# Patient Record
Sex: FEMALE | Race: WHITE | Employment: UNEMPLOYED | URBAN - METROPOLITAN AREA
[De-identification: names, ages, dates, MRNs, and addresses within clinical notes are randomized per-mention and may not be internally consistent; named-entity substitution may affect disease eponyms.]

---

## 2017-01-24 RX ORDER — ZOLPIDEM TARTRATE 5 MG/1
TABLET ORAL
Qty: 30 TABLET | Refills: 0 | OUTPATIENT
Start: 2017-01-24 | End: 2017-01-24

## 2017-01-24 RX ORDER — ZOLPIDEM TARTRATE 5 MG/1
TABLET ORAL
Qty: 30 TABLET | Refills: 0 | OUTPATIENT
Start: 2017-01-24 | End: 2017-02-28

## 2017-03-01 RX ORDER — FOSINOPRIL SODIUM AND HYDROCHLOROTHIAZIDE 20; 12.5 MG/1; MG/1
TABLET ORAL
Qty: 90 TABLET | Refills: 1 | Status: SHIPPED | OUTPATIENT
Start: 2017-03-01 | End: 2017-10-11

## 2017-03-01 RX ORDER — ZOLPIDEM TARTRATE 5 MG/1
TABLET ORAL
Qty: 30 TABLET | Refills: 0 | OUTPATIENT
Start: 2017-03-01 | End: 2017-04-10

## 2017-04-10 RX ORDER — ZOLPIDEM TARTRATE 5 MG/1
TABLET ORAL
Qty: 30 TABLET | Refills: 0 | OUTPATIENT
Start: 2017-04-10 | End: 2017-05-12

## 2017-04-12 RX ORDER — ZOLPIDEM TARTRATE 5 MG/1
TABLET ORAL
Qty: 30 TABLET | Refills: 0 | OUTPATIENT
Start: 2017-04-12

## 2017-04-12 NOTE — TELEPHONE ENCOUNTER
Pharmacy      [id number], [facility] [phone], [phone]       Medication Detail         Disp Refills Start End        ZOLPIDEM TARTRATE 5 MG Oral Tab 30 tablet 0 4/10/2017       Sig: TAKE 1 TABLET BY MOUTH AT BEDTIME

## 2017-05-10 NOTE — TELEPHONE ENCOUNTER
Malou/THELMA called for a new refill request  Patient is currently out of medication  Current Outpatient Prescriptions:  ZOLPIDEM TARTRATE 5 MG Oral Tab TAKE 1 TABLET BY MOUTH AT BEDTIME Disp: 30 tablet Rfl: 0     [facility] Carissa-balaji

## 2017-05-13 RX ORDER — ZOLPIDEM TARTRATE 5 MG/1
TABLET ORAL
Qty: 30 TABLET | Refills: 0 | OUTPATIENT
Start: 2017-05-13 | End: 2017-06-07

## 2017-06-01 ENCOUNTER — TELEPHONE (OUTPATIENT)
Dept: INTERNAL MEDICINE CLINIC | Facility: CLINIC | Age: 62
End: 2017-06-01

## 2017-06-01 NOTE — TELEPHONE ENCOUNTER
Spouse stts the pt need to be seen only with PCP   Per PCP she can book her in for an appt   Spouse is asking for Monday   Pt is having stomach pain   Please advise

## 2017-06-02 NOTE — TELEPHONE ENCOUNTER
Called and s/w , he stts pt is at work,   Advised pt's  that we need to speak with pt to triage,  stated pt needs an appt, he stts he mentioned to MMP last week that pt needs to be seen and he states MMP advised him to call the office.

## 2017-06-02 NOTE — TELEPHONE ENCOUNTER
Actions Requested: Dr. Aga Parry, please advise if patient can be accommodated 8:20am on [facility] 6/7/17 [facility] office.    Situation/Background   Problem: stomach discomfort   Onset: 1-2 weeks   Associated Symptoms: slight burniing, gassy, bloated, no nausea, bandar

## 2017-06-03 NOTE — TELEPHONE ENCOUNTER
LMTCB- transfer to triage. Left detailed message on voicemail with Dr Kimi Romo note and to call office back to confirm appointment.

## 2017-06-03 NOTE — TELEPHONE ENCOUNTER
Pt called back. Pt agreed to Wed at 8:20. She knows to be here at [address], please book this appt.

## 2017-06-07 NOTE — PROGRESS NOTES
HPI:    Patient ID: Martin Sapp is a 62year old female.     HPI  Epigastric abodminl pain for about 2 wks  Had it in the past is worseing  Off antaci  Mild nausea no vomiting  Wt Readings from Last 6 Encounters:  06/07/17 : 143 lb 3.2 oz (64.955 kg) advised to stop her simvastatin (she has been taking this for 2-1/2 years) and Aleve which she has been taking for spinal stenosis.  A right upper quadrant ultrasound revealed increased hepatic echogenicity and a prominent echogenic structure in the neck of Gastrointestinal: Negative for nausea, vomiting, abdominal pain, diarrhea, constipation and blood in stool. Genitourinary: Negative for dysuria, urgency, frequency, hematuria and difficulty urinating.    Musculoskeletal: Negative for back pain, joint sw Mother    • Saranya Jj Maternal Grandmother    • Other[other] [OTHER] Maternal Grandfather    • Saranya Jj Paternal Grandmother    • Other[other] [OTHER] Paternal Grandfather       Social History:   Smoking Status: Former Smoker mg/dL 0.80    BUN/CREA RATIO      10.0 - 20.0 22.5 (H)    ANION GAP      0 - 18 8    CALCIUM      8.5 - 10.5 mg/dL 9.6    CALCULATED OSMOLALITY      275 - 295 mOsm/kg 302 (H)    AST (SGOT)      15 - 41 U/L 19    ALT (SGPT)      14 - 54 U/L 20    ALKALINE P WBC      0 - 5 /HPF 4    URINE RBC      0 - 3 /HPF 2    URINE BACTERIA      None seen Negative    MICROSCOPIC URINALYSIS COMMENT       Completed    HDL Cholesterol       63    CHOLESTEROL, TOTAL      110 - 200 mg/dL 239 (H)    Triglycerides      1 - 149 mg sofya sheikh exam advised    (Z78.0) Postmenopausal  Plan: XR DEXA BONE DENSITOMETRY (CPT=77080)          . Medication reviewed. Refill will be given as needed . Mediation side effect reviewed. Most recent laboratory and diagnostic testing reviewed.   Diet

## 2017-06-15 ENCOUNTER — TELEPHONE (OUTPATIENT)
Dept: INTERNAL MEDICINE CLINIC | Facility: CLINIC | Age: 62
End: 2017-06-15

## 2017-06-15 NOTE — TELEPHONE ENCOUNTER
Pt stts per her last visit she only got 2 meds that was sent to her Pharm   Pt stts according to her office notes 3 RX meds were to be prescribed   Pt did not get the RX escitalopram (LEXAPRO) 5 MG Oral Tab    Please advise

## 2017-06-16 RX ORDER — ESCITALOPRAM OXALATE 5 MG/1
5 TABLET ORAL DAILY
Qty: 30 TABLET | Refills: 0 | Status: SHIPPED | OUTPATIENT
Start: 2017-06-16 | End: 2017-07-24

## 2017-07-08 RX ORDER — ZOLPIDEM TARTRATE 5 MG/1
TABLET ORAL
Qty: 30 TABLET | Refills: 0 | OUTPATIENT
Start: 2017-07-08 | End: 2017-07-11

## 2017-07-08 RX ORDER — ALPRAZOLAM 0.25 MG/1
TABLET ORAL
Qty: 60 TABLET | Refills: 0 | OUTPATIENT
Start: 2017-07-08 | End: 2017-07-11

## 2017-07-11 ENCOUNTER — TELEPHONE (OUTPATIENT)
Dept: INTERNAL MEDICINE CLINIC | Facility: CLINIC | Age: 62
End: 2017-07-11

## 2017-07-11 RX ORDER — ALPRAZOLAM 0.25 MG/1
TABLET ORAL
Qty: 60 TABLET | Refills: 0 | OUTPATIENT
Start: 2017-07-11 | End: 2017-09-13

## 2017-07-12 RX ORDER — ZOLPIDEM TARTRATE 5 MG/1
TABLET ORAL
Qty: 30 TABLET | Refills: 0 | OUTPATIENT
Start: 2017-07-12 | End: 2017-08-18

## 2017-07-13 NOTE — TELEPHONE ENCOUNTER
THELMA - Arnold Pillion calling checking status of refill for two meds, pt is out of meds.        Current Outpatient Prescriptions:  ZOLPIDEM TARTRATE 5 MG Oral Tab TAKE 1 TABLET BY MOUTH AT BEDTIME Disp: 30 tablet Rfl: 0   ALPRAZOLAM 0.25 MG Oral Tab TAKE 1 TABLET BY M

## 2017-07-25 RX ORDER — ESCITALOPRAM OXALATE 5 MG/1
5 TABLET ORAL DAILY
Qty: 30 TABLET | Refills: 5 | Status: SHIPPED | OUTPATIENT
Start: 2017-07-25 | End: 2017-10-11

## 2017-07-28 ENCOUNTER — TELEPHONE (OUTPATIENT)
Dept: INTERNAL MEDICINE CLINIC | Facility: CLINIC | Age: 62
End: 2017-07-28

## 2017-07-28 NOTE — TELEPHONE ENCOUNTER
Pharmacy reports that they have faxed request to our office for refill, but no response received. Current Outpatient Prescriptions:  Omeprazole 40 MG Oral Capsule Delayed Release Take 1 capsule (40 mg total) by mouth daily.  Disp: 30 capsule Rfl: 5     P

## 2017-08-03 RX ORDER — OMEPRAZOLE 40 MG/1
40 CAPSULE, DELAYED RELEASE ORAL DAILY
Qty: 90 CAPSULE | Refills: 1 | Status: SHIPPED | OUTPATIENT
Start: 2017-08-03 | End: 2019-02-25 | Stop reason: ALTCHOICE

## 2017-08-17 NOTE — TELEPHONE ENCOUNTER
Per César Radford  of pt,  Pt needs a refill on ZOLPIDEM TARTRATE . Pt is out of med.       Current Outpatient Prescriptions:              ZOLPIDEM TARTRATE 5 MG Oral Tab TAKE 1 TABLET BY MOUTH AT BEDTIME Disp: 30 tablet Rfl: 0

## 2017-08-18 NOTE — TELEPHONE ENCOUNTER
Requesting Zolpidem refill    Last filled 7/12/17  Last OV 6/7/17    Med pended for review, please advise

## 2017-08-21 RX ORDER — ZOLPIDEM TARTRATE 5 MG/1
TABLET ORAL
Qty: 30 TABLET | Refills: 0 | OUTPATIENT
Start: 2017-08-21 | End: 2017-08-22

## 2017-08-22 RX ORDER — ZOLPIDEM TARTRATE 5 MG/1
TABLET ORAL
Qty: 30 TABLET | Refills: 0 | OUTPATIENT
Start: 2017-08-22 | End: 2017-10-03

## 2017-08-24 ENCOUNTER — NURSE TRIAGE (OUTPATIENT)
Dept: OTHER | Age: 62
End: 2017-08-24

## 2017-08-24 NOTE — TELEPHONE ENCOUNTER
Called  to confirm pharmacy is now [facility].  started asking me about getting an earlier appt w/ MA for himself, I advised he call back to speak with scheduling as I don't regularly schedule for her. Rx called into [facility] .

## 2017-08-24 NOTE — TELEPHONE ENCOUNTER
Action Requested: Summary for Provider     []  Critical Lab, Recommendations Needed  [] Need Additional Advice  []   FYI    []   Need Orders  [] Need Medications Sent to Pharmacy  []  Other     SUMMARY:    All information in this encounter is from her husb

## 2017-08-28 NOTE — TELEPHONE ENCOUNTER
I called pt to see how she was doing [place] pt  is very upset. He stated that he needs you to see his wife this week and he will also like to see you sooner then 9/1.  He stated that he has been seeing you for 30 years and he even moved lon

## 2017-08-30 NOTE — TELEPHONE ENCOUNTER
Spoke with patient's  and scheduled appt for patient for Friday 9/1/17 at 1:20pm with Dr. Jovita Patiño at [facility]. Appt also scheduled for patient's  on same day at 1:40pm immediately after patient's appt.       MD Ross Gray

## 2017-09-13 ENCOUNTER — HOSPITAL ENCOUNTER (OUTPATIENT)
Dept: MAMMOGRAPHY | Facility: HOSPITAL | Age: 62
Discharge: HOME | End: 2017-09-13
Attending: INTERNAL MEDICINE
Payer: COMMERCIAL

## 2017-09-13 ENCOUNTER — HOSPITAL ENCOUNTER (OUTPATIENT)
Dept: BONE DENSITY | Facility: HOSPITAL | Age: 62
Discharge: HOME | End: 2017-09-13
Attending: INTERNAL MEDICINE
Payer: COMMERCIAL

## 2017-09-13 DIAGNOSIS — Z78.0 POSTMENOPAUSAL: ICD-10-CM

## 2017-09-13 DIAGNOSIS — R92.2 DENSE BREAST: ICD-10-CM

## 2017-09-13 PROCEDURE — 77080 DXA BONE DENSITY AXIAL: CPT | Performed by: INTERNAL MEDICINE

## 2017-09-13 PROCEDURE — 77066 DX MAMMO INCL CAD BI: CPT | Performed by: INTERNAL MEDICINE

## 2017-09-13 PROCEDURE — 77062 BREAST TOMOSYNTHESIS BI: CPT | Performed by: INTERNAL MEDICINE

## 2017-09-13 NOTE — TELEPHONE ENCOUNTER
Spouse states that pt would like a refill on alprazolam medication. Per spouse pt is out of medication. [facility] per spouse pt is out of medication.          Current Outpatient Prescriptions:  ALPRAZOLAM 0.25 MG Oral Tab TAKE 1 TABLET

## 2017-09-14 ENCOUNTER — HOSPITAL ENCOUNTER (OUTPATIENT)
Dept: ULTRASOUND IMAGING | Age: 62
Discharge: HOME | End: 2017-09-14
Attending: INTERNAL MEDICINE
Payer: COMMERCIAL

## 2017-09-14 DIAGNOSIS — K76.0 FATTY LIVER: ICD-10-CM

## 2017-09-14 DIAGNOSIS — R10.13 EPIGASTRIC PAIN: ICD-10-CM

## 2017-09-14 PROCEDURE — 76705 ECHO EXAM OF ABDOMEN: CPT | Performed by: INTERNAL MEDICINE

## 2017-09-14 RX ORDER — ALPRAZOLAM 0.25 MG/1
TABLET ORAL
Qty: 60 TABLET | Refills: 0 | OUTPATIENT
Start: 2017-09-14 | End: 2017-10-11

## 2017-09-15 ENCOUNTER — TELEPHONE (OUTPATIENT)
Dept: OTHER | Age: 62
End: 2017-09-15

## 2017-09-15 NOTE — TELEPHONE ENCOUNTER
Notes Recorded by Saravanan Maldonado MD on 9/14/2017 at 2:12 AM CDT  Impression   CONCLUSION:   * Severe osteopenia left femoral neck. Normal bone density total left hip and lumbar spine. * No significant change since previous study.   * 9.5% 10 year risk fo

## 2017-09-15 NOTE — TELEPHONE ENCOUNTER
[facility]    Please transfer [id number] anytime. Thank you. Result Notes     Notes Recorded by Checo Cervantes MD on 9/14/2017 at 2:12 AM CDT  Impression   CONCLUSION:   * Severe osteopenia left femoral neck.  Normal bone density total left hip and lumbar spine

## 2017-10-09 NOTE — TELEPHONE ENCOUNTER
Per pt, she needs refill on her FOSINOPRIL SODIUM-HCTZ and ESCITALOPRAM and pt is out of med. Current Outpatient Prescriptions:                    ESCITALOPRAM 5 MG Oral Tab TAKE 1 TABLET (5 MG TOTAL) BY MOUTH DAILY.  Disp: 30 tablet Rfl: 5   FOSINOPRI

## 2017-10-09 NOTE — TELEPHONE ENCOUNTER
Spoke with patient and relayed MMP message below--patient request advice re: osteopenia--she states she is not taking any calcium supplements. Do you recommend any OTC or prescription medications for osteopenia? Please advise.

## 2017-10-10 NOTE — TELEPHONE ENCOUNTER
Pt's spouse calling to check status of refill and needing to add 1 more med below and states out of medication.     Current Outpatient Prescriptions:   •  ALPRAZolam 0.25 MG Oral Tab, TAKE 1 TABLET BY MOUTH TWICE A DAY AS NEEDED FOR SLEEP, Disp: 60 tablet,

## 2017-10-11 NOTE — TELEPHONE ENCOUNTER
Refill Protocol Appointment Criteria  · Appointment scheduled in the past 6 months or in the next 3 months  Recent Outpatient Visits            4 months ago Ananda Croft MD    Office Visit    1 makenzie

## 2017-10-11 NOTE — TELEPHONE ENCOUNTER
She blood work order generated June 2017  She should have labs done Vit D included  Then I can tell her how much vit Dsupplement she will need

## 2017-10-12 RX ORDER — ESCITALOPRAM OXALATE 5 MG/1
5 TABLET ORAL DAILY
Qty: 30 TABLET | Refills: 5 | Status: SHIPPED | OUTPATIENT
Start: 2017-10-12 | End: 2018-02-12

## 2017-10-12 RX ORDER — ALPRAZOLAM 0.25 MG/1
TABLET ORAL
Qty: 60 TABLET | Refills: 0 | OUTPATIENT
Start: 2017-10-12 | End: 2017-11-07

## 2017-10-12 RX ORDER — FOSINOPRIL SODIUM AND HYDROCHLOROTHIAZIDE 20; 12.5 MG/1; MG/1
1 TABLET ORAL
Qty: 90 TABLET | Refills: 1 | Status: SHIPPED | OUTPATIENT
Start: 2017-10-12 | End: 2018-04-12

## 2017-10-13 NOTE — TELEPHONE ENCOUNTER
Rx phoned into the pharmacy VM as below;    ALPRAZolam 0.25 MG Oral Tab 60 tablet 0 10/12/2017    Sig : Quinton Dacosta 1 TABLET BY MOUTH TWICE A DAY AS NEEDED FOR SLEEP     Route:   (none)     Comment:   Not to exceed 5 additional fills before 12/04/2017     Class:

## 2017-11-07 RX ORDER — ALPRAZOLAM 0.25 MG/1
TABLET ORAL
Qty: 60 TABLET | Refills: 0 | Status: CANCELLED | OUTPATIENT
Start: 2017-11-07

## 2017-11-07 NOTE — TELEPHONE ENCOUNTER
Chart reviewed, \  ALPRAZOLAM LF 10/12/17 NEVER CALLED INTO PHARMACY APPROVED 10/12/17 1656 NOT PRINTED   ZOLPIDEM LF 10/3/17

## 2017-11-07 NOTE — TELEPHONE ENCOUNTER
Dr. Nicole Miner, please see pended medication and advise.      LOV: 6/7/17  Last prescribed:    Zolpidem - 10/3/17 for 30 tabs/0 refills   Alprazolam - 10/12/17 for 60 tabs/0 refills

## 2017-11-07 NOTE — TELEPHONE ENCOUNTER
Spouse is requesting refill for pt.      ALPRAZolam 0.25 MG Oral Tab TAKE 1 TABLET BY MOUTH TWICE A DAY AS NEEDED FOR SLEEP Disp: 60 tablet Rfl: 0   Zolpidem Tartrate 5 MG Oral Tab TAKE 1 TABLET BY MOUTH AT BEDTIME Disp: 30 tablet Rfl: 0

## 2017-11-09 NOTE — TELEPHONE ENCOUNTER
Dr. Hina Storey please advise family calling checking on when medication will be called into the pharmacy.

## 2017-11-10 RX ORDER — ZOLPIDEM TARTRATE 5 MG/1
TABLET ORAL
Qty: 30 TABLET | Refills: 0 | OUTPATIENT
Start: 2017-11-10

## 2017-11-10 RX ORDER — ALPRAZOLAM 0.5 MG/1
TABLET ORAL
Qty: 60 TABLET | Refills: 0 | OUTPATIENT
Start: 2017-11-10

## 2017-11-10 RX ORDER — ALPRAZOLAM 0.25 MG/1
TABLET ORAL
Qty: 60 TABLET | Refills: 0 | OUTPATIENT
Start: 2017-11-10 | End: 2017-12-13

## 2017-11-10 RX ORDER — ZOLPIDEM TARTRATE 5 MG/1
TABLET ORAL
Qty: 30 TABLET | Refills: 0 | OUTPATIENT
Start: 2017-11-10 | End: 2018-01-16

## 2017-11-10 NOTE — TELEPHONE ENCOUNTER
Please advise on refill request.    Recent Outpatient Visits            5 months ago Anxiety state    Chelita Rolon MD    Office Visit    1 year ago Traveler's diarrhea    [facility]], [facility], [address] [place] [address],

## 2017-11-13 RX ORDER — ALPRAZOLAM 0.5 MG/1
TABLET ORAL
Qty: 60 TABLET | Refills: 0 | OUTPATIENT
Start: 2017-11-13

## 2017-11-13 NOTE — TELEPHONE ENCOUNTER
Pharmacy   [facility] [address] [facility] [address], [address] [place] [address], [phone], [phone]   Medication Detail    Disp Refills Start End    ALPRAZolam 0.25 MG Oral Tab 60 tablet 0 11/10/2017     Sig: TAKE 1 TABLET BY MOUTH TWIC

## 2017-11-15 NOTE — TELEPHONE ENCOUNTER
Pharmacy   [facility]/PHARMACY [address] [facility] [address] Garza, [address] [place] [address], [phone], [phone]   Medication Detail    Disp Refills Start End    Zolpidem Tartrate 5 MG Oral Tab 30 tablet 0 11/10/2017     Sig: TAKE 1 TABLET BY MOUTH

## 2017-11-20 ENCOUNTER — APPOINTMENT (OUTPATIENT)
Dept: LAB | Facility: HOSPITAL | Age: 62
End: 2017-11-20
Attending: INTERNAL MEDICINE
Payer: MEDICAID

## 2017-11-20 DIAGNOSIS — F41.1 ANXIETY STATE: ICD-10-CM

## 2017-11-20 DIAGNOSIS — E55.9 VITAMIN D DEFICIENCY: ICD-10-CM

## 2017-11-20 DIAGNOSIS — R73.9 HYPERGLYCEMIA: ICD-10-CM

## 2017-11-20 DIAGNOSIS — I10 ESSENTIAL HYPERTENSION: ICD-10-CM

## 2017-11-20 DIAGNOSIS — E78.5 HYPERLIPIDEMIA, UNSPECIFIED HYPERLIPIDEMIA TYPE: ICD-10-CM

## 2017-11-20 PROCEDURE — 82306 VITAMIN D 25 HYDROXY: CPT

## 2017-11-20 PROCEDURE — 82746 ASSAY OF FOLIC ACID SERUM: CPT

## 2017-11-20 PROCEDURE — 93005 ELECTROCARDIOGRAM TRACING: CPT

## 2017-11-20 PROCEDURE — 80061 LIPID PANEL: CPT

## 2017-11-20 PROCEDURE — 84439 ASSAY OF FREE THYROXINE: CPT

## 2017-11-20 PROCEDURE — 83036 HEMOGLOBIN GLYCOSYLATED A1C: CPT

## 2017-11-20 PROCEDURE — 93010 ELECTROCARDIOGRAM REPORT: CPT | Performed by: INTERNAL MEDICINE

## 2017-11-20 PROCEDURE — 84443 ASSAY THYROID STIM HORMONE: CPT

## 2017-11-20 PROCEDURE — 82607 VITAMIN B-12: CPT

## 2017-11-20 PROCEDURE — 85027 COMPLETE CBC AUTOMATED: CPT

## 2017-11-20 PROCEDURE — 80053 COMPREHEN METABOLIC PANEL: CPT

## 2017-11-20 PROCEDURE — 81001 URINALYSIS AUTO W/SCOPE: CPT

## 2017-11-20 PROCEDURE — 36415 COLL VENOUS BLD VENIPUNCTURE: CPT

## 2017-12-13 RX ORDER — ALPRAZOLAM 0.25 MG/1
TABLET ORAL
Qty: 60 TABLET | Refills: 0 | OUTPATIENT
Start: 2017-12-13 | End: 2017-12-14

## 2017-12-13 NOTE — TELEPHONE ENCOUNTER
LOV: 6/7/17  Last Rx: 11/10/17    Please advise in regards to refill request. Thank You    Please respond to pool: ZAIN [facility] LPN/WILBERT

## 2017-12-14 RX ORDER — ALPRAZOLAM 0.25 MG/1
TABLET ORAL
Qty: 60 TABLET | Refills: 0 | OUTPATIENT
Start: 2017-12-14 | End: 2017-12-22

## 2017-12-20 NOTE — TELEPHONE ENCOUNTER
Pharmacy calling and states did not receive refill. Approved Alprazolam refilled as directed below.                               Medication Detail      Disp Refills Start End    ALPRAZOLAM 0.25 MG Oral Tab 60 tablet 0 12/14/2017     Sig: TAKE 1 TABLET BY

## 2017-12-22 ENCOUNTER — OFFICE VISIT (OUTPATIENT)
Dept: INTERNAL MEDICINE CLINIC | Facility: CLINIC | Age: 62
End: 2017-12-22

## 2017-12-22 VITALS
HEIGHT: 61.5 IN | WEIGHT: 141 LBS | TEMPERATURE: 98 F | DIASTOLIC BLOOD PRESSURE: 78 MMHG | BODY MASS INDEX: 26.28 KG/M2 | HEART RATE: 61 BPM | SYSTOLIC BLOOD PRESSURE: 144 MMHG

## 2017-12-22 DIAGNOSIS — E78.5 HYPERLIPIDEMIA, UNSPECIFIED HYPERLIPIDEMIA TYPE: ICD-10-CM

## 2017-12-22 DIAGNOSIS — E55.9 VITAMIN D DEFICIENCY: ICD-10-CM

## 2017-12-22 DIAGNOSIS — I10 ESSENTIAL HYPERTENSION: Primary | ICD-10-CM

## 2017-12-22 DIAGNOSIS — F41.1 ANXIETY STATE: ICD-10-CM

## 2017-12-22 PROCEDURE — 96372 THER/PROPH/DIAG INJ SC/IM: CPT | Performed by: INTERNAL MEDICINE

## 2017-12-22 PROCEDURE — 99214 OFFICE O/P EST MOD 30 MIN: CPT | Performed by: INTERNAL MEDICINE

## 2017-12-22 PROCEDURE — 90471 IMMUNIZATION ADMIN: CPT | Performed by: INTERNAL MEDICINE

## 2017-12-22 PROCEDURE — 90686 IIV4 VACC NO PRSV 0.5 ML IM: CPT | Performed by: INTERNAL MEDICINE

## 2017-12-22 RX ORDER — CYANOCOBALAMIN 1000 UG/ML
1000 INJECTION INTRAMUSCULAR; SUBCUTANEOUS ONCE
Status: COMPLETED | OUTPATIENT
Start: 2017-12-22 | End: 2017-12-22

## 2017-12-22 RX ORDER — ATORVASTATIN CALCIUM 20 MG/1
20 TABLET, FILM COATED ORAL NIGHTLY
Qty: 30 TABLET | Refills: 11 | Status: SHIPPED | OUTPATIENT
Start: 2017-12-22 | End: 2017-12-22

## 2017-12-22 RX ORDER — ERGOCALCIFEROL (VITAMIN D2) 1250 MCG
50000 CAPSULE ORAL WEEKLY
Qty: 12 CAPSULE | Refills: 1 | Status: SHIPPED | OUTPATIENT
Start: 2017-12-22 | End: 2018-02-21

## 2017-12-22 RX ORDER — ERGOCALCIFEROL (VITAMIN D2) 1250 MCG
50000 CAPSULE ORAL WEEKLY
Qty: 12 CAPSULE | Refills: 1 | Status: SHIPPED | OUTPATIENT
Start: 2017-12-22 | End: 2017-12-22

## 2017-12-22 RX ORDER — ATORVASTATIN CALCIUM 20 MG/1
20 TABLET, FILM COATED ORAL NIGHTLY
Qty: 30 TABLET | Refills: 11 | Status: SHIPPED | OUTPATIENT
Start: 2017-12-22 | End: 2018-01-20

## 2017-12-22 RX ORDER — ALPRAZOLAM 0.25 MG/1
TABLET ORAL
Qty: 60 TABLET | Refills: 0 | Status: SHIPPED | OUTPATIENT
Start: 2017-12-22 | End: 2018-02-03

## 2017-12-22 RX ADMIN — CYANOCOBALAMIN 1000 MCG: 1000 INJECTION INTRAMUSCULAR; SUBCUTANEOUS at 16:39:00

## 2017-12-29 NOTE — PROGRESS NOTES
HPI:    Patient ID: Martha White is a 65year old female.     HPI    HTN  Long standing history of hypertension     sympotms  :        Headache no  dizziness        no                             Blurred vision no  palpitaionsSyncope no  Chest pain  no Current Outpatient Prescriptions:  ALPRAZolam 0.25 MG Oral Tab TAKE 1 TABLET BY MOUTH TWICE A DAY AS NEEDED FOR SLEEP Disp: 60 tablet Rfl: 0   ergocalciferol 12307 units Oral Cap Take 1 capsule (50,000 Units total) by mouth once a week.  Disp: 12 capsul Comment: Weely; 1 glass of wine       PHYSICAL EXAM:    Physical Exam   Constitutional: She appears well-nourished. No distress. HENT:   Head: Normocephalic and atraumatic.    Right Ear: External ear normal.   Left Ear: External ear normal.   No recent laboratory and diagnostic testing reviewed. Dietary and lifestyle change discussed. Modification of risk for CAD discussed. Patient voiced understanding and agrees with current plan and management.           Orders Placed This Encounter      Vit D

## 2018-01-04 ENCOUNTER — TELEPHONE (OUTPATIENT)
Dept: OTHER | Age: 63
End: 2018-01-04

## 2018-01-16 RX ORDER — ZOLPIDEM TARTRATE 5 MG/1
TABLET ORAL
Qty: 30 TABLET | Refills: 0 | OUTPATIENT
Start: 2018-01-16 | End: 2018-03-19

## 2018-01-16 NOTE — TELEPHONE ENCOUNTER
Spouse states that pt would like a refill on Fosinopril, zolpidem and omeprazole medication. Pt is out of medication.  Pharmacy: [facility] (listed)       Current Outpatient Prescriptions:  Zolpidem Tartrate 5 MG Oral Tab TAKE 1 TABLET BY MOUTH AT B

## 2018-01-17 NOTE — TELEPHONE ENCOUNTER
Advised patient that fosinopril/hctz can be transferred to [place] from [facility]. Patient verbalized understanding and will call pharmacy to transfer script. Does not need omeprazole. Just needs zolpidem. Please advise.      Recent Outpatient Visits

## 2018-01-20 NOTE — TELEPHONE ENCOUNTER
Please advise on refill request.    Cholesterol Medications  Protocol Criteria:  · Appointment scheduled in the past 12 months or in the next 3 months  · ALT & LDL on file in the past 12 months  · ALT result < 80  · LDL result <130   Recent Outpatient Visi

## 2018-01-20 NOTE — TELEPHONE ENCOUNTER
Pharmacy     [address] [facility], [address] [place] [address] [address] [place] [address], [phone], [phone]   Medication Detail      Disp Refills Start End    Zolpidem Tartrate 5 MG Oral Tab 30 tablet 0 1/16/2018     Sig: TAKE 1

## 2018-01-21 RX ORDER — ATORVASTATIN CALCIUM 20 MG/1
20 TABLET, FILM COATED ORAL NIGHTLY
Qty: 30 TABLET | Refills: 11 | Status: SHIPPED | OUTPATIENT
Start: 2018-01-21 | End: 2018-12-22

## 2018-01-22 NOTE — TELEPHONE ENCOUNTER
Order History   Outpatient   Date/Time Action Taken User Additional Information   01/20/18 363 Abbi Laughlin [place]    01/21/18 0206 Sign Mynor Solis MD Reorder from Order: [id number]   01/21/18 0206 Taking Evi Garcia MD    Pr

## 2018-02-01 ENCOUNTER — TELEPHONE (OUTPATIENT)
Dept: INTERNAL MEDICINE CLINIC | Facility: CLINIC | Age: 63
End: 2018-02-01

## 2018-02-01 NOTE — TELEPHONE ENCOUNTER
Pt is requesting a refill for med below going to new pharmacy [facility] [place] on [address] on file\ and states out of meds.      •  ALPRAZolam 0.25 MG Oral Tab, TAKE 1 TABLET BY MOUTH TWICE A DAY AS NEEDED FOR SLEEP, Disp: 60 tablet, Rfl: 0

## 2018-02-03 NOTE — TELEPHONE ENCOUNTER
LOV: 12/22/17  Last Rx: 12/22/17    Please advise in regards to refill request. Thank You    Please respond to pool: ZAIN [facility] LPN/CMA

## 2018-02-04 RX ORDER — ALPRAZOLAM 0.25 MG/1
TABLET ORAL
Qty: 60 TABLET | Refills: 0 | OUTPATIENT
Start: 2018-02-04 | End: 2018-03-16

## 2018-02-12 NOTE — TELEPHONE ENCOUNTER
Pt requesting refill for Current Outpatient Prescriptions:  escitalopram 5 MG Oral Tab Take 1 tablet (5 mg total) by mouth daily.  Disp: 30 tablet Rfl: 5   Please advise, thank you

## 2018-02-12 NOTE — TELEPHONE ENCOUNTER
Please advise on refill request      Refill Protocol Appointment Criteria  · Appointment scheduled in the past 6 months or in the next 3 months  Recent Outpatient Visits            1 month ago Essential hypertension    [facility], [facility], [address] [facility]

## 2018-02-12 NOTE — TELEPHONE ENCOUNTER
Pharmacy   [facility], [address] [place] [address] [address] [place] [address], [phone], [phone]   Medication Detail    Disp Refills Start End    ALPRAZolam 0.25 MG Oral Tab 60 tablet 0 2/4/2018     Sig: TAKE 1 TABLET B

## 2018-02-13 RX ORDER — ESCITALOPRAM OXALATE 5 MG/1
5 TABLET ORAL DAILY
Qty: 90 TABLET | Refills: 1 | Status: SHIPPED | OUTPATIENT
Start: 2018-02-13 | End: 2018-11-30

## 2018-02-22 RX ORDER — ERGOCALCIFEROL 1.25 MG/1
50000 CAPSULE ORAL WEEKLY
Qty: 12 CAPSULE | Refills: 1 | Status: SHIPPED | OUTPATIENT
Start: 2018-02-22 | End: 2018-05-23

## 2018-03-19 NOTE — TELEPHONE ENCOUNTER
Please advise on refill request.    Please respond to pool: EM [facility] LPN/CMA    Refill Protocol Appointment Criteria  · Appointment scheduled in the past 6 months or in the next 3 months  Recent Outpatient Visits            2 months ago Essential hyperten

## 2018-03-20 NOTE — TELEPHONE ENCOUNTER
Please advise on refill request. For Dr April Cruz    Please respond to pool: EM [facility] LPN/CMA    Refill Protocol Appointment Criteria  · Appointment scheduled in the past 6 months or in the next 3 months  Recent Outpatient Visits            2 months ago Ess

## 2018-03-24 RX ORDER — ZOLPIDEM TARTRATE 5 MG/1
TABLET ORAL
Qty: 30 TABLET | Refills: 0 | OUTPATIENT
Start: 2018-03-24 | End: 2018-04-26

## 2018-03-27 RX ORDER — ALPRAZOLAM 0.25 MG/1
TABLET ORAL
Qty: 60 TABLET | Refills: 0 | OUTPATIENT
Start: 2018-03-27 | End: 2018-04-26

## 2018-03-28 NOTE — TELEPHONE ENCOUNTER
Class: Phone in Start: 3/27/2018   Approved by: Simeon Chicas MD    To Site staff pls f/u with pharm, thanks.        Signed Prescriptions Disp Refills    ALPRAZOLAM 0.25 MG Oral Tab 60 tablet 0      Sig: TAKE 1 TABLET BY MOUTH TWICE DAILY AS NEEDED FOR S

## 2018-03-28 NOTE — TELEPHONE ENCOUNTER
Received call from  Leanna at [facility] stating they have not received the medication call in. Rx called in w/ Leanna.

## 2018-03-30 ENCOUNTER — HOSPITAL ENCOUNTER (OUTPATIENT)
Dept: MAMMOGRAPHY | Facility: HOSPITAL | Age: 63
Discharge: HOME | End: 2018-03-30
Attending: INTERNAL MEDICINE
Payer: COMMERCIAL

## 2018-03-30 DIAGNOSIS — N60.11 FIBROCYSTIC BREAST, RIGHT: ICD-10-CM

## 2018-03-30 PROCEDURE — 77065 DX MAMMO INCL CAD UNI: CPT | Performed by: INTERNAL MEDICINE

## 2018-04-13 RX ORDER — FOSINOPRIL SODIUM AND HYDROCHLOROTHIAZIDE 20; 12.5 MG/1; MG/1
1 TABLET ORAL
Qty: 90 TABLET | Refills: 0 | Status: SHIPPED | OUTPATIENT
Start: 2018-04-13 | End: 2018-07-14

## 2018-04-26 NOTE — TELEPHONE ENCOUNTER
please advise as does not meet RN protocol for refill criteria   Please respond to pool: EM [facility] [address] LPN/CMA      Refill Protocol Appointment Criteria  · Appointment scheduled in the past 6 months or in the next 3 months  Recent Outpatient Visits

## 2018-04-27 RX ORDER — ZOLPIDEM TARTRATE 5 MG/1
TABLET ORAL
Qty: 30 TABLET | Refills: 0 | OUTPATIENT
Start: 2018-04-27 | End: 2018-05-30

## 2018-04-27 RX ORDER — ALPRAZOLAM 0.25 MG/1
TABLET ORAL
Qty: 60 TABLET | Refills: 0 | OUTPATIENT
Start: 2018-04-27 | End: 2018-05-24

## 2018-05-24 RX ORDER — ALPRAZOLAM 0.25 MG/1
TABLET ORAL
Qty: 60 TABLET | Refills: 0 | OUTPATIENT
Start: 2018-05-24 | End: 2018-06-21

## 2018-06-01 NOTE — TELEPHONE ENCOUNTER
please advise as does not meet RN protocol for refill criteria    LR 4/27/18  #30    Refill Protocol Appointment Criteria  · Appointment scheduled in the past 6 months or in the next 3 months  Recent Outpatient Visits            5 months ago Essential hyp

## 2018-06-03 RX ORDER — ZOLPIDEM TARTRATE 5 MG/1
TABLET ORAL
Qty: 30 TABLET | Refills: 0 | OUTPATIENT
Start: 2018-06-03 | End: 2018-07-05

## 2018-06-21 RX ORDER — ALPRAZOLAM 0.25 MG/1
TABLET ORAL
Qty: 60 TABLET | Refills: 0 | OUTPATIENT
Start: 2018-06-21 | End: 2018-07-14

## 2018-07-06 NOTE — TELEPHONE ENCOUNTER
LOV: 12/22/17, LR: 6/21/18, script pended. Please advise.     Please respond to pool: EM [facility] LPN/CMA

## 2018-07-07 RX ORDER — ZOLPIDEM TARTRATE 5 MG/1
TABLET ORAL
Qty: 30 TABLET | Refills: 0 | OUTPATIENT
Start: 2018-07-07 | End: 2018-08-08

## 2018-07-16 RX ORDER — FOSINOPRIL SODIUM AND HYDROCHLOROTHIAZIDE 20; 12.5 MG/1; MG/1
1 TABLET ORAL
Qty: 90 TABLET | Refills: 0 | Status: SHIPPED | OUTPATIENT
Start: 2018-07-16 | End: 2018-10-19

## 2018-07-16 RX ORDER — ALPRAZOLAM 0.25 MG/1
TABLET ORAL
Qty: 60 TABLET | Refills: 0 | OUTPATIENT
Start: 2018-07-16 | End: 2018-08-20

## 2018-08-08 RX ORDER — ZOLPIDEM TARTRATE 5 MG/1
TABLET ORAL
Qty: 30 TABLET | Refills: 0 | OUTPATIENT
Start: 2018-08-08 | End: 2018-09-06

## 2018-08-20 RX ORDER — ALPRAZOLAM 0.25 MG/1
TABLET ORAL
Qty: 60 TABLET | Refills: 0 | OUTPATIENT
Start: 2018-08-20 | End: 2018-09-19

## 2018-08-23 ENCOUNTER — NURSE TRIAGE (OUTPATIENT)
Dept: OTHER | Age: 63
End: 2018-08-23

## 2018-08-23 NOTE — TELEPHONE ENCOUNTER
Pt's spouse asking to schedule appt with Dr. Rose Salinas, states pt is experiencing pain in her legs. Spouse states pt needs a late appt, and is at work at this time.  Advised spouse to have pt call office as no MECHE in place, advised spouse office closes at 8 P

## 2018-08-30 NOTE — TELEPHONE ENCOUNTER
Please reply to pool: EM RN TRIAGE  Action Requested: Summary for Provider     []  Critical Lab, Recommendations Needed  [] Need Additional Advice  [x]   FYI    []   Need Orders  [] Need Medications Sent to Pharmacy  []  Other     SUMMARY: requesting jeannie

## 2018-08-31 NOTE — TELEPHONE ENCOUNTER
Tell her to stop atorvastatin until I see her    See if leg pain will get better withitn 1 week after stopping  Med  And start taking CoQ10 over the counter supplement

## 2018-09-07 NOTE — TELEPHONE ENCOUNTER
LOV: 12-22-17 Last Rx: 8-8-18    Please advise in regards to refill request. Thank You    Please respond to pool: ZAIN [facility] LPN/CMA

## 2018-09-09 RX ORDER — ZOLPIDEM TARTRATE 5 MG/1
TABLET ORAL
Qty: 30 TABLET | Refills: 0 | OUTPATIENT
Start: 2018-09-09 | End: 2018-10-09

## 2018-09-20 RX ORDER — ALPRAZOLAM 0.25 MG/1
TABLET ORAL
Qty: 60 TABLET | Refills: 0 | OUTPATIENT
Start: 2018-09-20 | End: 2018-10-18

## 2018-09-28 ENCOUNTER — OFFICE VISIT (OUTPATIENT)
Dept: INTERNAL MEDICINE CLINIC | Facility: CLINIC | Age: 63
End: 2018-09-28
Payer: COMMERCIAL

## 2018-09-28 DIAGNOSIS — F41.1 ANXIETY STATE: ICD-10-CM

## 2018-09-28 DIAGNOSIS — Z00.00 PHYSICAL EXAM: Primary | ICD-10-CM

## 2018-09-28 DIAGNOSIS — I10 ESSENTIAL HYPERTENSION: ICD-10-CM

## 2018-09-28 DIAGNOSIS — E55.9 VITAMIN D DEFICIENCY: ICD-10-CM

## 2018-09-28 DIAGNOSIS — E78.5 HYPERLIPIDEMIA, UNSPECIFIED HYPERLIPIDEMIA TYPE: ICD-10-CM

## 2018-09-28 PROCEDURE — 99396 PREV VISIT EST AGE 40-64: CPT | Performed by: INTERNAL MEDICINE

## 2018-09-28 PROCEDURE — 90686 IIV4 VACC NO PRSV 0.5 ML IM: CPT | Performed by: INTERNAL MEDICINE

## 2018-09-28 PROCEDURE — 90471 IMMUNIZATION ADMIN: CPT | Performed by: INTERNAL MEDICINE

## 2018-10-08 VITALS
HEIGHT: 61.5 IN | HEART RATE: 78 BPM | WEIGHT: 138 LBS | TEMPERATURE: 98 F | SYSTOLIC BLOOD PRESSURE: 146 MMHG | DIASTOLIC BLOOD PRESSURE: 80 MMHG | BODY MASS INDEX: 25.72 KG/M2

## 2018-10-08 NOTE — PROGRESS NOTES
HPI:    Patient ID: Sundeep Dumont is a 59year old female.     HPI    Physical exam    /80   Pulse 78   Temp 98.4 °F (36.9 °C) (Oral)   Ht 5' 1.5\" (1.562 m)   Wt 138 lb (62.6 kg)   BMI 25.65 kg/m²   Wt Readings from Last 6 Encounters:  09/28/18 : 20 MG Oral Tab Take 1 tablet (20 mg total) by mouth nightly. Disp: 30 tablet Rfl: 11   Omeprazole 40 MG Oral Capsule Delayed Release Take 1 capsule (40 mg total) by mouth daily.  Disp: 90 capsule Rfl: 1     Allergies:No Known Allergies    HISTORY:  Past Med normal, S2 normal, normal heart sounds and intact distal pulses. Exam reveals no gallop. No murmur heard. Pulmonary/Chest: Effort normal and breath sounds normal. No respiratory distress. She has no wheezes. She has no rales. She exhibits no tenderness. Thyroxine)      Hemoglobin A1C      Lipid Panel      Urine Microscopic w Reflex CULTURE      Flulaval 0.5 ml 6 mon and older Quad single dose PF (80036)      Meds This Visit:  Requested Prescriptions      No prescriptions requested or ordered in this encou

## 2018-10-11 NOTE — TELEPHONE ENCOUNTER
LOV: 9-28-18 Last Rx: 9-9-18    Please advise in regards to refill request. Thank You    Please respond to pool: ZAIN [address] LPN/CMA

## 2018-10-12 RX ORDER — ZOLPIDEM TARTRATE 5 MG/1
TABLET ORAL
Qty: 30 TABLET | Refills: 0 | OUTPATIENT
Start: 2018-10-12 | End: 2018-11-13

## 2018-10-16 ENCOUNTER — LAB ENCOUNTER (OUTPATIENT)
Dept: LAB | Facility: HOSPITAL | Age: 63
End: 2018-10-16
Attending: INTERNAL MEDICINE
Payer: COMMERCIAL

## 2018-10-16 DIAGNOSIS — Z00.00 PHYSICAL EXAM: ICD-10-CM

## 2018-10-16 DIAGNOSIS — E78.5 HYPERLIPIDEMIA, UNSPECIFIED HYPERLIPIDEMIA TYPE: ICD-10-CM

## 2018-10-16 DIAGNOSIS — E55.9 VITAMIN D DEFICIENCY: ICD-10-CM

## 2018-10-16 PROCEDURE — 82306 VITAMIN D 25 HYDROXY: CPT

## 2018-10-16 PROCEDURE — 36415 COLL VENOUS BLD VENIPUNCTURE: CPT

## 2018-10-16 PROCEDURE — 84443 ASSAY THYROID STIM HORMONE: CPT

## 2018-10-16 PROCEDURE — 80053 COMPREHEN METABOLIC PANEL: CPT

## 2018-10-16 PROCEDURE — 83036 HEMOGLOBIN GLYCOSYLATED A1C: CPT

## 2018-10-16 PROCEDURE — 85027 COMPLETE CBC AUTOMATED: CPT

## 2018-10-16 PROCEDURE — 81015 MICROSCOPIC EXAM OF URINE: CPT

## 2018-10-16 PROCEDURE — 80061 LIPID PANEL: CPT

## 2018-10-16 PROCEDURE — 87086 URINE CULTURE/COLONY COUNT: CPT

## 2018-10-16 PROCEDURE — 84439 ASSAY OF FREE THYROXINE: CPT

## 2018-10-18 RX ORDER — ALPRAZOLAM 0.25 MG/1
TABLET ORAL
Qty: 60 TABLET | Refills: 0 | OUTPATIENT
Start: 2018-10-18 | End: 2018-11-16

## 2018-10-19 RX ORDER — FOSINOPRIL SODIUM AND HYDROCHLOROTHIAZIDE 20; 12.5 MG/1; MG/1
1 TABLET ORAL
Qty: 90 TABLET | Refills: 0 | Status: SHIPPED | OUTPATIENT
Start: 2018-10-19 | End: 2019-01-21

## 2018-10-19 NOTE — TELEPHONE ENCOUNTER
Hypertensive Medications  Protocol Criteria:  · Appointment scheduled in the past 6 months or in the next 3 months  · BMP or CMP in the past 12 months  · Creatinine result < 2  Recent Outpatient Visits            3 weeks ago Physical exam    Blade Espinoza

## 2018-11-13 RX ORDER — ZOLPIDEM TARTRATE 5 MG/1
TABLET ORAL
Qty: 30 TABLET | Refills: 0 | OUTPATIENT
Start: 2018-11-13 | End: 2018-12-11

## 2018-11-17 RX ORDER — ALPRAZOLAM 0.25 MG/1
TABLET ORAL
Qty: 60 TABLET | Refills: 0 | OUTPATIENT
Start: 2018-11-17 | End: 2018-12-21

## 2018-11-30 RX ORDER — ESCITALOPRAM OXALATE 5 MG/1
TABLET ORAL
Qty: 90 TABLET | Refills: 0 | Status: SHIPPED | OUTPATIENT
Start: 2018-11-30 | End: 2019-12-05

## 2018-12-12 RX ORDER — ZOLPIDEM TARTRATE 5 MG/1
TABLET ORAL
Qty: 30 TABLET | Refills: 0 | OUTPATIENT
Start: 2018-12-12 | End: 2019-01-11

## 2018-12-21 RX ORDER — ALPRAZOLAM 0.25 MG/1
TABLET ORAL
Qty: 60 TABLET | Refills: 0 | OUTPATIENT
Start: 2018-12-21 | End: 2019-01-21

## 2018-12-22 RX ORDER — ATORVASTATIN CALCIUM 20 MG/1
TABLET, FILM COATED ORAL
Qty: 90 TABLET | Refills: 0 | Status: SHIPPED | OUTPATIENT
Start: 2018-12-22 | End: 2020-03-29

## 2018-12-23 NOTE — TELEPHONE ENCOUNTER
Cholesterol Medications  Protocol Criteria:  · Appointment scheduled in the past 12 months or in the next 3 months  · ALT & LDL on file in the past 12 months  · ALT result < 80  · LDL result <130   Recent Outpatient Visits            2 months ago Physical

## 2019-01-11 RX ORDER — ZOLPIDEM TARTRATE 5 MG/1
TABLET ORAL
Qty: 30 TABLET | Refills: 0 | OUTPATIENT
Start: 2019-01-11 | End: 2019-02-14

## 2019-01-21 RX ORDER — FOSINOPRIL SODIUM AND HYDROCHLOROTHIAZIDE 20; 12.5 MG/1; MG/1
1 TABLET ORAL
Qty: 90 TABLET | Refills: 3 | Status: SHIPPED | OUTPATIENT
Start: 2019-01-21 | End: 2020-01-30

## 2019-01-21 RX ORDER — ALPRAZOLAM 0.25 MG/1
TABLET ORAL
Qty: 60 TABLET | Refills: 0 | OUTPATIENT
Start: 2019-01-21 | End: 2019-02-20

## 2019-01-31 NOTE — H&P
[address] [facility] [address] Gastroenterology                                                                                                  Clinic History and Physical     Pa focal linear erosion in the proximal stomach, small sliding type hiatal hernia, H. pylori negative     October 2007 colonoscopy, findings: Left-sided hyperplastic colon polyp, uncomplicated sigmoid diverticulosis, 10-year recall     Social Hx:  + Former sm TAKE 1 TABLET BY MOUTH TWICE DAILY Disp: 60 tablet Rfl: 0   ATORVASTATIN 20 MG Oral Tab TAKE 1 TABLET BY MOUTH DAILY AT NIGHT Disp: 90 tablet Rfl: 0   ZOLPIDEM TARTRATE 5 MG Oral Tab TAKE 1 TABLET BY MOUTH EVERY DAY AT BEDTIME Disp: 30 tablet Rfl: 0   ESCI reviewed    Labs/Imaging:     Patient's labs and imaging were reviewed and discussed with patient today. See HPI and A&P for further details.      .  ASSESSMENT/PLAN:   Minus Prow is a 59year old year-old female pt of Dr. Jil Montano with history of hyp delayed/missed diagnosis, and alternatives to colonoscopy with the patient [who demonstrated understanding], including but not limited to the risks of bleeding, infection, pain, as well as the risks of anesthesia and perforation all leading to prolonged ho

## 2019-02-07 ENCOUNTER — TELEPHONE (OUTPATIENT)
Dept: GASTROENTEROLOGY | Facility: CLINIC | Age: 64
End: 2019-02-07

## 2019-02-07 ENCOUNTER — OFFICE VISIT (OUTPATIENT)
Dept: GASTROENTEROLOGY | Facility: CLINIC | Age: 64
End: 2019-02-07
Payer: COMMERCIAL

## 2019-02-07 VITALS
WEIGHT: 139 LBS | DIASTOLIC BLOOD PRESSURE: 75 MMHG | HEART RATE: 71 BPM | HEIGHT: 61 IN | SYSTOLIC BLOOD PRESSURE: 114 MMHG | BODY MASS INDEX: 26.24 KG/M2

## 2019-02-07 DIAGNOSIS — Z12.11 SCREENING FOR COLON CANCER: Primary | ICD-10-CM

## 2019-02-07 DIAGNOSIS — R10.13 EPIGASTRIC BURNING SENSATION: ICD-10-CM

## 2019-02-07 PROCEDURE — S0285 CNSLT BEFORE SCREEN COLONOSC: HCPCS | Performed by: NURSE PRACTITIONER

## 2019-02-07 PROCEDURE — 99212 OFFICE O/P EST SF 10 MIN: CPT | Performed by: NURSE PRACTITIONER

## 2019-02-07 NOTE — PATIENT INSTRUCTIONS
-Schedule colonoscopy w/Dr. Rincon Callao w/ IV Twilight or MAC  -Prep: Split dose Colyte or equivalent  -Anti-platelets and anti-coagulants: None  -Diabetes meds: None    ** If MAC @ [facility]/NE:    - HOLD Fosinopril the night before and/or the day of the proce

## 2019-02-07 NOTE — TELEPHONE ENCOUNTER
Scheduled for:  Colonoscopy [address]   Provider Name: Dr. Blanca Piper  Date:  4/12/19  Location:  [facility]  Sedation:  MAC  Time:  10:15 am, (pt is aware that [facility] will call the day before to confirm arrival time)  Prep: Colyte  Meds/Allergies Reconciled?:  Princess/APN revi

## 2019-02-14 DIAGNOSIS — R10.13 EPIGASTRIC BURNING SENSATION: ICD-10-CM

## 2019-02-14 RX ORDER — ZOLPIDEM TARTRATE 5 MG/1
TABLET ORAL
Qty: 30 TABLET | Refills: 0 | OUTPATIENT
Start: 2019-02-14 | End: 2019-03-25

## 2019-02-14 NOTE — TELEPHONE ENCOUNTER
Review pended refill request as it does not fall under a protocol.     Last Rx: 1/11/19  LOV: 9/28/18

## 2019-02-19 ENCOUNTER — APPOINTMENT (OUTPATIENT)
Dept: LAB | Facility: HOSPITAL | Age: 64
End: 2019-02-19
Attending: NURSE PRACTITIONER
Payer: COMMERCIAL

## 2019-02-19 PROCEDURE — 87338 HPYLORI STOOL AG IA: CPT | Performed by: NURSE PRACTITIONER

## 2019-02-20 RX ORDER — ALPRAZOLAM 0.25 MG/1
TABLET ORAL
Qty: 60 TABLET | Refills: 0 | OUTPATIENT
Start: 2019-02-20 | End: 2019-03-20

## 2019-02-21 NOTE — TELEPHONE ENCOUNTER
Controlled medication pending for review. If approved needs to be called in or faxed by on-site staff.     Last Rx: 1-21-19  LOV: 9-28-18

## 2019-02-22 LAB — HELICOBACTER PYLORI AG, FECAL: NEGATIVE

## 2019-02-25 RX ORDER — PANTOPRAZOLE SODIUM 20 MG/1
20 TABLET, DELAYED RELEASE ORAL
Qty: 30 TABLET | Refills: 5 | Status: SHIPPED | OUTPATIENT
Start: 2019-02-25 | End: 2019-03-27

## 2019-03-05 RX ORDER — ATORVASTATIN CALCIUM 20 MG/1
TABLET, FILM COATED ORAL
Qty: 30 TABLET | Refills: 0 | Status: SHIPPED | OUTPATIENT
Start: 2019-03-05 | End: 2019-04-10

## 2019-03-21 RX ORDER — ALPRAZOLAM 0.25 MG/1
TABLET ORAL
Qty: 60 TABLET | Refills: 0 | OUTPATIENT
Start: 2019-03-21 | End: 2019-04-19

## 2019-03-21 NOTE — TELEPHONE ENCOUNTER
Controlled medication pending for review. If approved needs to be called in or faxed by on-site staff.     Last Rx: 2-20-19  LOV: 9-28-18

## 2019-03-25 PROBLEM — M50.30 DEGENERATION OF CERVICAL INTERVERTEBRAL DISC: Status: ACTIVE | Noted: 2019-03-25

## 2019-03-25 RX ORDER — ZOLPIDEM TARTRATE 5 MG/1
TABLET ORAL
Qty: 30 TABLET | Refills: 0 | OUTPATIENT
Start: 2019-03-25 | End: 2019-05-08

## 2019-03-25 NOTE — TELEPHONE ENCOUNTER
Controlled medication pending for review. If approved needs to be called in or faxed by on-site staff.     Last Rx: 2/14/2019  LOV: 9/28/18    Please respond to pool: ZAIN [address] LPN/CMA

## 2019-03-29 ENCOUNTER — OFFICE VISIT (OUTPATIENT)
Dept: DERMATOLOGY CLINIC | Facility: CLINIC | Age: 64
End: 2019-03-29
Payer: COMMERCIAL

## 2019-03-29 DIAGNOSIS — D23.10: Primary | ICD-10-CM

## 2019-03-29 DIAGNOSIS — L82.1 SEBORRHEIC KERATOSES: ICD-10-CM

## 2019-03-29 DIAGNOSIS — L72.0 MILIA: ICD-10-CM

## 2019-03-29 DIAGNOSIS — D23.10 SYRINGOMA OF LEFT EYELID: ICD-10-CM

## 2019-03-29 PROCEDURE — 99202 OFFICE O/P NEW SF 15 MIN: CPT | Performed by: DERMATOLOGY

## 2019-03-29 PROCEDURE — 99212 OFFICE O/P EST SF 10 MIN: CPT | Performed by: DERMATOLOGY

## 2019-04-08 NOTE — PROGRESS NOTES
Nano Little is a 59year old female. HPI:     CC:  Patient presents with:  Lesion: New pt presenting with rasied lesions to bilateral lower eyelids. Allergies:  Patient has no known allergies.     HISTORY:    Past Medical History:   Diagnosis 3350-[id number]-NaCl-NaSulf (COLYTE WITH FLAVOR PACKS) 240 g Oral Recon Soln As directed per GI consult notes Disp: 1 Bottle Rfl: 0     Allergies:   No Known Allergies    Past Medical History:   Diagnosis Date   • Anxiety    • Chicken pox    • Heart murmur violence:        Fear of current or ex partner: Not on file        Emotionally abused: Not on file        Physically abused: Not on file        Forced sexual activity: Not on file    Other Topics      Concerns:         Service: Not Asked        Blo including scalp, head, neck, face,nails, hair, external eyes, including conjunctival mucosa, eyelids, lips external ears , arms, digits,palms.      Multiple light to medium brown, well marginated, uniformly pigmented, macules and papules 6 mm and less scatt angiomas:  Reassurance regarding other benign skin lesions. Signs and symptoms of skin cancer, ABCDE's of melanoma discussed with patient. Sunscreen use, sun protection, self exams reviewed. Followup as noted RTC ---routine checkup    6 mos -one year or p.

## 2019-04-11 RX ORDER — ATORVASTATIN CALCIUM 20 MG/1
TABLET, FILM COATED ORAL
Qty: 30 TABLET | Refills: 11 | Status: SHIPPED | OUTPATIENT
Start: 2019-04-11 | End: 2019-12-05

## 2019-04-12 ENCOUNTER — LAB REQUISITION (OUTPATIENT)
Dept: LAB | Facility: HOSPITAL | Age: 64
End: 2019-04-12
Payer: COMMERCIAL

## 2019-04-12 DIAGNOSIS — Z01.89 ENCOUNTER FOR OTHER SPECIFIED SPECIAL EXAMINATIONS: ICD-10-CM

## 2019-04-12 PROCEDURE — 88305 TISSUE EXAM BY PATHOLOGIST: CPT | Performed by: INTERNAL MEDICINE

## 2019-04-18 ENCOUNTER — TELEPHONE (OUTPATIENT)
Dept: GASTROENTEROLOGY | Facility: CLINIC | Age: 64
End: 2019-04-18

## 2019-04-18 NOTE — TELEPHONE ENCOUNTER
----- Message from Liza Rodriguez MD sent at 4/17/2019  7:10 PM CDT -----  As per MECHE parameters, I left a message on the patient's cell phone voicemail. The small rectal polyp was hyperplastic. This is of no consequence.   Uncomplicated diverticulo

## 2019-04-20 RX ORDER — ALPRAZOLAM 0.25 MG/1
TABLET ORAL
Qty: 60 TABLET | Refills: 0 | OUTPATIENT
Start: 2019-04-20 | End: 2019-05-17

## 2019-04-20 NOTE — TELEPHONE ENCOUNTER
Script called in to pt pharmacy.      ALPRAZOLAM 0.25 MG Oral Tab 60 tablet 0 4/20/2019     Sig: TAKE 1 TABLET BY MOUTH TWICE DAILY

## 2019-04-20 NOTE — TELEPHONE ENCOUNTER
Controlled medications pending for review. If approved needs to be called in or faxed by on site staff.     Last Rx: 3-21-19 # 60  LOV: 9-28-18    Requested Prescriptions     Pending Prescriptions Disp Refills   • ALPRAZOLAM 0.25 MG Oral Tab [Pharmacy Med N

## 2019-05-09 RX ORDER — ZOLPIDEM TARTRATE 5 MG/1
TABLET ORAL
Qty: 30 TABLET | Refills: 0 | OUTPATIENT
Start: 2019-05-09 | End: 2019-06-20

## 2019-05-09 NOTE — TELEPHONE ENCOUNTER
Controlled medication pending for review. If approved needs to be called in or faxed by on-site staff.     Requested Prescriptions     Pending Prescriptions Disp Refills   • ZOLPIDEM TARTRATE 5 MG Oral Tab [Pharmacy Med Name: ZOLPIDEM 5MG TABLETS] 30 table

## 2019-05-18 NOTE — TELEPHONE ENCOUNTER
Medication pending for review. If approved needs to be called in or faxed by on-site staff. Last RX: 04/20/19  LOV: 09/28/18    Routed to PCP and provider as it requires provider signature.

## 2019-05-20 RX ORDER — ALPRAZOLAM 0.25 MG/1
TABLET ORAL
Qty: 60 TABLET | Refills: 0 | OUTPATIENT
Start: 2019-05-20 | End: 2019-06-21

## 2019-05-21 NOTE — TELEPHONE ENCOUNTER
Script called in to pt pharmacy.       ALPRAZOLAM 0.25 MG Oral Tab 60 tablet 0 5/20/2019     Sig: TAKE 1 TABLET BY MOUTH TWICE DAILY    Class: Phone in

## 2019-06-21 RX ORDER — ZOLPIDEM TARTRATE 5 MG/1
TABLET ORAL
Qty: 30 TABLET | Refills: 0 | OUTPATIENT
Start: 2019-06-21 | End: 2019-08-13

## 2019-06-21 NOTE — TELEPHONE ENCOUNTER
Rx for Zolpidem Tartrate 5 mg called in to [address] [place] [address] as approved by Dr. Bonita Conner.

## 2019-06-22 RX ORDER — ALPRAZOLAM 0.25 MG/1
TABLET ORAL
Qty: 60 TABLET | Refills: 0 | OUTPATIENT
Start: 2019-06-22 | End: 2019-07-25

## 2019-07-26 RX ORDER — ALPRAZOLAM 0.25 MG/1
TABLET ORAL
Qty: 60 TABLET | Refills: 0 | OUTPATIENT
Start: 2019-07-26 | End: 2019-08-29

## 2019-07-26 NOTE — TELEPHONE ENCOUNTER
ALPRAZOLAM 0.25 MG Oral Tab 60 tablet 0 7/26/2019    Sig:   TAKE 1 TABLET BY MOUTH TWICE DAILY     Route:   (none)     Class:   Phone in       Script called in to pt pharmacy.

## 2019-07-26 NOTE — TELEPHONE ENCOUNTER
Controlled medications pending for review. If approved needs to be called in or faxed by on site staff.     Last Rx: 6-22-19 # 60  LOV: 9-28-18    Requested Prescriptions     Pending Prescriptions Disp Refills   • ALPRAZOLAM 0.25 MG Oral Tab [Pharmacy Med N

## 2019-08-13 RX ORDER — ZOLPIDEM TARTRATE 5 MG/1
TABLET ORAL
Qty: 30 TABLET | Refills: 0 | OUTPATIENT
Start: 2019-08-13 | End: 2019-10-11

## 2019-08-13 NOTE — TELEPHONE ENCOUNTER
Controlled medication pending for review. If approved needs to be called in or faxed by on-site staff.     Last Rx: 6/21/19  LOV: 9/28/2018

## 2019-08-14 NOTE — TELEPHONE ENCOUNTER
ZOLPIDEM TARTRATE 5 MG Oral Tab 30 tablet 0 8/13/2019     Sig: TAKE 1 TABLET BY MOUTH EVERY DAY AT BEDTIME      Script called in to pt pharmacy.

## 2019-08-29 RX ORDER — ALPRAZOLAM 0.25 MG/1
TABLET ORAL
Qty: 60 TABLET | Refills: 0 | OUTPATIENT
Start: 2019-08-29 | End: 2019-09-26

## 2019-08-29 NOTE — TELEPHONE ENCOUNTER
Rx for Aplrazolam 0.25 mg oral tab called in to [facility] #[id number] as approved by [facility].

## 2019-08-29 NOTE — TELEPHONE ENCOUNTER
Controlled medication pending for review. If approved needs to be called in or faxed by on-site staff.     Please respond to pool: EM IM [address] LPN/CMA    Requested Prescriptions     Pending Prescriptions Disp Refills   • ALPRAZOLAM 0.25 MG Oral Tab [Pharmacy

## 2019-09-26 RX ORDER — ALPRAZOLAM 0.25 MG/1
TABLET ORAL
Qty: 60 TABLET | Refills: 0 | OUTPATIENT
Start: 2019-09-26 | End: 2019-10-29

## 2019-10-26 RX ORDER — ZOLPIDEM TARTRATE 5 MG/1
TABLET ORAL
Qty: 30 TABLET | Refills: 0 | OUTPATIENT
Start: 2019-10-26 | End: 2020-01-10

## 2019-10-26 NOTE — TELEPHONE ENCOUNTER
Review pended refill request.  It does not fall under a protocol. Dr Tarik Pierce: please review, patient has appt with you 11/5/19. If you approve, or deny rx.

## 2019-10-30 RX ORDER — ALPRAZOLAM 0.25 MG/1
TABLET ORAL
Qty: 60 TABLET | Refills: 0 | OUTPATIENT
Start: 2019-10-30 | End: 2019-12-05

## 2019-11-05 ENCOUNTER — OFFICE VISIT (OUTPATIENT)
Dept: INTERNAL MEDICINE CLINIC | Facility: CLINIC | Age: 64
End: 2019-11-05
Payer: COMMERCIAL

## 2019-11-05 VITALS
BODY MASS INDEX: 26.43 KG/M2 | WEIGHT: 140 LBS | SYSTOLIC BLOOD PRESSURE: 138 MMHG | DIASTOLIC BLOOD PRESSURE: 82 MMHG | HEIGHT: 61 IN | TEMPERATURE: 98 F | HEART RATE: 69 BPM

## 2019-11-05 DIAGNOSIS — E78.5 HYPERLIPIDEMIA, UNSPECIFIED HYPERLIPIDEMIA TYPE: ICD-10-CM

## 2019-11-05 DIAGNOSIS — Z00.00 PHYSICAL EXAM: Primary | ICD-10-CM

## 2019-11-05 DIAGNOSIS — Z78.0 POSTMENOPAUSAL: ICD-10-CM

## 2019-11-05 DIAGNOSIS — Z12.31 VISIT FOR SCREENING MAMMOGRAM: ICD-10-CM

## 2019-11-05 DIAGNOSIS — F41.1 ANXIETY STATE: ICD-10-CM

## 2019-11-05 DIAGNOSIS — I10 ESSENTIAL HYPERTENSION: ICD-10-CM

## 2019-11-05 DIAGNOSIS — E55.9 VITAMIN D DEFICIENCY: ICD-10-CM

## 2019-11-05 PROCEDURE — 90471 IMMUNIZATION ADMIN: CPT | Performed by: INTERNAL MEDICINE

## 2019-11-05 PROCEDURE — 90686 IIV4 VACC NO PRSV 0.5 ML IM: CPT | Performed by: INTERNAL MEDICINE

## 2019-11-05 PROCEDURE — 99396 PREV VISIT EST AGE 40-64: CPT | Performed by: INTERNAL MEDICINE

## 2019-11-06 NOTE — PROGRESS NOTES
HPI:    Patient ID: Glenn Suarez is a 62year old female.     HPI  Physical examination patient under stress her sister is in a call monitor at [facility] when she came in her blood pressure is very high  She has history of anxiety feels well BEDTIME 30 tablet 0   • ATORVASTATIN 20 MG Oral Tab TAKE 1 TABLET BY MOUTH DAILY AT NIGHT 30 tablet 11   • FOSINOPRIL SODIUM-HCTZ 20-12.5 MG Oral Tab TAKE 1 TABLET BY MOUTH ONCE DAILY.  90 tablet 3   • ATORVASTATIN 20 MG Oral Tab TAKE 1 TABLET BY MOUTH CONNIE discharge. Left eye exhibits no discharge. No scleral icterus. Neck: Neck supple. No thyromegaly present. Cardiovascular: Normal rate, regular rhythm, S1 normal, S2 normal, normal heart sounds and intact distal pulses. Exam reveals no gallop.    No murm Postmenopausal  Plan: XR DEXA BONE DENSITOMETRY (CPT=77080)        asymptomatic    Patient voiced understanding  and agrees with plan        Orders Placed This Encounter      Assay, Thyroid Stim Hormone      Free T4, (Free Thyroxine)      Lipid Panel

## 2019-12-05 ENCOUNTER — HOSPITAL ENCOUNTER (OUTPATIENT)
Dept: BONE DENSITY | Age: 64
Discharge: HOME | End: 2019-12-05
Attending: INTERNAL MEDICINE
Payer: COMMERCIAL

## 2019-12-05 ENCOUNTER — APPOINTMENT (OUTPATIENT)
Dept: LAB | Age: 64
End: 2019-12-05
Attending: INTERNAL MEDICINE
Payer: COMMERCIAL

## 2019-12-05 ENCOUNTER — HOSPITAL ENCOUNTER (OUTPATIENT)
Dept: MAMMOGRAPHY | Age: 64
Discharge: HOME | End: 2019-12-05
Attending: INTERNAL MEDICINE
Payer: COMMERCIAL

## 2019-12-05 ENCOUNTER — HOSPITAL ENCOUNTER (OUTPATIENT)
Dept: GENERAL RADIOLOGY | Facility: HOSPITAL | Age: 64
Discharge: HOME | End: 2019-12-05
Attending: ORTHOPAEDIC SURGERY
Payer: COMMERCIAL

## 2019-12-05 ENCOUNTER — OFFICE VISIT (OUTPATIENT)
Dept: ORTHOPEDICS CLINIC | Facility: CLINIC | Age: 64
End: 2019-12-05
Payer: COMMERCIAL

## 2019-12-05 VITALS
SYSTOLIC BLOOD PRESSURE: 163 MMHG | HEART RATE: 73 BPM | DIASTOLIC BLOOD PRESSURE: 95 MMHG | HEIGHT: 61 IN | WEIGHT: 135 LBS | BODY MASS INDEX: 25.49 KG/M2

## 2019-12-05 DIAGNOSIS — Z00.00 PHYSICAL EXAM: ICD-10-CM

## 2019-12-05 DIAGNOSIS — Z78.0 POSTMENOPAUSAL: ICD-10-CM

## 2019-12-05 DIAGNOSIS — M19.012 PRIMARY OSTEOARTHRITIS OF LEFT SHOULDER: Primary | ICD-10-CM

## 2019-12-05 DIAGNOSIS — M25.512 LEFT SHOULDER PAIN, UNSPECIFIED CHRONICITY: ICD-10-CM

## 2019-12-05 DIAGNOSIS — Z12.31 VISIT FOR SCREENING MAMMOGRAM: ICD-10-CM

## 2019-12-05 DIAGNOSIS — E55.9 VITAMIN D DEFICIENCY: ICD-10-CM

## 2019-12-05 PROCEDURE — 77067 SCR MAMMO BI INCL CAD: CPT | Performed by: INTERNAL MEDICINE

## 2019-12-05 PROCEDURE — 82306 VITAMIN D 25 HYDROXY: CPT

## 2019-12-05 PROCEDURE — 77063 BREAST TOMOSYNTHESIS BI: CPT | Performed by: INTERNAL MEDICINE

## 2019-12-05 PROCEDURE — 81015 MICROSCOPIC EXAM OF URINE: CPT

## 2019-12-05 PROCEDURE — 80053 COMPREHEN METABOLIC PANEL: CPT

## 2019-12-05 PROCEDURE — 83036 HEMOGLOBIN GLYCOSYLATED A1C: CPT

## 2019-12-05 PROCEDURE — 77080 DXA BONE DENSITY AXIAL: CPT | Performed by: INTERNAL MEDICINE

## 2019-12-05 PROCEDURE — 73030 X-RAY EXAM OF SHOULDER: CPT | Performed by: ORTHOPAEDIC SURGERY

## 2019-12-05 PROCEDURE — 85027 COMPLETE CBC AUTOMATED: CPT

## 2019-12-05 PROCEDURE — 99213 OFFICE O/P EST LOW 20 MIN: CPT | Performed by: ORTHOPAEDIC SURGERY

## 2019-12-05 PROCEDURE — 80061 LIPID PANEL: CPT

## 2019-12-05 PROCEDURE — 84439 ASSAY OF FREE THYROXINE: CPT

## 2019-12-05 PROCEDURE — 36415 COLL VENOUS BLD VENIPUNCTURE: CPT

## 2019-12-05 PROCEDURE — 20610 DRAIN/INJ JOINT/BURSA W/O US: CPT | Performed by: ORTHOPAEDIC SURGERY

## 2019-12-05 PROCEDURE — 84443 ASSAY THYROID STIM HORMONE: CPT

## 2019-12-05 RX ORDER — TRIAMCINOLONE ACETONIDE 40 MG/ML
40 INJECTION, SUSPENSION INTRA-ARTICULAR; INTRAMUSCULAR ONCE
Status: COMPLETED | OUTPATIENT
Start: 2019-12-05 | End: 2019-12-05

## 2019-12-05 NOTE — PROGRESS NOTES
Verbal order given by Dr. Condon Child to draw up 3 cc 0.5% marcaine, 2 cc 1% lidocaine, and 1 cc kenalog 40 for a left shoulder injection.

## 2019-12-05 NOTE — PROGRESS NOTES
NURSING INTAKE COMMENTS: Patient presents with:  Consult: C/o left shoulder pain for 2 years. Has not reinjured left shoulder. Last shoulder xray is on 1/14. Last seen by Dr. Keeley Goss on 6/10 and rec'd a cortisone injection.   Injection lasted for about 4 (Other) Paternal Grandmother    • Other (Other) Paternal Grandfather    • Hypertension Sister 67       Social History    Occupational History      Not on file    Tobacco Use      Smoking status: Former Smoker        Types: Cigarettes      Smokeless tobacco she is tender at the anterior glenohumeral joint line. AC joint nontender. Active forward flexion of the shoulders to 110 degrees passive flexion 115 degrees. Active and passive external rotation 45 degrees with palpable crepitus and with pain.   Jane Vidales exclude secondary causes.  * Low bone mass (T score between -1.0 and -2.5 at the femoral neck or spine) and a 10-year probability of a hip fracture > 3% or a 10-year probability of a major osteoporosis-related fracture > 20% based on the [place]-adapted WHO algo mammography was performed and images were reviewed with the R2 GLENROY [de-identified] CAD device. 3D tomosynthesis was performed and reviewed   BREAST COMPOSITION:   CATEGORY b- There are scattered areas of fibroglandular density.    FINDINGS:   Right breast:  The ri GFRAA 80 12/05/2019        Assessment and Plan:  Diagnoses and all orders for this visit:    Primary osteoarthritis of left shoulder  -     DRAIN/INJECT LARGE JOINT/BURSA  -     triamcinolone acetonide (KENALOG-40) 40 MG/ML injection 40 mg    Left shoul

## 2019-12-06 RX ORDER — ALPRAZOLAM 0.25 MG/1
TABLET ORAL
Qty: 60 TABLET | Refills: 0 | OUTPATIENT
Start: 2019-12-06 | End: 2020-01-07

## 2019-12-10 DIAGNOSIS — E55.9 VITAMIN D DEFICIENCY: Primary | ICD-10-CM

## 2019-12-10 RX ORDER — ERGOCALCIFEROL (VITAMIN D2) 1250 MCG
50000 CAPSULE ORAL WEEKLY
Qty: 12 CAPSULE | Refills: 0 | Status: SHIPPED | OUTPATIENT
Start: 2019-12-10 | End: 2020-02-26

## 2019-12-23 ENCOUNTER — NURSE TRIAGE (OUTPATIENT)
Dept: INTERNAL MEDICINE CLINIC | Facility: CLINIC | Age: 64
End: 2019-12-23

## 2019-12-23 ENCOUNTER — OFFICE VISIT (OUTPATIENT)
Dept: INTERNAL MEDICINE CLINIC | Facility: CLINIC | Age: 64
End: 2019-12-23
Payer: COMMERCIAL

## 2019-12-23 ENCOUNTER — LAB ENCOUNTER (OUTPATIENT)
Dept: LAB | Age: 64
End: 2019-12-23
Attending: PHYSICIAN ASSISTANT
Payer: COMMERCIAL

## 2019-12-23 ENCOUNTER — TELEPHONE (OUTPATIENT)
Dept: INTERNAL MEDICINE CLINIC | Facility: CLINIC | Age: 64
End: 2019-12-23

## 2019-12-23 VITALS
SYSTOLIC BLOOD PRESSURE: 121 MMHG | HEART RATE: 74 BPM | WEIGHT: 136 LBS | DIASTOLIC BLOOD PRESSURE: 71 MMHG | HEIGHT: 61 IN | TEMPERATURE: 98 F | BODY MASS INDEX: 25.68 KG/M2

## 2019-12-23 DIAGNOSIS — N30.00 ACUTE CYSTITIS WITHOUT HEMATURIA: Primary | ICD-10-CM

## 2019-12-23 DIAGNOSIS — R35.0 URINARY FREQUENCY: Primary | ICD-10-CM

## 2019-12-23 DIAGNOSIS — R35.0 URINARY FREQUENCY: ICD-10-CM

## 2019-12-23 PROCEDURE — 87086 URINE CULTURE/COLONY COUNT: CPT

## 2019-12-23 PROCEDURE — 87186 SC STD MICRODIL/AGAR DIL: CPT

## 2019-12-23 PROCEDURE — 87077 CULTURE AEROBIC IDENTIFY: CPT

## 2019-12-23 PROCEDURE — 81001 URINALYSIS AUTO W/SCOPE: CPT

## 2019-12-23 PROCEDURE — 99213 OFFICE O/P EST LOW 20 MIN: CPT | Performed by: PHYSICIAN ASSISTANT

## 2019-12-23 RX ORDER — NITROFURANTOIN 25; 75 MG/1; MG/1
100 CAPSULE ORAL 2 TIMES DAILY
Qty: 14 CAPSULE | Refills: 0 | Status: SHIPPED | OUTPATIENT
Start: 2019-12-23 | End: 2020-06-19

## 2019-12-23 NOTE — PROGRESS NOTES
HPI:    Patient ID: Shirley Brothers is a 62year old female. HPI   Patient presents with UTI sx of burning with urination, frequency, urgency. Denies any Fever, back pain or flank pain. Has not been drinking as much water as she usually does.      Jodie 1 SITE RIGHT (CPT=19281)     • OTHER SURGICAL HISTORY      breast biopsy    • THYROIDECTOMY     • UPPER GI ENDOSCOPY PERFORMED  5/2015   \  Family History   Problem Relation Age of Onset   • Diabetes Sister    • Hypertension Father    • Heart Disorder Moth placed in this encounter. No follow-ups on file.     Meds This Visit:  Requested Prescriptions     Signed Prescriptions Disp Refills   • Nitrofurantoin Monohyd Macro 100 MG Oral Cap 14 capsule 0     Sig: Take 1 capsule (100 mg total) by mouth 2 (two) harris

## 2019-12-23 NOTE — TELEPHONE ENCOUNTER
Patient is reporting symptoms of a UTI (painful urination, constant urge to go to the restroom, but little to nothing comes out). Patient is wanting to speak with a nurse to see if she needs to come in. Transferring to triage.

## 2019-12-23 NOTE — TELEPHONE ENCOUNTER
This nurse received a call from the patient; the call then dropped as soon as this nurse answered. Attempt made to contact the patient; no answer left message requesting a call back.

## 2019-12-23 NOTE — TELEPHONE ENCOUNTER
Action Requested: Summary for Provider     []  Critical Lab, Recommendations Needed  [] Need Additional Advice  []   FYI    [x]   Need Orders  [] Need Medications Sent to Pharmacy  []  Other     SUMMARY: Patient has appt with Flotarunene Beer for 4PM for ur

## 2020-01-07 RX ORDER — ALPRAZOLAM 0.25 MG/1
TABLET ORAL
Qty: 60 TABLET | Refills: 0 | OUTPATIENT
Start: 2020-01-07 | End: 2020-02-07

## 2020-01-10 RX ORDER — ZOLPIDEM TARTRATE 5 MG/1
TABLET ORAL
Qty: 30 TABLET | Refills: 0 | OUTPATIENT
Start: 2020-01-10 | End: 2020-04-30

## 2020-01-10 NOTE — TELEPHONE ENCOUNTER
Pt's spouse asking for Zolpidem refill request for pt, states pt is out of med at this time and refill was requested two weeks ago, no request noted in EMR. Controlled medication pending for review.   Please change to phone in, fax, or print script if no

## 2020-01-22 ENCOUNTER — TELEPHONE (OUTPATIENT)
Dept: INTERNAL MEDICINE CLINIC | Facility: CLINIC | Age: 65
End: 2020-01-22

## 2020-01-22 DIAGNOSIS — M79.674 PAIN IN RIGHT TOE(S): Primary | ICD-10-CM

## 2020-01-22 NOTE — TELEPHONE ENCOUNTER
Dr. Linus Salter,    Patient called requesting referral to be seen by Podiatry for right toe pain. Please advise and sign off on referral if you agree. Thank you, James Barth Specialist    Managed Care.

## 2020-01-30 RX ORDER — FOSINOPRIL SODIUM AND HYDROCHLOROTHIAZIDE 20; 12.5 MG/1; MG/1
1 TABLET ORAL
Qty: 90 TABLET | Refills: 1 | Status: SHIPPED | OUTPATIENT
Start: 2020-01-30 | End: 2020-07-27

## 2020-01-31 NOTE — TELEPHONE ENCOUNTER
Refill passed per [facility] protocol.   Hypertensive Medications  Protocol Criteria:  · Appointment scheduled in the past 6 months or in the next 3 months  · BMP or CMP in the past 12 months  · Creatinine result < 2  Recent Outpatient Visits

## 2020-02-04 ENCOUNTER — HOSPITAL ENCOUNTER (OUTPATIENT)
Age: 65
Discharge: HOME | End: 2020-02-04
Payer: COMMERCIAL

## 2020-02-04 ENCOUNTER — NURSE TRIAGE (OUTPATIENT)
Dept: INTERNAL MEDICINE CLINIC | Facility: CLINIC | Age: 65
End: 2020-02-04

## 2020-02-04 VITALS
DIASTOLIC BLOOD PRESSURE: 85 MMHG | RESPIRATION RATE: 18 BRPM | SYSTOLIC BLOOD PRESSURE: 157 MMHG | OXYGEN SATURATION: 97 % | TEMPERATURE: 98 F | HEART RATE: 83 BPM

## 2020-02-04 DIAGNOSIS — N30.90 CYSTITIS: Primary | ICD-10-CM

## 2020-02-04 LAB
BILIRUB UR QL STRIP: NEGATIVE
COLOR UR: YELLOW
GLUCOSE UR STRIP-MCNC: NEGATIVE MG/DL
KETONES UR STRIP-MCNC: NEGATIVE MG/DL
NITRITE UR QL STRIP: NEGATIVE
PH UR STRIP: 7 [PH]
PROT UR STRIP-MCNC: >=300 MG/DL
SP GR UR STRIP: 1.02
UROBILINOGEN UR STRIP-ACNC: <2 MG/DL

## 2020-02-04 PROCEDURE — 99214 OFFICE O/P EST MOD 30 MIN: CPT

## 2020-02-04 PROCEDURE — 87086 URINE CULTURE/COLONY COUNT: CPT | Performed by: NURSE PRACTITIONER

## 2020-02-04 PROCEDURE — 99204 OFFICE O/P NEW MOD 45 MIN: CPT

## 2020-02-04 PROCEDURE — 81002 URINALYSIS NONAUTO W/O SCOPE: CPT

## 2020-02-04 PROCEDURE — 87186 SC STD MICRODIL/AGAR DIL: CPT | Performed by: NURSE PRACTITIONER

## 2020-02-04 PROCEDURE — 87077 CULTURE AEROBIC IDENTIFY: CPT | Performed by: NURSE PRACTITIONER

## 2020-02-04 RX ORDER — CEPHALEXIN 500 MG/1
500 CAPSULE ORAL 3 TIMES DAILY
Qty: 21 CAPSULE | Refills: 0 | Status: SHIPPED | OUTPATIENT
Start: 2020-02-04 | End: 2020-02-11

## 2020-02-04 NOTE — ED INITIAL ASSESSMENT (HPI)
C/o dysuria and frequency starting today. No fever but c/o chills. Left flank pain radiating to LUQ. No nausea.

## 2020-02-04 NOTE — TELEPHONE ENCOUNTER
Action Requested: Summary for Provider     []  Critical Lab, Recommendations Needed  [] Need Additional Advice  [x]   FYI    []   Need Orders  [] Need Medications Sent to Pharmacy  []  Other     SUMMARY: Per protocol advised  : Go to office enow, no appt a

## 2020-02-05 NOTE — ED PROVIDER NOTES
Patient presents with:  Urinary Symptoms  Abdomen/Flank Pain      HPI:     Estefani Campos is a 62year old female who presents with a chief complaint of dysuria, urgency, and frequency that started today.   She states she had a urinary tract infection a f activity: Not on file    Lifestyle      Physical activity:        Days per week: Not on file        Minutes per session: Not on file      Stress: Not on file    Relationships      Social connections:        Talks on phone: Not on file        Gets together: auscultation, no RRW  CARDIO: RRR without murmur  EXTREMITIES: no cyanosis or edema.  JIM without difficulty  BACK: CVA tenderness: Bilaterally, No  GI: soft, non-tender, without masses or organomegaly        MDM/Assessment/Plan:   Orders for this encounter days

## 2020-02-05 NOTE — TELEPHONE ENCOUNTER
Discharge         Home or Self Care         ED/IC AVS (Printed 2/4/2020)         Follow-Ups: Schedule an appointment with Checo Cervantes MD (Internal Medicine) in 2 days (2/6/2020)   Medication Changes         Cephalexin 500 mg Oral 3 times daily      Me

## 2020-02-07 RX ORDER — ALPRAZOLAM 0.25 MG/1
TABLET ORAL
Qty: 60 TABLET | Refills: 0 | OUTPATIENT
Start: 2020-02-07 | End: 2020-03-07

## 2020-03-03 ENCOUNTER — TELEPHONE (OUTPATIENT)
Dept: INTERNAL MEDICINE CLINIC | Facility: CLINIC | Age: 65
End: 2020-03-03

## 2020-03-03 NOTE — TELEPHONE ENCOUNTER
Patient is requesting orders below:    -Urinalysis/Urine Culture    Patient is expressing concern that she had two UTIs in the span of 6 weeks and wants to know if Dr. Diony Dumont wants her to do more testing prior to her 3/13 appointment so they can discuss results. Please call back with status update.

## 2020-03-07 ENCOUNTER — APPOINTMENT (OUTPATIENT)
Dept: LAB | Age: 65
End: 2020-03-07
Attending: INTERNAL MEDICINE
Payer: COMMERCIAL

## 2020-03-07 DIAGNOSIS — E55.9 VITAMIN D DEFICIENCY: ICD-10-CM

## 2020-03-07 PROCEDURE — 82306 VITAMIN D 25 HYDROXY: CPT

## 2020-03-07 PROCEDURE — 36415 COLL VENOUS BLD VENIPUNCTURE: CPT

## 2020-03-07 NOTE — TELEPHONE ENCOUNTER
Controlled medication pending for review. Please change to phone in, fax, or print script if not being sent electronically.     Last Rx: 02/07/20  LOV: 12/23/19

## 2020-03-09 LAB — 25(OH)D3 SERPL-MCNC: 43.6 NG/ML (ref 30–100)

## 2020-03-09 RX ORDER — ALPRAZOLAM 0.25 MG/1
TABLET ORAL
Qty: 60 TABLET | Refills: 0 | OUTPATIENT
Start: 2020-03-09 | End: 2020-04-09

## 2020-03-13 ENCOUNTER — OFFICE VISIT (OUTPATIENT)
Dept: INTERNAL MEDICINE CLINIC | Facility: CLINIC | Age: 65
End: 2020-03-13

## 2020-03-13 VITALS
DIASTOLIC BLOOD PRESSURE: 82 MMHG | WEIGHT: 147 LBS | TEMPERATURE: 98 F | BODY MASS INDEX: 27.75 KG/M2 | HEART RATE: 85 BPM | HEIGHT: 61 IN | SYSTOLIC BLOOD PRESSURE: 124 MMHG

## 2020-03-13 DIAGNOSIS — G89.29 CHRONIC LEFT SHOULDER PAIN: ICD-10-CM

## 2020-03-13 DIAGNOSIS — E55.9 VITAMIN D DEFICIENCY: ICD-10-CM

## 2020-03-13 DIAGNOSIS — N39.0 RECURRENT UTI: Primary | ICD-10-CM

## 2020-03-13 DIAGNOSIS — F41.1 ANXIETY STATE: ICD-10-CM

## 2020-03-13 DIAGNOSIS — I10 ESSENTIAL HYPERTENSION: ICD-10-CM

## 2020-03-13 DIAGNOSIS — M25.512 CHRONIC LEFT SHOULDER PAIN: ICD-10-CM

## 2020-03-13 PROCEDURE — 99214 OFFICE O/P EST MOD 30 MIN: CPT | Performed by: INTERNAL MEDICINE

## 2020-03-13 NOTE — PROGRESS NOTES
HPI:    Patient ID: Evonne Collazo is a 62year old female.     HPI    Currently no sx of UTI    HTN  Long standing history of hypertension     sympotms  :        Headache no  dizziness        no                             Blurred vision no  palpitaionsS Current Outpatient Medications   Medication Sig Dispense Refill   • ALPRAZOLAM 0.25 MG Oral Tab TAKE 1 TABLET BY MOUTH TWICE DAILY 60 tablet 0   • FOSINOPRIL SODIUM-HCTZ 20-12.5 MG Oral Tab TAKE 1 TABLET BY MOUTH ONCE DAILY.  90 tablet 1   • Zolpide Ear: External ear normal.   Nose: Nose normal.   Mouth/Throat: Oropharynx is clear and moist. No oropharyngeal exudate. Eyes: Pupils are equal, round, and reactive to light. Conjunctivae and EOM are normal. Right eye exhibits no discharge.  Left eye exhib

## 2020-03-16 ENCOUNTER — TELEPHONE (OUTPATIENT)
Dept: INTERNAL MEDICINE CLINIC | Facility: CLINIC | Age: 65
End: 2020-03-16

## 2020-03-16 NOTE — TELEPHONE ENCOUNTER
Pt calling regarding COVID-19 testing and could she be more susceptible because of history of HTN. 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  Pt denies any sob, cough,

## 2020-03-17 NOTE — TELEPHONE ENCOUNTER
No further recommendations beyond CDC guidelines. Any pre-existing condition can make a patient more susceptible which makes it all the more important to follow the CDC guidelines.

## 2020-03-28 ENCOUNTER — LAB ENCOUNTER (OUTPATIENT)
Dept: LAB | Age: 65
End: 2020-03-28
Attending: INTERNAL MEDICINE
Payer: COMMERCIAL

## 2020-03-28 DIAGNOSIS — M25.512 CHRONIC LEFT SHOULDER PAIN: ICD-10-CM

## 2020-03-28 DIAGNOSIS — G89.29 CHRONIC LEFT SHOULDER PAIN: ICD-10-CM

## 2020-03-28 LAB
BILIRUB UR QL: NEGATIVE
COLOR UR: YELLOW
GLUCOSE UR-MCNC: NEGATIVE MG/DL
HGB UR QL STRIP.AUTO: NEGATIVE
KETONES UR-MCNC: NEGATIVE MG/DL
NITRITE UR QL STRIP.AUTO: NEGATIVE
PH UR: 5 [PH] (ref 5–8)
PROT UR-MCNC: NEGATIVE MG/DL
RBC #/AREA URNS AUTO: 4 /HPF
SP GR UR STRIP: 1.03 (ref 1–1.03)
UROBILINOGEN UR STRIP-ACNC: 2
WBC #/AREA URNS AUTO: 50 /HPF

## 2020-03-28 PROCEDURE — 87077 CULTURE AEROBIC IDENTIFY: CPT

## 2020-03-28 PROCEDURE — 81001 URINALYSIS AUTO W/SCOPE: CPT

## 2020-03-28 PROCEDURE — 87086 URINE CULTURE/COLONY COUNT: CPT

## 2020-03-28 PROCEDURE — 87186 SC STD MICRODIL/AGAR DIL: CPT

## 2020-03-29 RX ORDER — ATORVASTATIN CALCIUM 20 MG/1
TABLET, FILM COATED ORAL
Qty: 90 TABLET | Refills: 0 | Status: SHIPPED | OUTPATIENT
Start: 2020-03-29 | End: 2020-06-27

## 2020-03-29 RX ORDER — ATORVASTATIN CALCIUM 20 MG/1
TABLET, FILM COATED ORAL
Qty: 30 TABLET | Refills: 0 | Status: SHIPPED | OUTPATIENT
Start: 2020-03-29 | End: 2020-03-29

## 2020-04-01 ENCOUNTER — TELEPHONE (OUTPATIENT)
Dept: INTERNAL MEDICINE CLINIC | Facility: CLINIC | Age: 65
End: 2020-04-01

## 2020-04-01 ENCOUNTER — TELEPHONE (OUTPATIENT)
Dept: OTHER | Age: 65
End: 2020-04-01

## 2020-04-01 PROCEDURE — 99212 OFFICE O/P EST SF 10 MIN: CPT | Performed by: INTERNAL MEDICINE

## 2020-04-01 NOTE — TELEPHONE ENCOUNTER
Virtual/Telephone Check-In    Leilani Miller verbally consents to a Virtual/Telephone Check-In service on 04/01/20. Patient understands and accepts financial responsibility for any deductible, co-insurance and/or co-pays associated with this service.

## 2020-04-01 NOTE — TELEPHONE ENCOUNTER
Patient states she took Ciprofloxacin on Monday 1 tablet and started to feel shaky.   She took 2 doses yesterday and today has joint pain between left thumb and wrist.  She also states she has small,raised, clear bumps and itchiness on her left hand and arm

## 2020-04-09 RX ORDER — ALPRAZOLAM 0.25 MG/1
TABLET ORAL
Qty: 60 TABLET | Refills: 0 | Status: SHIPPED | OUTPATIENT
Start: 2020-04-09 | End: 2020-05-11

## 2020-04-13 RX ORDER — CEPHALEXIN 500 MG/1
CAPSULE ORAL
Qty: 20 CAPSULE | Refills: 0 | OUTPATIENT
Start: 2020-04-13

## 2020-04-30 RX ORDER — ZOLPIDEM TARTRATE 5 MG/1
TABLET ORAL
Qty: 30 TABLET | Refills: 0 | Status: SHIPPED | OUTPATIENT
Start: 2020-04-30 | End: 2020-07-09

## 2020-05-01 ENCOUNTER — VIRTUAL PHONE E/M (OUTPATIENT)
Dept: ORTHOPEDICS CLINIC | Facility: CLINIC | Age: 65
End: 2020-05-01

## 2020-05-01 DIAGNOSIS — M19.012 PRIMARY OSTEOARTHRITIS OF LEFT SHOULDER: Primary | ICD-10-CM

## 2020-05-01 PROCEDURE — 99212 OFFICE O/P EST SF 10 MIN: CPT | Performed by: ORTHOPAEDIC SURGERY

## 2020-05-01 RX ORDER — TRAMADOL HYDROCHLORIDE 50 MG/1
50 TABLET ORAL EVERY 6 HOURS PRN
Qty: 30 TABLET | Refills: 1 | Status: SHIPPED | OUTPATIENT
Start: 2020-05-01 | End: 2020-06-23

## 2020-05-01 NOTE — PROGRESS NOTES
Virtual Telephone Check-In    Shirley Zev verbally consents to a Virtual/Telephone Check-In visit on 05/01/20. Patient understands and accepts financial responsibility for any deductible, co-insurance and/or co-pays associated with this service. MRI is completed. She had an opportunity to ask questions. He voiced understanding with my instructions and recommendations. She understood that the telephone encounter was necessary due to the coronavirus pandemic.       Diagnoses and all orders for t

## 2020-05-07 ENCOUNTER — HOSPITAL ENCOUNTER (OUTPATIENT)
Dept: MRI IMAGING | Age: 65
Discharge: HOME | End: 2020-05-07
Attending: ORTHOPAEDIC SURGERY
Payer: COMMERCIAL

## 2020-05-07 DIAGNOSIS — M19.012 PRIMARY OSTEOARTHRITIS OF LEFT SHOULDER: ICD-10-CM

## 2020-05-07 PROCEDURE — 73221 MRI JOINT UPR EXTREM W/O DYE: CPT | Performed by: ORTHOPAEDIC SURGERY

## 2020-05-11 RX ORDER — ALPRAZOLAM 0.25 MG/1
TABLET ORAL
Qty: 60 TABLET | Refills: 0 | Status: SHIPPED | OUTPATIENT
Start: 2020-05-11 | End: 2020-06-13

## 2020-05-16 ENCOUNTER — TELEPHONE (OUTPATIENT)
Dept: INTERNAL MEDICINE CLINIC | Facility: CLINIC | Age: 65
End: 2020-05-16

## 2020-05-16 NOTE — TELEPHONE ENCOUNTER
Patient calling (verified name and ),requesting Dr Aga Parry to check the MRI results and interprets it to her. Strongly advised to call Dr Tam Harrison office regarding the results, specialist will discuss it to her,verbalized understanding.       Dr Morena Carr Post-Care Instructions: I reviewed with the patient in detail post-care instructions. Patient is to wear sunprotection, and avoid picking at any of the treated lesions. Pt may apply Vaseline to crusted or scabbing areas. Consent: The patient's consent was obtained including but not limited to risks of crusting, scabbing, blistering, scarring, darker or lighter pigmentary change, recurrence, incomplete removal and infection. Render Post-Care Instructions In Note?: yes Include Z78.9 (Other Specified Conditions Influencing Health Status) As An Associated Diagnosis?: No Detail Level: Detailed Medical Necessity Clause: This procedure was medically necessary because the lesions that were treated were: Number Of Freeze-Thaw Cycles: 2 freeze-thaw cycles Medical Necessity Information: It is in your best interest to select a reason for this procedure from the list below. All of these items fulfill various CMS LCD requirements except the new and changing color options.

## 2020-05-17 NOTE — TELEPHONE ENCOUNTER
MRI discusssed  Follow up with Dr Jose David Omalley  Patient voiced understanding  and agrees with plan

## 2020-05-20 ENCOUNTER — VIRTUAL PHONE E/M (OUTPATIENT)
Dept: ORTHOPEDICS CLINIC | Facility: CLINIC | Age: 65
End: 2020-05-20

## 2020-05-20 DIAGNOSIS — M19.012 PRIMARY OSTEOARTHRITIS OF LEFT SHOULDER: Primary | ICD-10-CM

## 2020-05-20 PROCEDURE — 99441 PHONE E/M BY PHYS 5-10 MIN: CPT | Performed by: ORTHOPAEDIC SURGERY

## 2020-05-20 NOTE — PROGRESS NOTES
Virtual Telephone Check-In    Brianne Ybarra verbally consents to a Virtual/Telephone Check-In visit on 05/20/20. Patient has been referred to the [facility] website at [email or web address] to review the yearly Consent to Treat document.     Patient unders necessary due to the coronavirus pandemic. She had an opportunity to ask questions. She voiced understanding with my instructions.     Diagnoses and all orders for this visit:    Primary osteoarthritis of left shoulder          Fritz Sequeira MD

## 2020-05-26 ENCOUNTER — TELEPHONE (OUTPATIENT)
Dept: ORTHOPEDICS CLINIC | Facility: CLINIC | Age: 65
End: 2020-05-26

## 2020-05-26 NOTE — TELEPHONE ENCOUNTER
Pt has not received a call to schedule surgery. Pt has been waiting for one week.   Please call to advise

## 2020-05-28 ENCOUNTER — PATIENT MESSAGE (OUTPATIENT)
Dept: ORTHOPEDICS CLINIC | Facility: CLINIC | Age: 65
End: 2020-05-28

## 2020-05-29 ENCOUNTER — TELEPHONE (OUTPATIENT)
Dept: ORTHOPEDICS CLINIC | Facility: CLINIC | Age: 65
End: 2020-05-29

## 2020-05-29 NOTE — TELEPHONE ENCOUNTER
From: Curtis See  Sent: 5/28/2020 5:10 PM CDT  To: [facility] Clinical Staff  Subject: RE:Pre Op Exam    Good Afternoon and thank you for your email. I would like to schedule the surgery as soon as possible. I realize there is a procedure to follow.  Is

## 2020-06-05 NOTE — TELEPHONE ENCOUNTER
S/w pt. Answered all questions and concerns. She had a questions about short term disability. Informed her that she should get forms from employer and bring it to the office so we can send it to our Forms Dept for completion.   She verbalized understandi

## 2020-06-12 ENCOUNTER — TELEPHONE (OUTPATIENT)
Dept: ORTHOPEDICS CLINIC | Facility: CLINIC | Age: 65
End: 2020-06-12

## 2020-06-12 NOTE — TELEPHONE ENCOUNTER
Pt calling states has been laid off of work as of 6/30 and wants to know can surgery be moved up because after the 30th she won't have any insurance  please advise

## 2020-06-12 NOTE — TELEPHONE ENCOUNTER
S/w pt and informed her I would need to discuss w/ Julia Mccoy to see if it possible to move up sx, and Jennifer will get back to her next wk. I did discuss with pt that she will need continued care after sx, and although her office visits with Dr. Carmela Suarez will be covered for 90 days PO other things like Xrays and PT will not be covered under the global. Pt states she was considering electing cobra for a period of time. She will look into this and be ready to discuss further once she hears from Jennifer next week.

## 2020-06-13 RX ORDER — ALPRAZOLAM 0.25 MG/1
TABLET ORAL
Qty: 60 TABLET | Refills: 0 | Status: SHIPPED | OUTPATIENT
Start: 2020-06-13 | End: 2020-07-16

## 2020-06-15 ENCOUNTER — TELEPHONE (OUTPATIENT)
Dept: INTERNAL MEDICINE CLINIC | Facility: CLINIC | Age: 65
End: 2020-06-15

## 2020-06-15 NOTE — TELEPHONE ENCOUNTER
Patient is scheduled to have shoulder replacement surgery on 7-13-20 and was told to see her pcp for a pre op clearance. Pt would like to see Dr. Chris Cabrera in the office by next week for clearance. There are no available appointments only virtual slots.      Please reply to pool: ZAIN Gibbons

## 2020-06-18 NOTE — TELEPHONE ENCOUNTER
S/w pt. Informed her that I spoke with Dr. Carlos Johns surgery scheduler. Unfortunately Dr. González Lopez is booked up with sx right now, and we are unable to move her date up. I provided her with the surgery cpt code so she can ask her insurance company to see what her coverage will be. She will try and find out the steps she will need to do to apply for cobra this morning. She will call back and will update me on her situation.

## 2020-06-19 ENCOUNTER — OFFICE VISIT (OUTPATIENT)
Dept: INTERNAL MEDICINE CLINIC | Facility: CLINIC | Age: 65
End: 2020-06-19

## 2020-06-19 ENCOUNTER — LAB ENCOUNTER (OUTPATIENT)
Dept: LAB | Age: 65
End: 2020-06-19
Attending: INTERNAL MEDICINE
Payer: COMMERCIAL

## 2020-06-19 ENCOUNTER — HOSPITAL ENCOUNTER (OUTPATIENT)
Dept: GENERAL RADIOLOGY | Age: 65
Discharge: HOME | End: 2020-06-19
Attending: INTERNAL MEDICINE
Payer: COMMERCIAL

## 2020-06-19 ENCOUNTER — APPOINTMENT (OUTPATIENT)
Dept: LAB | Age: 65
End: 2020-06-19
Attending: INTERNAL MEDICINE
Payer: COMMERCIAL

## 2020-06-19 VITALS
HEART RATE: 73 BPM | SYSTOLIC BLOOD PRESSURE: 120 MMHG | WEIGHT: 145 LBS | TEMPERATURE: 98 F | DIASTOLIC BLOOD PRESSURE: 76 MMHG | BODY MASS INDEX: 27.38 KG/M2 | HEIGHT: 61 IN

## 2020-06-19 DIAGNOSIS — E55.9 VITAMIN D DEFICIENCY: ICD-10-CM

## 2020-06-19 DIAGNOSIS — Z01.818 PREOP GENERAL PHYSICAL EXAM: ICD-10-CM

## 2020-06-19 DIAGNOSIS — I10 ESSENTIAL HYPERTENSION: ICD-10-CM

## 2020-06-19 DIAGNOSIS — E78.5 HYPERLIPIDEMIA, UNSPECIFIED HYPERLIPIDEMIA TYPE: ICD-10-CM

## 2020-06-19 DIAGNOSIS — Z01.818 PREOP GENERAL PHYSICAL EXAM: Primary | ICD-10-CM

## 2020-06-19 DIAGNOSIS — F41.1 ANXIETY STATE: ICD-10-CM

## 2020-06-19 DIAGNOSIS — Z01.818 OTHER SPECIFIED PRE-OPERATIVE EXAMINATION: Primary | ICD-10-CM

## 2020-06-19 PROCEDURE — 85025 COMPLETE CBC W/AUTO DIFF WBC: CPT

## 2020-06-19 PROCEDURE — 87641 MR-STAPH DNA AMP PROBE: CPT

## 2020-06-19 PROCEDURE — 80053 COMPREHEN METABOLIC PANEL: CPT

## 2020-06-19 PROCEDURE — 82306 VITAMIN D 25 HYDROXY: CPT

## 2020-06-19 PROCEDURE — 71046 X-RAY EXAM CHEST 2 VIEWS: CPT | Performed by: INTERNAL MEDICINE

## 2020-06-19 PROCEDURE — 81001 URINALYSIS AUTO W/SCOPE: CPT

## 2020-06-19 PROCEDURE — 85610 PROTHROMBIN TIME: CPT

## 2020-06-19 PROCEDURE — 99214 OFFICE O/P EST MOD 30 MIN: CPT | Performed by: INTERNAL MEDICINE

## 2020-06-19 PROCEDURE — 93005 ELECTROCARDIOGRAM TRACING: CPT

## 2020-06-19 PROCEDURE — 36415 COLL VENOUS BLD VENIPUNCTURE: CPT

## 2020-06-19 PROCEDURE — 85730 THROMBOPLASTIN TIME PARTIAL: CPT

## 2020-06-19 PROCEDURE — 93010 ELECTROCARDIOGRAM REPORT: CPT | Performed by: INTERNAL MEDICINE

## 2020-06-19 NOTE — PROGRESS NOTES
HPI:    Patient ID: Wale Dowd is a 62year old female.     HPI  Date: 7/13/2020 Time: 12:42 PM Status: Scheduled   Location: [address] [place] [address] MAIN OR Room: [facility] Service: Orthopedics       Surgeon Role   Oskar Chaudhry MD Primary    Procedure Laterality Anest palpitations and leg swelling. Gastrointestinal: Negative for abdominal pain, blood in stool, constipation, diarrhea, nausea and vomiting. Genitourinary: Negative for difficulty urinating, dysuria, frequency, hematuria and urgency.    Musculoskeletal: P • Other (Other) Maternal Grandmother    • Other (Other) Maternal Grandfather    • Other (Other) Paternal Grandmother    • Other (Other) Paternal Grandfather    • Hypertension Sister 67      Social History: Social History    Tobacco Use      Smoking statu 26.0 - 34.0 pg 29.5   MCHC      31.0 - 37.0 g/dL 33.5   RDW-SD      35.1 - 46.3 fL 40.3   RDW      11.0 - 15.0 % 12.8   Platelet Count      637.4 - 450.0 10(3)uL 333.0   Prelim Neutrophil Abs      1.50 - 7.70 x10 (3) uL 4.10   Neutrophils Absolute      1.5 Nitrite Urine      Negative Negative   Leukocyte Esterase Urine      Negative Trace (A)   SQUAM EPI CELLS UR      /HPF Few   WBC Urine      0 - 5 /HPF <1   RBC URINE      0 - 2 /HPF 0   Bacteria Urine      None Seen /HPF Negative   PT      11.8 - 14.5 se including EKG chest x-ray and urinalysis provided labs are within normal limits then patient is cleared to proceed with surgery as planned    Addendum:  EKG CXR and lab attached    Findings within normal   Patient is cleared to proceed with surgery as plan

## 2020-06-22 ENCOUNTER — TELEPHONE (OUTPATIENT)
Dept: ORTHOPEDICS CLINIC | Facility: CLINIC | Age: 65
End: 2020-06-22

## 2020-06-22 NOTE — TELEPHONE ENCOUNTER
S/w pt. She informed me that she will have COBRA insurance. Her insurance ID number and card will stay the same. We do not need to do anything else. I verbalized understanding.

## 2020-06-22 NOTE — TELEPHONE ENCOUNTER
Dr Sandy Mccoy, pharmacy requesting refill Tramadol for pt. Pt is scheduled for surgery with you on 07/13/20  Rx'd on 05/01/20 with # 30 and 1 RF.

## 2020-06-23 RX ORDER — TRAMADOL HYDROCHLORIDE 50 MG/1
TABLET ORAL
Qty: 30 TABLET | Refills: 0 | Status: ON HOLD | OUTPATIENT
Start: 2020-06-23 | End: 2020-07-14

## 2020-06-23 NOTE — TELEPHONE ENCOUNTER
Pt's  called stating pt has requested a refill tramadol from the pharmacy but was also advised to call the office. Pt is out of the medication. Please send to the pharm.  Call pt to

## 2020-06-27 RX ORDER — ATORVASTATIN CALCIUM 20 MG/1
TABLET, FILM COATED ORAL
Qty: 90 TABLET | Refills: 0 | Status: SHIPPED | OUTPATIENT
Start: 2020-06-27 | End: 2020-09-27

## 2020-07-01 ENCOUNTER — TELEPHONE (OUTPATIENT)
Dept: ORTHOPEDICS CLINIC | Facility: CLINIC | Age: 65
End: 2020-07-01

## 2020-07-01 NOTE — TELEPHONE ENCOUNTER
Pt has surgery scheduled with Dr. Abel Melgar on 7/13. Last seen Dr. Dayna Sky on 6/19. Labs and EKG have been completed. Is patient cleared for surgery?

## 2020-07-02 ENCOUNTER — MED REC SCAN ONLY (OUTPATIENT)
Dept: ORTHOPEDICS CLINIC | Facility: CLINIC | Age: 65
End: 2020-07-02

## 2020-07-09 RX ORDER — ZOLPIDEM TARTRATE 5 MG/1
TABLET ORAL
Qty: 30 TABLET | Refills: 0 | Status: SHIPPED | OUTPATIENT
Start: 2020-07-09 | End: 2020-08-14

## 2020-07-10 ENCOUNTER — LAB ENCOUNTER (OUTPATIENT)
Dept: LAB | Facility: HOSPITAL | Age: 65
End: 2020-07-10
Attending: ORTHOPAEDIC SURGERY
Payer: COMMERCIAL

## 2020-07-10 DIAGNOSIS — Z01.818 PREOP TESTING: ICD-10-CM

## 2020-07-10 LAB — SARS-COV-2 RNA RESP QL NAA+PROBE: NOT DETECTED

## 2020-07-10 NOTE — H&P
ORTHO SURGERY H&P  Evonne Collazo is a 62year old female. MRN is [id number]. CC: Left shoulder pain    HPI: 20-year-old female complaining of chronic progressively worsening left shoulder pain. Atraumatic in onset.   She describes clicking and grind Sexual Activity      Alcohol use: No        Comment: occ.       Drug use: No      Sexual activity: Not on file    Lifestyle      Physical activity:        Days per week: Not on file        Minutes per session: Not on file      Stress: Not on file    Relatio Medications   Medication Sig Dispense Refill   • ZOLPIDEM 5 MG Oral Tab TAKE 1 TABLET BY MOUTH EVERY NIGHT AT BEDTIME 30 tablet 0   • ATORVASTATIN 20 MG Oral Tab TAKE 1 TABLET BY MOUTH DAILY AT NIGHT 90 tablet 0   • TRAMADOL HCL 50 MG Oral Tab TAKE 1 TABLE 06/19/2020    BUN 15 06/19/2020    BUNCREA 21.4 (H) 06/19/2020    CREATSERUM 0.70 06/19/2020    ANIONGAP 6 06/19/2020    GFRNAA 93 06/19/2020    GFRAA 107 06/19/2020    CA 9.4 06/19/2020    OSMOCALC 291 06/19/2020    ALKPHO 44 (L) 06/19/2020    AST 14 (L) in the right lower chest.         CONCLUSION: Normal examination.      Dictated by (CST): Lane Tatum MD on 6/19/2020 at 3:34 PM     Finalized by (CST): Lane Tatum MD on 6/19/2020 at 3:36 PM          Previous left shoulder x-rays an

## 2020-07-13 ENCOUNTER — HOSPITAL ENCOUNTER (OUTPATIENT)
Facility: HOSPITAL | Age: 65
Setting detail: OBSERVATION
Discharge: HOME HEALTH | End: 2020-07-14
Attending: ORTHOPAEDIC SURGERY | Admitting: ORTHOPAEDIC SURGERY
Payer: COMMERCIAL

## 2020-07-13 ENCOUNTER — APPOINTMENT (OUTPATIENT)
Dept: GENERAL RADIOLOGY | Facility: HOSPITAL | Age: 65
End: 2020-07-13
Attending: ORTHOPAEDIC SURGERY
Payer: COMMERCIAL

## 2020-07-13 ENCOUNTER — ANESTHESIA (OUTPATIENT)
Dept: SURGERY | Facility: HOSPITAL | Age: 65
End: 2020-07-13
Payer: COMMERCIAL

## 2020-07-13 ENCOUNTER — ANESTHESIA EVENT (OUTPATIENT)
Dept: SURGERY | Facility: HOSPITAL | Age: 65
End: 2020-07-13
Payer: COMMERCIAL

## 2020-07-13 DIAGNOSIS — Z01.818 PREOP TESTING: Primary | ICD-10-CM

## 2020-07-13 PROBLEM — M19.012 PRIMARY OSTEOARTHRITIS OF LEFT SHOULDER: Status: ACTIVE | Noted: 2020-07-13

## 2020-07-13 PROBLEM — E78.5 HYPERLIPIDEMIA: Chronic | Status: ACTIVE | Noted: 2020-07-13

## 2020-07-13 PROCEDURE — 3008F BODY MASS INDEX DOCD: CPT | Performed by: HOSPITALIST

## 2020-07-13 PROCEDURE — 3078F DIAST BP <80 MM HG: CPT | Performed by: HOSPITALIST

## 2020-07-13 PROCEDURE — 73030 X-RAY EXAM OF SHOULDER: CPT | Performed by: ORTHOPAEDIC SURGERY

## 2020-07-13 PROCEDURE — 99225 SUBSEQUENT OBSERVATION CARE: CPT | Performed by: HOSPITALIST

## 2020-07-13 PROCEDURE — 3074F SYST BP LT 130 MM HG: CPT | Performed by: HOSPITALIST

## 2020-07-13 PROCEDURE — 3E0T3BZ INTRODUCTION OF ANESTHETIC AGENT INTO PERIPHERAL NERVES AND PLEXI, PERCUTANEOUS APPROACH: ICD-10-PCS | Performed by: ANESTHESIOLOGY

## 2020-07-13 PROCEDURE — 0RRK0JZ REPLACEMENT OF LEFT SHOULDER JOINT WITH SYNTHETIC SUBSTITUTE, OPEN APPROACH: ICD-10-PCS | Performed by: ORTHOPAEDIC SURGERY

## 2020-07-13 PROCEDURE — 76942 ECHO GUIDE FOR BIOPSY: CPT | Performed by: ANESTHESIOLOGY

## 2020-07-13 DEVICE — BIOMET BC R 1X40 US: Type: IMPLANTABLE DEVICE | Status: FUNCTIONAL

## 2020-07-13 RX ORDER — MORPHINE SULFATE 2 MG/ML
2 INJECTION, SOLUTION INTRAMUSCULAR; INTRAVENOUS EVERY 2 HOUR PRN
Status: DISCONTINUED | OUTPATIENT
Start: 2020-07-13 | End: 2020-07-14

## 2020-07-13 RX ORDER — TRANEXAMIC ACID 10 MG/ML
INJECTION, SOLUTION INTRAVENOUS AS NEEDED
Status: DISCONTINUED | OUTPATIENT
Start: 2020-07-13 | End: 2020-07-13 | Stop reason: SURG

## 2020-07-13 RX ORDER — METOCLOPRAMIDE HYDROCHLORIDE 5 MG/ML
10 INJECTION INTRAMUSCULAR; INTRAVENOUS EVERY 6 HOURS PRN
Status: DISCONTINUED | OUTPATIENT
Start: 2020-07-13 | End: 2020-07-14

## 2020-07-13 RX ORDER — OXYCODONE HYDROCHLORIDE 5 MG/1
10 TABLET ORAL EVERY 4 HOURS PRN
Status: DISCONTINUED | OUTPATIENT
Start: 2020-07-13 | End: 2020-07-14

## 2020-07-13 RX ORDER — MORPHINE SULFATE 4 MG/ML
2 INJECTION, SOLUTION INTRAMUSCULAR; INTRAVENOUS EVERY 10 MIN PRN
Status: DISCONTINUED | OUTPATIENT
Start: 2020-07-13 | End: 2020-07-13 | Stop reason: HOSPADM

## 2020-07-13 RX ORDER — PROCHLORPERAZINE EDISYLATE 5 MG/ML
5 INJECTION INTRAMUSCULAR; INTRAVENOUS ONCE AS NEEDED
Status: DISCONTINUED | OUTPATIENT
Start: 2020-07-13 | End: 2020-07-13 | Stop reason: HOSPADM

## 2020-07-13 RX ORDER — ONDANSETRON 2 MG/ML
INJECTION INTRAMUSCULAR; INTRAVENOUS AS NEEDED
Status: DISCONTINUED | OUTPATIENT
Start: 2020-07-13 | End: 2020-07-13 | Stop reason: SURG

## 2020-07-13 RX ORDER — HYDROCODONE BITARTRATE AND ACETAMINOPHEN 5; 325 MG/1; MG/1
1 TABLET ORAL AS NEEDED
Status: DISCONTINUED | OUTPATIENT
Start: 2020-07-13 | End: 2020-07-13 | Stop reason: HOSPADM

## 2020-07-13 RX ORDER — MORPHINE SULFATE 4 MG/ML
6 INJECTION, SOLUTION INTRAMUSCULAR; INTRAVENOUS EVERY 2 HOUR PRN
Status: DISCONTINUED | OUTPATIENT
Start: 2020-07-13 | End: 2020-07-14

## 2020-07-13 RX ORDER — DIPHENHYDRAMINE HYDROCHLORIDE 50 MG/ML
25 INJECTION INTRAMUSCULAR; INTRAVENOUS ONCE AS NEEDED
Status: ACTIVE | OUTPATIENT
Start: 2020-07-13 | End: 2020-07-13

## 2020-07-13 RX ORDER — ONDANSETRON 2 MG/ML
4 INJECTION INTRAMUSCULAR; INTRAVENOUS ONCE AS NEEDED
Status: DISCONTINUED | OUTPATIENT
Start: 2020-07-13 | End: 2020-07-13 | Stop reason: HOSPADM

## 2020-07-13 RX ORDER — DEXTROSE, SODIUM CHLORIDE, AND POTASSIUM CHLORIDE 5; .45; .15 G/100ML; G/100ML; G/100ML
INJECTION INTRAVENOUS CONTINUOUS
Status: DISCONTINUED | OUTPATIENT
Start: 2020-07-13 | End: 2020-07-14

## 2020-07-13 RX ORDER — ACETAMINOPHEN 500 MG
1000 TABLET ORAL ONCE
Status: DISCONTINUED | OUTPATIENT
Start: 2020-07-13 | End: 2020-07-13 | Stop reason: HOSPADM

## 2020-07-13 RX ORDER — LIDOCAINE HYDROCHLORIDE 10 MG/ML
INJECTION, SOLUTION INFILTRATION; PERINEURAL
Status: COMPLETED | OUTPATIENT
Start: 2020-07-13 | End: 2020-07-13

## 2020-07-13 RX ORDER — DEXAMETHASONE SODIUM PHOSPHATE 4 MG/ML
VIAL (ML) INJECTION AS NEEDED
Status: DISCONTINUED | OUTPATIENT
Start: 2020-07-13 | End: 2020-07-13 | Stop reason: SURG

## 2020-07-13 RX ORDER — CEFAZOLIN SODIUM/WATER 2 G/20 ML
2 SYRINGE (ML) INTRAVENOUS ONCE
Status: DISCONTINUED | OUTPATIENT
Start: 2020-07-13 | End: 2020-07-13 | Stop reason: HOSPADM

## 2020-07-13 RX ORDER — BISACODYL 10 MG
10 SUPPOSITORY, RECTAL RECTAL
Status: DISCONTINUED | OUTPATIENT
Start: 2020-07-13 | End: 2020-07-14

## 2020-07-13 RX ORDER — HYDROCODONE BITARTRATE AND ACETAMINOPHEN 5; 325 MG/1; MG/1
2 TABLET ORAL AS NEEDED
Status: DISCONTINUED | OUTPATIENT
Start: 2020-07-13 | End: 2020-07-13 | Stop reason: HOSPADM

## 2020-07-13 RX ORDER — HYDROMORPHONE HYDROCHLORIDE 1 MG/ML
0.4 INJECTION, SOLUTION INTRAMUSCULAR; INTRAVENOUS; SUBCUTANEOUS EVERY 5 MIN PRN
Status: DISCONTINUED | OUTPATIENT
Start: 2020-07-13 | End: 2020-07-13 | Stop reason: HOSPADM

## 2020-07-13 RX ORDER — METOCLOPRAMIDE 10 MG/1
10 TABLET ORAL ONCE
Status: DISCONTINUED | OUTPATIENT
Start: 2020-07-13 | End: 2020-07-13 | Stop reason: HOSPADM

## 2020-07-13 RX ORDER — MORPHINE SULFATE 4 MG/ML
4 INJECTION, SOLUTION INTRAMUSCULAR; INTRAVENOUS EVERY 2 HOUR PRN
Status: DISCONTINUED | OUTPATIENT
Start: 2020-07-13 | End: 2020-07-14

## 2020-07-13 RX ORDER — ROCURONIUM BROMIDE 10 MG/ML
INJECTION, SOLUTION INTRAVENOUS AS NEEDED
Status: DISCONTINUED | OUTPATIENT
Start: 2020-07-13 | End: 2020-07-13 | Stop reason: SURG

## 2020-07-13 RX ORDER — PROCHLORPERAZINE EDISYLATE 5 MG/ML
10 INJECTION INTRAMUSCULAR; INTRAVENOUS EVERY 6 HOURS PRN
Status: DISCONTINUED | OUTPATIENT
Start: 2020-07-13 | End: 2020-07-14

## 2020-07-13 RX ORDER — POLYETHYLENE GLYCOL 3350 17 G/17G
17 POWDER, FOR SOLUTION ORAL DAILY PRN
Status: DISCONTINUED | OUTPATIENT
Start: 2020-07-13 | End: 2020-07-14

## 2020-07-13 RX ORDER — MORPHINE SULFATE 15 MG/1
15 TABLET ORAL EVERY 4 HOURS PRN
Status: DISCONTINUED | OUTPATIENT
Start: 2020-07-13 | End: 2020-07-14

## 2020-07-13 RX ORDER — SODIUM CHLORIDE, SODIUM LACTATE, POTASSIUM CHLORIDE, CALCIUM CHLORIDE 600; 310; 30; 20 MG/100ML; MG/100ML; MG/100ML; MG/100ML
INJECTION, SOLUTION INTRAVENOUS CONTINUOUS
Status: DISCONTINUED | OUTPATIENT
Start: 2020-07-13 | End: 2020-07-14

## 2020-07-13 RX ORDER — ACETAMINOPHEN 500 MG
1000 TABLET ORAL ONCE
Status: DISCONTINUED | OUTPATIENT
Start: 2020-07-13 | End: 2020-07-13

## 2020-07-13 RX ORDER — PHENYLEPHRINE HCL 10 MG/ML
VIAL (ML) INJECTION AS NEEDED
Status: DISCONTINUED | OUTPATIENT
Start: 2020-07-13 | End: 2020-07-13 | Stop reason: SURG

## 2020-07-13 RX ORDER — MORPHINE SULFATE 2 MG/ML
1 INJECTION, SOLUTION INTRAMUSCULAR; INTRAVENOUS EVERY 2 HOUR PRN
Status: DISCONTINUED | OUTPATIENT
Start: 2020-07-13 | End: 2020-07-14

## 2020-07-13 RX ORDER — GABAPENTIN 100 MG/1
100 CAPSULE ORAL 2 TIMES DAILY
Status: DISCONTINUED | OUTPATIENT
Start: 2020-07-13 | End: 2020-07-14

## 2020-07-13 RX ORDER — HYDROCODONE BITARTRATE AND ACETAMINOPHEN 7.5; 325 MG/1; MG/1
1 TABLET ORAL EVERY 4 HOURS PRN
Status: DISCONTINUED | OUTPATIENT
Start: 2020-07-13 | End: 2020-07-14

## 2020-07-13 RX ORDER — ALPRAZOLAM 0.25 MG/1
0.25 TABLET ORAL 2 TIMES DAILY PRN
Status: DISCONTINUED | OUTPATIENT
Start: 2020-07-13 | End: 2020-07-14

## 2020-07-13 RX ORDER — OXYCODONE HCL 10 MG/1
10 TABLET, FILM COATED, EXTENDED RELEASE ORAL EVERY 12 HOURS
Status: DISCONTINUED | OUTPATIENT
Start: 2020-07-13 | End: 2020-07-14

## 2020-07-13 RX ORDER — MIDAZOLAM HYDROCHLORIDE 1 MG/ML
INJECTION INTRAMUSCULAR; INTRAVENOUS
Status: COMPLETED | OUTPATIENT
Start: 2020-07-13 | End: 2020-07-13

## 2020-07-13 RX ORDER — SODIUM PHOSPHATE, DIBASIC AND SODIUM PHOSPHATE, MONOBASIC 7; 19 G/133ML; G/133ML
1 ENEMA RECTAL ONCE AS NEEDED
Status: DISCONTINUED | OUTPATIENT
Start: 2020-07-13 | End: 2020-07-14

## 2020-07-13 RX ORDER — ACETAMINOPHEN 500 MG
1000 TABLET ORAL EVERY 8 HOURS
Status: COMPLETED | OUTPATIENT
Start: 2020-07-13 | End: 2020-07-14

## 2020-07-13 RX ORDER — OXYCODONE HYDROCHLORIDE 5 MG/1
5 TABLET ORAL EVERY 4 HOURS PRN
Status: DISCONTINUED | OUTPATIENT
Start: 2020-07-13 | End: 2020-07-14

## 2020-07-13 RX ORDER — NALOXONE HYDROCHLORIDE 0.4 MG/ML
80 INJECTION, SOLUTION INTRAMUSCULAR; INTRAVENOUS; SUBCUTANEOUS AS NEEDED
Status: DISCONTINUED | OUTPATIENT
Start: 2020-07-13 | End: 2020-07-13 | Stop reason: HOSPADM

## 2020-07-13 RX ORDER — FOSINOPRIL SODIUM AND HYDROCHLOROTHIAZIDE 20; 12.5 MG/1; MG/1
1 TABLET ORAL EVERY MORNING
Status: DISCONTINUED | OUTPATIENT
Start: 2020-07-14 | End: 2020-07-13

## 2020-07-13 RX ORDER — SODIUM CHLORIDE 0.9 % (FLUSH) 0.9 %
10 SYRINGE (ML) INJECTION AS NEEDED
Status: DISCONTINUED | OUTPATIENT
Start: 2020-07-13 | End: 2020-07-14

## 2020-07-13 RX ORDER — ZOLPIDEM TARTRATE 5 MG/1
5 TABLET ORAL NIGHTLY PRN
Status: DISCONTINUED | OUTPATIENT
Start: 2020-07-13 | End: 2020-07-14

## 2020-07-13 RX ORDER — ROPIVACAINE HYDROCHLORIDE 5 MG/ML
INJECTION, SOLUTION EPIDURAL; INFILTRATION; PERINEURAL
Status: COMPLETED | OUTPATIENT
Start: 2020-07-13 | End: 2020-07-13

## 2020-07-13 RX ORDER — GLYCOPYRROLATE 0.2 MG/ML
INJECTION, SOLUTION INTRAMUSCULAR; INTRAVENOUS AS NEEDED
Status: DISCONTINUED | OUTPATIENT
Start: 2020-07-13 | End: 2020-07-13 | Stop reason: SURG

## 2020-07-13 RX ORDER — SCOLOPAMINE TRANSDERMAL SYSTEM 1 MG/1
1 PATCH, EXTENDED RELEASE TRANSDERMAL ONCE
Status: DISCONTINUED | OUTPATIENT
Start: 2020-07-13 | End: 2020-07-14

## 2020-07-13 RX ORDER — HYDROMORPHONE HYDROCHLORIDE 1 MG/ML
0.6 INJECTION, SOLUTION INTRAMUSCULAR; INTRAVENOUS; SUBCUTANEOUS EVERY 5 MIN PRN
Status: DISCONTINUED | OUTPATIENT
Start: 2020-07-13 | End: 2020-07-13 | Stop reason: HOSPADM

## 2020-07-13 RX ORDER — ATORVASTATIN CALCIUM 20 MG/1
20 TABLET, FILM COATED ORAL NIGHTLY
Status: DISCONTINUED | OUTPATIENT
Start: 2020-07-13 | End: 2020-07-14

## 2020-07-13 RX ORDER — MORPHINE SULFATE 4 MG/ML
4 INJECTION, SOLUTION INTRAMUSCULAR; INTRAVENOUS EVERY 10 MIN PRN
Status: DISCONTINUED | OUTPATIENT
Start: 2020-07-13 | End: 2020-07-13 | Stop reason: HOSPADM

## 2020-07-13 RX ORDER — FAMOTIDINE 20 MG/1
20 TABLET ORAL ONCE
Status: DISCONTINUED | OUTPATIENT
Start: 2020-07-13 | End: 2020-07-13 | Stop reason: HOSPADM

## 2020-07-13 RX ORDER — HALOPERIDOL 5 MG/ML
0.25 INJECTION INTRAMUSCULAR ONCE AS NEEDED
Status: DISCONTINUED | OUTPATIENT
Start: 2020-07-13 | End: 2020-07-13 | Stop reason: HOSPADM

## 2020-07-13 RX ORDER — CEFAZOLIN SODIUM/WATER 2 G/20 ML
2 SYRINGE (ML) INTRAVENOUS EVERY 8 HOURS
Status: COMPLETED | OUTPATIENT
Start: 2020-07-13 | End: 2020-07-14

## 2020-07-13 RX ORDER — GABAPENTIN 600 MG/1
600 TABLET ORAL ONCE
Status: COMPLETED | OUTPATIENT
Start: 2020-07-13 | End: 2020-07-13

## 2020-07-13 RX ORDER — OXYCODONE HCL 10 MG/1
10 TABLET, FILM COATED, EXTENDED RELEASE ORAL ONCE
Status: COMPLETED | OUTPATIENT
Start: 2020-07-13 | End: 2020-07-13

## 2020-07-13 RX ORDER — MORPHINE SULFATE 10 MG/ML
6 INJECTION, SOLUTION INTRAMUSCULAR; INTRAVENOUS EVERY 10 MIN PRN
Status: DISCONTINUED | OUTPATIENT
Start: 2020-07-13 | End: 2020-07-13 | Stop reason: HOSPADM

## 2020-07-13 RX ORDER — TRANEXAMIC ACID 10 MG/ML
1000 INJECTION, SOLUTION INTRAVENOUS ONCE
Status: DISCONTINUED | OUTPATIENT
Start: 2020-07-13 | End: 2020-07-13 | Stop reason: HOSPADM

## 2020-07-13 RX ORDER — HYDROMORPHONE HYDROCHLORIDE 1 MG/ML
0.2 INJECTION, SOLUTION INTRAMUSCULAR; INTRAVENOUS; SUBCUTANEOUS EVERY 5 MIN PRN
Status: DISCONTINUED | OUTPATIENT
Start: 2020-07-13 | End: 2020-07-13 | Stop reason: HOSPADM

## 2020-07-13 RX ORDER — DOCUSATE SODIUM 100 MG/1
100 CAPSULE, LIQUID FILLED ORAL 2 TIMES DAILY
Status: DISCONTINUED | OUTPATIENT
Start: 2020-07-13 | End: 2020-07-14

## 2020-07-13 RX ORDER — CELECOXIB 200 MG/1
200 CAPSULE ORAL ONCE
Status: COMPLETED | OUTPATIENT
Start: 2020-07-13 | End: 2020-07-13

## 2020-07-13 RX ORDER — ONDANSETRON 2 MG/ML
4 INJECTION INTRAMUSCULAR; INTRAVENOUS EVERY 4 HOURS PRN
Status: DISCONTINUED | OUTPATIENT
Start: 2020-07-13 | End: 2020-07-14

## 2020-07-13 RX ORDER — CEFAZOLIN SODIUM/WATER 2 G/20 ML
SYRINGE (ML) INTRAVENOUS AS NEEDED
Status: DISCONTINUED | OUTPATIENT
Start: 2020-07-13 | End: 2020-07-13 | Stop reason: SURG

## 2020-07-13 RX ORDER — SODIUM CHLORIDE, SODIUM LACTATE, POTASSIUM CHLORIDE, CALCIUM CHLORIDE 600; 310; 30; 20 MG/100ML; MG/100ML; MG/100ML; MG/100ML
INJECTION, SOLUTION INTRAVENOUS CONTINUOUS
Status: DISCONTINUED | OUTPATIENT
Start: 2020-07-13 | End: 2020-07-13 | Stop reason: HOSPADM

## 2020-07-13 RX ADMIN — PHENYLEPHRINE HCL 100 MCG: 10 MG/ML VIAL (ML) INJECTION at 13:44:00

## 2020-07-13 RX ADMIN — TRANEXAMIC ACID 1000 MG: 10 INJECTION, SOLUTION INTRAVENOUS at 12:55:00

## 2020-07-13 RX ADMIN — MIDAZOLAM HYDROCHLORIDE 2 MG: 1 INJECTION INTRAMUSCULAR; INTRAVENOUS at 11:14:00

## 2020-07-13 RX ADMIN — SODIUM CHLORIDE, SODIUM LACTATE, POTASSIUM CHLORIDE, CALCIUM CHLORIDE: 600; 310; 30; 20 INJECTION, SOLUTION INTRAVENOUS at 14:02:00

## 2020-07-13 RX ADMIN — PHENYLEPHRINE HCL 100 MCG: 10 MG/ML VIAL (ML) INJECTION at 14:12:00

## 2020-07-13 RX ADMIN — SODIUM CHLORIDE, SODIUM LACTATE, POTASSIUM CHLORIDE, CALCIUM CHLORIDE: 600; 310; 30; 20 INJECTION, SOLUTION INTRAVENOUS at 12:37:00

## 2020-07-13 RX ADMIN — ROCURONIUM BROMIDE 5 MG: 10 INJECTION, SOLUTION INTRAVENOUS at 12:43:00

## 2020-07-13 RX ADMIN — DEXAMETHASONE SODIUM PHOSPHATE 4 MG: 4 MG/ML VIAL (ML) INJECTION at 12:56:00

## 2020-07-13 RX ADMIN — ROPIVACAINE HYDROCHLORIDE 30 ML: 5 INJECTION, SOLUTION EPIDURAL; INFILTRATION; PERINEURAL at 11:14:00

## 2020-07-13 RX ADMIN — ONDANSETRON 4 MG: 2 INJECTION INTRAMUSCULAR; INTRAVENOUS at 12:56:00

## 2020-07-13 RX ADMIN — LIDOCAINE HYDROCHLORIDE 1 ML: 10 INJECTION, SOLUTION INFILTRATION; PERINEURAL at 11:14:00

## 2020-07-13 RX ADMIN — PHENYLEPHRINE HCL 100 MCG: 10 MG/ML VIAL (ML) INJECTION at 13:50:00

## 2020-07-13 RX ADMIN — PHENYLEPHRINE HCL 100 MCG: 10 MG/ML VIAL (ML) INJECTION at 13:01:00

## 2020-07-13 RX ADMIN — GLYCOPYRROLATE 0.1 MG: 0.2 INJECTION, SOLUTION INTRAMUSCULAR; INTRAVENOUS at 12:43:00

## 2020-07-13 RX ADMIN — PHENYLEPHRINE HCL 100 MCG: 10 MG/ML VIAL (ML) INJECTION at 13:59:00

## 2020-07-13 RX ADMIN — CEFAZOLIN SODIUM/WATER 2 G: 2 G/20 ML SYRINGE (ML) INTRAVENOUS at 12:54:00

## 2020-07-13 NOTE — ANESTHESIA PROCEDURE NOTES
Peripheral Block  Date/Time: 7/13/2020 11:14 AM  Performed by: Masoud Roberts MD  Authorized by: Masoud Roberts MD       General Information and Staff    Start Time:  7/13/2020 11:14 AM  End Time:  7/13/2020 11:20 AM  Anesthesiologist:  Masoud Roberts

## 2020-07-13 NOTE — ANESTHESIA PROCEDURE NOTES
Airway  Date/Time: 7/13/2020 12:45 PM  Urgency: Elective    Airway not difficult    General Information and Staff    Patient location during procedure: OR  Anesthesiologist: Harlan Mas MD  Resident/CRNA: Madi Oliva, CRNA  Performed: CRNA

## 2020-07-13 NOTE — ANESTHESIA PREPROCEDURE EVALUATION
Anesthesia PreOp Note    HPI:     Marci Chang is a 62year old female who presents for preoperative consultation requested by: Marcela Barrera MD    Date of Surgery: 7/13/2020    Procedure(s):  SHOULDER TOTAL  Indication: left shoulder glenohumeral ALPRAZOLAM 0.25 MG Oral Tab, TAKE 1 TABLET BY MOUTH TWICE DAILY, Disp: 60 tablet, Rfl: 0, 7/13/2020 at 1000  FOSINOPRIL SODIUM-HCTZ 20-12.5 MG Oral Tab, TAKE 1 TABLET BY MOUTH ONCE DAILY. (Patient taking differently: Take 1 tablet by mouth every morning. Smoking status: Former Smoker        Types: Cigarettes      Smokeless tobacco: Former User      Tobacco comment: Quit 10 years     Substance and Sexual Activity      Alcohol use: No        Comment: occ.       Drug use: No      Sexual activity: Not on fi Component Value Date     06/19/2020    K 3.4 (L) 06/19/2020     06/19/2020    CO2 26.0 06/19/2020    BUN 15 06/19/2020    CREATSERUM 0.70 06/19/2020    GLU 94 06/19/2020    CA 9.4 06/19/2020          Vital Signs:   Body mass index is 26.83 kg/m²

## 2020-07-13 NOTE — ANESTHESIA POSTPROCEDURE EVALUATION
Patient: Chris Hardy    Procedure Summary     Date:  07/13/20 Room / Location:  [address] [place] [address] MAIN OR 06 / [address] [place] [address] OR    Anesthesia Start:  4419 Anesthesia Stop:  4015    Procedure:  SHOULDER TOTAL (Left ) Diagnosis:  (left shoulder glenohumeral osteoarthritis

## 2020-07-13 NOTE — PROGRESS NOTES
[facility] HOSP - [facility]    Progress Note    Minus Prow Patient Status:  Hospital Outpatient Surgery    1955 MRN [id number]   Location [address] UNIT Attending Tono Pacheco MD   Hosp Day # 0 KARLOS Thayer DVT PROPHYLAXIS, PT/OT. Anxiety state  CONT HOME MEDS. Essential hypertension  CONT HOME MEDS, MONITOR. Hyperlipidemia  CONT HOME MEDS.              Results:     Lab Results   Component Value Date    WBC 6.8 06/19/2020    HGB 12.0 06/19/20

## 2020-07-13 NOTE — OPERATIVE REPORT
Operative Note    Patient Name: Hilda Johnson    Preoperative Diagnosis: left shoulder glenohumeral osteoarthritis    Postoperative Diagnosis: left shoulder glenohumeral osteoarthritis    Primary Surgeon: Loida Dolan MD     Assistant: Antony Wallace, [facility]

## 2020-07-14 VITALS
RESPIRATION RATE: 18 BRPM | DIASTOLIC BLOOD PRESSURE: 69 MMHG | TEMPERATURE: 98 F | OXYGEN SATURATION: 96 % | HEIGHT: 61 IN | BODY MASS INDEX: 26.81 KG/M2 | WEIGHT: 142 LBS | HEART RATE: 62 BPM | SYSTOLIC BLOOD PRESSURE: 104 MMHG

## 2020-07-14 PROCEDURE — 99217 OBSERVATION CARE DISCHARGE: CPT | Performed by: HOSPITALIST

## 2020-07-14 RX ORDER — HYDROCODONE BITARTRATE AND ACETAMINOPHEN 7.5; 325 MG/1; MG/1
1 TABLET ORAL EVERY 4 HOURS PRN
Qty: 20 TABLET | Refills: 0 | Status: SHIPPED | OUTPATIENT
Start: 2020-07-14 | End: 2020-08-13 | Stop reason: ALTCHOICE

## 2020-07-14 RX ORDER — POLYETHYLENE GLYCOL 3350 17 G/17G
17 POWDER, FOR SOLUTION ORAL DAILY PRN
Qty: 170 G | Refills: 0 | Status: SHIPPED | OUTPATIENT
Start: 2020-07-14 | End: 2020-08-13 | Stop reason: ALTCHOICE

## 2020-07-14 RX ORDER — GABAPENTIN 100 MG/1
100 CAPSULE ORAL 2 TIMES DAILY
Qty: 4 CAPSULE | Refills: 0 | Status: SHIPPED | OUTPATIENT
Start: 2020-07-14 | End: 2020-08-13 | Stop reason: ALTCHOICE

## 2020-07-14 RX ORDER — OXYCODONE HCL 10 MG/1
10 TABLET, FILM COATED, EXTENDED RELEASE ORAL EVERY 12 HOURS
Qty: 4 TABLET | Refills: 0 | Status: SHIPPED | OUTPATIENT
Start: 2020-07-14 | End: 2020-08-13 | Stop reason: ALTCHOICE

## 2020-07-14 NOTE — PLAN OF CARE
Problem: Patient Centered Care  Goal: Patient preferences are identified and integrated in the patient's plan of care  Description  Interventions:  - What would you like us to know as we care for you?  I am not used to being in the hospital   - Provide ti during anticipated neutropenic period  Description  INTERVENTIONS  - Monitor WBC  - Administer growth factors as ordered  - Implement neutropenic guidelines  Outcome: Progressing     Problem: SAFETY ADULT - FALL  Goal: Free from fall injury  Description  I drainage per collection chamber. Sling to left shoulder, as well as polar ice. Pain being managed with scheduled Oxycodone ER and Extra Strength Tylenol.  Pain management regimen including scheduled and PRN medications reviewed with patient and written out

## 2020-07-14 NOTE — OCCUPATIONAL THERAPY NOTE
OCCUPATIONAL THERAPY EVALUATION - INPATIENT     Room Number: 434/434-A  Evaluation Date: 7/14/2020  Type of Evaluation: Initial  Presenting Problem: (L tsa)    Physician Order: IP Consult to Occupational Therapy  Reason for Therapy: ADL/IADL Dysfunction at this time. Pt is to receive OP therapy after MD follow up visit    DISCHARGE RECOMMENDATIONS  OT Discharge Recommendations: Home; Intermittent Supervision  OT Device Recommendations: Shower chair    PLAN    Patient has been evaluated and presents with no for so long.  I'm glad it's over\"    OCCUPATIONAL THERAPY EXAMINATION      OBJECTIVE  Precautions: Limb alert - left(sling at all times)  Fall Risk: Standard fall risk    WEIGHT BEARING RESTRICTION  L Upper Extremity: Non-Weight Bearing    PAIN ASSESSMENT management reviewed along w/ supportive positioning in bed and chair  Patient End of Session: Up in chair;Needs met;Call light within reach;RN aware of session/findings; All patient questions and concerns addressed; Family present

## 2020-07-14 NOTE — PLAN OF CARE
Pt alert and oriented. Pt still having some numbness/tingling s/p block but pt states it seems to be improving and she is slowly gaining ability to move her arm and hand more. Immobilizer sling in place. Room air. SCD's. Voiding.  Up with 1 assist. Therapy during anticipated neutropenic period  Description  INTERVENTIONS  - Monitor WBC  - Administer growth factors as ordered  - Implement neutropenic guidelines  Outcome: Progressing     Problem: SAFETY ADULT - FALL  Goal: Free from fall injury  Description  I

## 2020-07-14 NOTE — CM/SW NOTE
SW was notified of the need for Jocelyn 79. Referral made to [facility] as they are contracted with the pt's insurance. Per Prerna Almanza RN with [facility] they are unable to accept the pt. Due to staffing.   [facility] re[facility]referred and [facility] is abl

## 2020-07-14 NOTE — ANESTHESIA POST-OP FOLLOW-UP NOTE
Very adequate ISB for L TSA.   Pain still not bad, moving fingers, no neck hematoma, getting ready to go home

## 2020-07-14 NOTE — OPERATIVE REPORT
[facility]    PATIENT'S NAME: Judi Merritt   ATTENDING PHYSICIAN: Fanny Patel MD   OPERATING PHYSICIAN: Jeremy Burrell MD   PATIENT ACCOUNT#:   [id number]    LOCATION:  4[facility] #:   [id number]       DATE OF ALFONSO extremities were flexed at the hip and knees and SCD devices were placed on both lower extremities. The left shoulder and upper extremity were then prepped and draped in a sterile fashion.   The patient was given preoperative IV antibiotics and IV tranexam humerus posterior to the glenoid with the [facility] retractor. The glenoid labrum was excised as well as the remnant of the biceps intra-articular portion. We then used the glenoid guide to find the center put on the glenoid.   We used a curved awl to place

## 2020-07-14 NOTE — PROGRESS NOTES
Subjective: No complaints. Pain controlled.     Objective:    Patient Vitals for the past 24 hrs:   BP Temp Temp src Pulse Resp SpO2 Weight   07/14/20 0812 104/69 98.1 °F (36.7 °C) Oral 62 18 96 % —   07/14/20 0600 101/58 98.3 °F (36.8 °C) Oral 57 18 95 % — lower/upper extremity compartments are supple and non-tender.    Lab Results   Component Value Date    WBC 6.8 06/19/2020    RBC 4.07 06/19/2020    HGB 12.0 06/19/2020    MCV 88.0 06/19/2020    MCH 29.5 06/19/2020    MCHC 33.5 06/19/2020    RDW 12.8 06/19/2

## 2020-07-14 NOTE — HOME CARE LIAISON
Addendum:  [facility] accepted patient and reserved in [address]. Nir Beckman made aware. Received referral from Nir Beckman for [facility] services on discharge. [facility] is unable to accept this patient at this time due to staffing.  Multiple re-referrals se

## 2020-07-14 NOTE — PLAN OF CARE
Problem: PAIN - ADULT  Goal: Verbalizes/displays adequate comfort level or patient's stated pain goal  Description  INTERVENTIONS:  - Encourage pt to monitor pain and request assistance  - Assess pain using appropriate pain scale  - Administer analgesics resources and transportation as appropriate  - Identify discharge learning needs (meds, wound care, etc)  - Arrange for interpreters to assist at discharge as needed  - Consider post-discharge preferences of patient/family/discharge partner  - Complete HAILE

## 2020-07-16 RX ORDER — ALPRAZOLAM 0.25 MG/1
TABLET ORAL
Qty: 60 TABLET | Refills: 0 | Status: SHIPPED | OUTPATIENT
Start: 2020-07-16 | End: 2020-08-14

## 2020-07-16 NOTE — DISCHARGE SUMMARY
[facility]    PATIENT'S NAME: Everton Tolliver PHYSICIAN: Liliana Talavera MD   PATIENT ACCOUNT#:   [id number]    LOCATION:  [address] [place] [address]. [facility] [address] #:   [id number]       YOB: 1955  ADMISSION DATE:       07/1 and sling. DISPOSITION:  At this time, the patient is deemed stable to be discharged home.     Dictated By Mary Soriano MD  d: 07/14/2020 10:32:19  t: 07/16/2020 09:03:49  [facility] [id number]  [facility]/

## 2020-07-20 ENCOUNTER — TELEPHONE (OUTPATIENT)
Dept: ORTHOPEDICS CLINIC | Facility: CLINIC | Age: 65
End: 2020-07-20

## 2020-07-20 NOTE — TELEPHONE ENCOUNTER
Spouse calling states medication listed below is not helping only last 2-3 hours states needs something else for pain.  Pt is having headaches doesn't know if it's from the headaches or not please advise

## 2020-07-23 ENCOUNTER — OFFICE VISIT (OUTPATIENT)
Dept: ORTHOPEDICS CLINIC | Facility: CLINIC | Age: 65
End: 2020-07-23

## 2020-07-23 ENCOUNTER — HOSPITAL ENCOUNTER (OUTPATIENT)
Dept: GENERAL RADIOLOGY | Facility: HOSPITAL | Age: 65
Discharge: HOME | End: 2020-07-23
Attending: ORTHOPAEDIC SURGERY
Payer: COMMERCIAL

## 2020-07-23 DIAGNOSIS — Z47.89 ORTHOPEDIC AFTERCARE: ICD-10-CM

## 2020-07-23 DIAGNOSIS — M19.012 PRIMARY OSTEOARTHRITIS OF LEFT SHOULDER: Primary | ICD-10-CM

## 2020-07-23 PROCEDURE — 1111F DSCHRG MED/CURRENT MED MERGE: CPT | Performed by: ORTHOPAEDIC SURGERY

## 2020-07-23 PROCEDURE — A4565 SLINGS: HCPCS | Performed by: ORTHOPAEDIC SURGERY

## 2020-07-23 PROCEDURE — 73030 X-RAY EXAM OF SHOULDER: CPT | Performed by: ORTHOPAEDIC SURGERY

## 2020-07-23 PROCEDURE — 99024 POSTOP FOLLOW-UP VISIT: CPT | Performed by: ORTHOPAEDIC SURGERY

## 2020-07-23 NOTE — PROGRESS NOTES
NURSING INTAKE COMMENTS: Patient presents with:  Post-Op: Left TSA - 1st visit - had sx on 7/13/2020 - states she is very well - has pain rated as 3-7/10 on and off - no numbness or tingling       HPI: This 62year old female presents today with complaints by mouth 2 (two) times daily. 4 capsule 0   • oxyCODONE HCl ER 10 MG Oral Tablet Extended Release 12 hour Abuse-Deterrent Take 1 tablet (10 mg total) by mouth every 12 (twelve) hours.  4 tablet 0   • PEG 3350 17 g Oral Powd Pack Take 17 g by mouth daily as urinating  MUSCULOSKELETAL: denies musculoskeletal complaints other than in HPI  NEURO: denies numbness, tingling, weakness, balance issues, dizziness, memory loss  PSYCHIATRIC: denies Hx of depression, anxiety, other psychiatric disorders  HEMATOLOGIC: de at 10:41 AM     Finalized by (CST): Sapphire Felton MD on 7/23/2020 at 10:43 AM          Xr Shoulder, Complete (min 2 Views), Left (cpt=73030)    Result Date: 7/13/2020  PROCEDURE: XR SHOULDER, COMPLETE (MIN 2 VIEWS), LEFT (CPT=73030)  COMPARISON: Blade SELBY tolerated. I recommend home therapy for the next week and then transitioning to outpatient physical therapy in 1 week. We provided a new sling as her current foam sling was uncomfortable and hot. Follow-up in 3 weeks.   No x-rays are necessary at that vi

## 2020-07-27 RX ORDER — FOSINOPRIL SODIUM AND HYDROCHLOROTHIAZIDE 20; 12.5 MG/1; MG/1
TABLET ORAL
Qty: 90 TABLET | Refills: 1 | Status: SHIPPED | OUTPATIENT
Start: 2020-07-27 | End: 2021-03-01

## 2020-08-05 ENCOUNTER — MED REC SCAN ONLY (OUTPATIENT)
Dept: INTERNAL MEDICINE CLINIC | Facility: CLINIC | Age: 65
End: 2020-08-05

## 2020-08-12 ENCOUNTER — OFFICE VISIT (OUTPATIENT)
Dept: PHYSICAL THERAPY | Age: 65
End: 2020-08-12
Attending: ORTHOPAEDIC SURGERY
Payer: COMMERCIAL

## 2020-08-12 DIAGNOSIS — M19.012 PRIMARY OSTEOARTHRITIS OF LEFT SHOULDER: ICD-10-CM

## 2020-08-12 PROCEDURE — 97162 PT EVAL MOD COMPLEX 30 MIN: CPT | Performed by: PHYSICAL THERAPIST

## 2020-08-12 PROCEDURE — 97110 THERAPEUTIC EXERCISES: CPT | Performed by: PHYSICAL THERAPIST

## 2020-08-12 NOTE — PROGRESS NOTES
POST-OP SHOULDER EVALUATION:   Referring Physician: Dr. Padmini Wharton  Diagnosis: L TSA     Date of Service: 8/12/2020     PATIENT Bruce Yo is a 62year old female who presents to therapy today s/p L TSA on 7/13/2020  ( 4 weeks 2 days), 2 wee pain)  Shoulder  Elbow   NT in standing.   In supine able to raise to 90 deg WNL     PROM: (* denotes performed with pain)  Shoulder  Elbow   Flexion: R ; L 90  Abduction: R ; L 60 deg in scapular plane  ER: R 90; L 20  IR: R 75 ; L 50 WNL         Strength/ increased mid/low trap strength to 4/5 to promote improved shoulder mechanics and stabilization with ADL such as lifting and reaching   · Pt will be independent and compliant with comprehensive HEP to maintain progress achieved in PT     Ramon / Jeff Jordan

## 2020-08-13 ENCOUNTER — OFFICE VISIT (OUTPATIENT)
Dept: ORTHOPEDICS CLINIC | Facility: CLINIC | Age: 65
End: 2020-08-13

## 2020-08-13 DIAGNOSIS — M19.012 PRIMARY OSTEOARTHRITIS OF LEFT SHOULDER: Primary | ICD-10-CM

## 2020-08-13 PROCEDURE — 99024 POSTOP FOLLOW-UP VISIT: CPT | Performed by: ORTHOPAEDIC SURGERY

## 2020-08-13 NOTE — PROGRESS NOTES
NURSING INTAKE COMMENTS: Patient presents with:  Post-Op: Post op visit from left total shoulder arthroplasty. Denies any pain in the shoulder. Notices some tenderness on her left forearm. Started PT at [address] [place] [address] yesterday.       HPI: This 62year old fema Allergies  Family History   Problem Relation Age of Onset   • Diabetes Sister    • Hypertension Father    • Heart Disorder Mother    • Cancer Mother    • Lung Disorder Mother    • Other (Other) Maternal Grandmother    • Other (Other) Maternal Grandfather active forward flexion is to 60 degrees. Passive flexion to 90 degrees. External rotation active and passive is to 30 degrees. Internal rotation passively is to 60 degrees. Abduction strength to 5. External rotation strength 4-5.   Vision is well-heale Assessment and Plan:  Diagnoses and all orders for this visit:    Primary osteoarthritis of left shoulder        Assessment: Left total shoulder arthroplasty. Plan:  The forearm discomfort she is having is likely related to dependent ecchymosis from

## 2020-08-14 ENCOUNTER — TELEPHONE (OUTPATIENT)
Dept: INTERNAL MEDICINE CLINIC | Facility: CLINIC | Age: 65
End: 2020-08-14

## 2020-08-14 RX ORDER — ZOLPIDEM TARTRATE 5 MG/1
TABLET ORAL
Qty: 30 TABLET | Refills: 0 | Status: SHIPPED | OUTPATIENT
Start: 2020-08-14 | End: 2020-09-13

## 2020-08-14 RX ORDER — ALPRAZOLAM 0.25 MG/1
TABLET ORAL
Qty: 60 TABLET | Refills: 0 | Status: SHIPPED | OUTPATIENT
Start: 2020-08-14 | End: 2020-09-13

## 2020-08-17 ENCOUNTER — TELEPHONE (OUTPATIENT)
Dept: INTERNAL MEDICINE CLINIC | Facility: CLINIC | Age: 65
End: 2020-08-17

## 2020-08-17 ENCOUNTER — OFFICE VISIT (OUTPATIENT)
Dept: PHYSICAL THERAPY | Age: 65
End: 2020-08-17
Attending: ORTHOPAEDIC SURGERY
Payer: COMMERCIAL

## 2020-08-17 PROCEDURE — 97110 THERAPEUTIC EXERCISES: CPT | Performed by: PHYSICAL THERAPIST

## 2020-08-17 PROCEDURE — 97140 MANUAL THERAPY 1/> REGIONS: CPT | Performed by: PHYSICAL THERAPIST

## 2020-08-17 NOTE — PROGRESS NOTES
Dx: L Total shoulder         Insurance (Authorized # of Visits):  2/12           Authorizing Physician: Dr. Cris Salinas  Next MD visit: none scheduled  Fall Risk: standard         Precautions: n/a             Subjective: Patient reports no pain, continued the

## 2020-08-17 NOTE — TELEPHONE ENCOUNTER
[facility] is following up with orders for physical therapy  that were faxed on 08/04 and 8/06.

## 2020-08-19 ENCOUNTER — OFFICE VISIT (OUTPATIENT)
Dept: PHYSICAL THERAPY | Age: 65
End: 2020-08-19
Attending: ORTHOPAEDIC SURGERY
Payer: COMMERCIAL

## 2020-08-19 PROCEDURE — 97110 THERAPEUTIC EXERCISES: CPT | Performed by: PHYSICAL THERAPIST

## 2020-08-19 PROCEDURE — 97140 MANUAL THERAPY 1/> REGIONS: CPT | Performed by: PHYSICAL THERAPIST

## 2020-08-19 NOTE — PROGRESS NOTES
Dx: L Total shoulder         Insurance (Authorized # of Visits):  3/12           Authorizing Physician: Dr. Jaymie Shah MD visit: none scheduled  Fall Risk: standard         Precautions: n/a        Surgery 5 weeks , 2 days.      Subjective: Patient repor 2       Total Timed Treatment: 45 min  Total Treatment Time: 45 min

## 2020-08-21 ENCOUNTER — OFFICE VISIT (OUTPATIENT)
Dept: PHYSICAL THERAPY | Age: 65
End: 2020-08-21
Attending: ORTHOPAEDIC SURGERY
Payer: COMMERCIAL

## 2020-08-21 PROCEDURE — 97110 THERAPEUTIC EXERCISES: CPT | Performed by: PHYSICAL THERAPIST

## 2020-08-21 PROCEDURE — 97140 MANUAL THERAPY 1/> REGIONS: CPT | Performed by: PHYSICAL THERAPIST

## 2020-08-21 NOTE — PROGRESS NOTES
Dx: L Total shoulder         Insurance (Authorized # of Visits):  4/12           Authorizing Physician: Dr. Genie Cantu  Next MD visit: none scheduled  Fall Risk: standard         Precautions: n/a        Surgery 5 weeks , 4 days.      Subjective: Patient repor plane    Charges: MM, Ex 2       Total Timed Treatment: 45 min  Total Treatment Time: 45 min

## 2020-08-24 ENCOUNTER — OFFICE VISIT (OUTPATIENT)
Dept: PHYSICAL THERAPY | Age: 65
End: 2020-08-24
Attending: ORTHOPAEDIC SURGERY
Payer: COMMERCIAL

## 2020-08-24 PROCEDURE — 97140 MANUAL THERAPY 1/> REGIONS: CPT | Performed by: PHYSICAL THERAPIST

## 2020-08-24 PROCEDURE — 97110 THERAPEUTIC EXERCISES: CPT | Performed by: PHYSICAL THERAPIST

## 2020-08-26 ENCOUNTER — OFFICE VISIT (OUTPATIENT)
Dept: PHYSICAL THERAPY | Age: 65
End: 2020-08-26
Attending: ORTHOPAEDIC SURGERY
Payer: COMMERCIAL

## 2020-08-26 PROCEDURE — 97140 MANUAL THERAPY 1/> REGIONS: CPT | Performed by: PHYSICAL THERAPIST

## 2020-08-26 PROCEDURE — 97110 THERAPEUTIC EXERCISES: CPT | Performed by: PHYSICAL THERAPIST

## 2020-08-26 NOTE — PROGRESS NOTES
Dx: L Total shoulder         Insurance (Authorized # of Visits):  6/12           Authorizing Physician: Dr. Andree Lindsay  Next MD visit: none scheduled  Fall Risk: standard         Precautions: n/a        Surgery sp 6 W  2 days    Subjective: Patient reports s ER 2 x 15  Side lying abd 2 x 15 scapular plane  Prone shoulder extension 1 x 10 to side of body  Prone shoulder rhomboid x10  Pendulum x 1 minute  Standing IR slides x 10 with cane     Wall slides x 10  frwd.scaption       HEP: prone scapular retraction w

## 2020-08-28 ENCOUNTER — OFFICE VISIT (OUTPATIENT)
Dept: PHYSICAL THERAPY | Age: 65
End: 2020-08-28
Attending: ORTHOPAEDIC SURGERY
Payer: COMMERCIAL

## 2020-08-28 PROCEDURE — 97110 THERAPEUTIC EXERCISES: CPT | Performed by: PHYSICAL THERAPIST

## 2020-08-28 PROCEDURE — 97140 MANUAL THERAPY 1/> REGIONS: CPT | Performed by: PHYSICAL THERAPIST

## 2020-08-28 NOTE — PROGRESS NOTES
Dx: L Total shoulder         Insurance (Authorized # of Visits):  7/12           Authorizing Physician: Dr. Alonso Domínguez  Next MD visit: none scheduled  Fall Risk: standard         Precautions: n/a        Surgery sp 6 W  4 days    Subjective: Patient reports s slides x 10   frwd        HEP: no changes .      Charges: MM, Ex 2       Total Timed Treatment: 45 min  Total Treatment Time: 45 min

## 2020-08-31 ENCOUNTER — OFFICE VISIT (OUTPATIENT)
Dept: PHYSICAL THERAPY | Age: 65
End: 2020-08-31
Attending: ORTHOPAEDIC SURGERY
Payer: COMMERCIAL

## 2020-08-31 PROCEDURE — 97140 MANUAL THERAPY 1/> REGIONS: CPT | Performed by: PHYSICAL THERAPIST

## 2020-08-31 PROCEDURE — 97110 THERAPEUTIC EXERCISES: CPT | Performed by: PHYSICAL THERAPIST

## 2020-08-31 NOTE — PROGRESS NOTES
Dx: L Total shoulder         Insurance (Authorized # of Visits):  8/12           Authorizing Physician: Dr. Leslye Lake  Next MD visit: none scheduled  Fall Risk: standard         Precautions: n/a        Surgery sp 7 weeks    Subjective: Patient reports the s mobilization during exercise  Prone shoulder extension 1 x 20 to side of body  Prone shoulder rhomboid x10  Pendulum x 1 minute  Standing IR slides x 10 with opposite hand       Wall slides x 10   frwd  With arm lift off wall   Seated flexion x 10 with can

## 2020-09-02 ENCOUNTER — OFFICE VISIT (OUTPATIENT)
Dept: PHYSICAL THERAPY | Age: 65
End: 2020-09-02
Attending: ORTHOPAEDIC SURGERY
Payer: COMMERCIAL

## 2020-09-02 ENCOUNTER — APPOINTMENT (OUTPATIENT)
Dept: PHYSICAL THERAPY | Age: 65
End: 2020-09-02
Attending: ORTHOPAEDIC SURGERY
Payer: COMMERCIAL

## 2020-09-02 PROCEDURE — 97110 THERAPEUTIC EXERCISES: CPT | Performed by: PHYSICAL THERAPIST

## 2020-09-02 PROCEDURE — 97140 MANUAL THERAPY 1/> REGIONS: CPT | Performed by: PHYSICAL THERAPIST

## 2020-09-02 NOTE — PROGRESS NOTES
Dx: L Total shoulder         Insurance (Authorized # of Visits):  9/12           Authorizing Physician: Dr. Penelope Shah MD visit: none scheduled  Fall Risk: standard         Precautions: n/a        Surgery sp 7 weeks 2 days    Subjective: Patient report plane- scapular mobilization during exercise  Prone shoulder extension 1 x 10 1#, 1 x 10 no weight to side of body  Prone shoulder rhomboid x12  Pendulum x 1 minute  Standing IR slides x 10 with opposite hand       Wall slides x 10   frwd  With arm lift of

## 2020-09-04 ENCOUNTER — OFFICE VISIT (OUTPATIENT)
Dept: PHYSICAL THERAPY | Age: 65
End: 2020-09-04
Attending: ORTHOPAEDIC SURGERY
Payer: COMMERCIAL

## 2020-09-04 ENCOUNTER — APPOINTMENT (OUTPATIENT)
Dept: PHYSICAL THERAPY | Age: 65
End: 2020-09-04
Attending: ORTHOPAEDIC SURGERY
Payer: COMMERCIAL

## 2020-09-04 PROCEDURE — 97140 MANUAL THERAPY 1/> REGIONS: CPT | Performed by: PHYSICAL THERAPIST

## 2020-09-04 PROCEDURE — 97110 THERAPEUTIC EXERCISES: CPT | Performed by: PHYSICAL THERAPIST

## 2020-09-04 NOTE — PROGRESS NOTES
Dx: L Total shoulder         Insurance (Authorized # of Visits):  10/12           Authorizing Physician: Dr. Bryant [place]  Next MD visit: none scheduled  Fall Risk: standard         Precautions: n/a        Surgery sp 7 weeks 4 day  PROGRESS NOTE - Has 2 visits progress achieved in PT - compliant with HEP    Plan: Continue scapular mobilization, GH mobilization, strengthening, ROM, advance as able.,  XVRE:9/5//2807  TX#: 10/12   Nu step 5 minutes  GH mobs  Scapular mobs, UT STM - side lying, prone  theraball scap

## 2020-09-09 ENCOUNTER — OFFICE VISIT (OUTPATIENT)
Dept: PHYSICAL THERAPY | Age: 65
End: 2020-09-09
Attending: ORTHOPAEDIC SURGERY
Payer: COMMERCIAL

## 2020-09-09 PROCEDURE — 97140 MANUAL THERAPY 1/> REGIONS: CPT | Performed by: PHYSICAL THERAPIST

## 2020-09-09 PROCEDURE — 97110 THERAPEUTIC EXERCISES: CPT | Performed by: PHYSICAL THERAPIST

## 2020-09-09 NOTE — PROGRESS NOTES
Dx: L Total shoulder         Insurance (Authorized # of Visits):  11/12           Authorizing Physician: Dr. Padmini Wharton  Next MD visit: none scheduled  Fall Risk: standard         Precautions: n/a         PROGRESS NOTE:  Patient will need new referral to con plane  Supine AROM flexion x 15 , - scapular mobilization with exercise by therapist  Side lying ER x 20  Side lying abd x 15 scapular plane- scapular mobilization during exercise  Prone shoulder extension 1 x 20 to side of body- scapular retraction focus

## 2020-09-10 ENCOUNTER — OFFICE VISIT (OUTPATIENT)
Dept: ORTHOPEDICS CLINIC | Facility: CLINIC | Age: 65
End: 2020-09-10
Payer: COMMERCIAL

## 2020-09-10 ENCOUNTER — HOSPITAL ENCOUNTER (OUTPATIENT)
Dept: GENERAL RADIOLOGY | Facility: HOSPITAL | Age: 65
Discharge: HOME | End: 2020-09-10
Attending: ORTHOPAEDIC SURGERY
Payer: COMMERCIAL

## 2020-09-10 VITALS — WEIGHT: 135 LBS | HEIGHT: 61 IN | BODY MASS INDEX: 25.49 KG/M2

## 2020-09-10 DIAGNOSIS — M19.012 PRIMARY OSTEOARTHRITIS OF LEFT SHOULDER: Primary | ICD-10-CM

## 2020-09-10 DIAGNOSIS — Z47.89 ORTHOPEDIC AFTERCARE: ICD-10-CM

## 2020-09-10 PROCEDURE — 73030 X-RAY EXAM OF SHOULDER: CPT | Performed by: ORTHOPAEDIC SURGERY

## 2020-09-10 PROCEDURE — 3008F BODY MASS INDEX DOCD: CPT | Performed by: ORTHOPAEDIC SURGERY

## 2020-09-10 PROCEDURE — 99024 POSTOP FOLLOW-UP VISIT: CPT | Performed by: ORTHOPAEDIC SURGERY

## 2020-09-10 NOTE — PROGRESS NOTES
NURSING INTAKE COMMENTS: Patient presents with:  Post-Op: left shoulder arthroplasty,patient denies pain. Patient will need an extension on PT       HPI: This 60year old female presents today with complaints of left shoulder surgery follow-up.   She is now Maternal Grandfather    • Other (Other) Paternal Grandmother    • Other (Other) Paternal Grandfather    • Hypertension Sister 67       Social History    Occupational History      Not on file    Tobacco Use      Smoking status: Former Smoker        Types: C to 50 degrees. Internal rotation passively is to 60 degrees. Abduction external rotation motor strength is 4-5. Belly press 3 out of 5. Neurological: Left hand neurologically intact light touch sensory motor strength testing.   Lateral shoulder sensatio physical therapy for an additional 2 to 3 weeks. Transition to an independent program thereafter. Resume activities as tolerated. Follow-up x-rays in 6 weeks. Follow Up: Return in about 6 weeks (around 10/22/2020).     Fritz Sequeira MD

## 2020-09-13 RX ORDER — ZOLPIDEM TARTRATE 5 MG/1
TABLET ORAL
Qty: 30 TABLET | Refills: 0 | Status: SHIPPED | OUTPATIENT
Start: 2020-09-13 | End: 2020-11-10

## 2020-09-13 RX ORDER — ALPRAZOLAM 0.25 MG/1
TABLET ORAL
Qty: 60 TABLET | Refills: 0 | Status: SHIPPED | OUTPATIENT
Start: 2020-09-13 | End: 2020-10-12

## 2020-09-16 ENCOUNTER — TELEPHONE (OUTPATIENT)
Dept: ORTHOPEDICS CLINIC | Facility: CLINIC | Age: 65
End: 2020-09-16

## 2020-09-17 ENCOUNTER — OFFICE VISIT (OUTPATIENT)
Dept: PHYSICAL THERAPY | Age: 65
End: 2020-09-17
Attending: ORTHOPAEDIC SURGERY
Payer: COMMERCIAL

## 2020-09-17 PROCEDURE — 97140 MANUAL THERAPY 1/> REGIONS: CPT | Performed by: PHYSICAL THERAPIST

## 2020-09-17 PROCEDURE — 97110 THERAPEUTIC EXERCISES: CPT | Performed by: PHYSICAL THERAPIST

## 2020-09-17 NOTE — PROGRESS NOTES
Dx: L Total shoulder         Insurance (Authorized # of Visits):  12/12           Authorizing Physician: Dr. Elsa Sunshine  Next MD visit: none scheduled  Fall Risk: standard         Precautions: n/a             Subjective: Patient reports MD felt she was doing scapular plane- scapular mobilization during exercise- 1#  Prone shoulder extension 1 x 20 to side of body- scapular retraction focus 2#  Prone shoulder rhomboid x12 - cues for correct scapular posiitoning  Standing IR slides x 10 with cane       Wall slid

## 2020-09-22 ENCOUNTER — OFFICE VISIT (OUTPATIENT)
Dept: PHYSICAL THERAPY | Age: 65
End: 2020-09-22
Attending: ORTHOPAEDIC SURGERY
Payer: COMMERCIAL

## 2020-09-22 PROCEDURE — 97110 THERAPEUTIC EXERCISES: CPT | Performed by: PHYSICAL THERAPIST

## 2020-09-22 PROCEDURE — 97140 MANUAL THERAPY 1/> REGIONS: CPT | Performed by: PHYSICAL THERAPIST

## 2020-09-22 NOTE — PROGRESS NOTES
Dx: L Total shoulder         Insurance (Authorized # of Visits):  1/6          Authorizing Physician: Dr. Emi Phoenix  Next MD visit: none scheduled  Fall Risk: standard         Precautions: n/a             Subjective: Patient reports arm feeling good, some s of body- scapular retraction focus 2#  Prone shoulder rhomboid x12 - cues for correct scapular posiitoning  Standing rows YTB 2 x 10  Standing ER with YTB 2 x 10, IR 2 x 10 YTB  Wall slides x 10 with arm lifts  Wall push ups x 10              HEP: ER/IR/Ro

## 2020-09-27 RX ORDER — ATORVASTATIN CALCIUM 20 MG/1
TABLET, FILM COATED ORAL
Qty: 90 TABLET | Refills: 0 | Status: SHIPPED | OUTPATIENT
Start: 2020-09-27 | End: 2021-09-14

## 2020-09-30 ENCOUNTER — APPOINTMENT (OUTPATIENT)
Dept: PHYSICAL THERAPY | Age: 65
End: 2020-09-30
Attending: ORTHOPAEDIC SURGERY
Payer: COMMERCIAL

## 2020-09-30 ENCOUNTER — TELEPHONE (OUTPATIENT)
Dept: PHYSICAL THERAPY | Age: 65
End: 2020-09-30

## 2020-10-01 ENCOUNTER — OFFICE VISIT (OUTPATIENT)
Dept: PHYSICAL THERAPY | Age: 65
End: 2020-10-01
Attending: ORTHOPAEDIC SURGERY
Payer: COMMERCIAL

## 2020-10-01 PROCEDURE — 97140 MANUAL THERAPY 1/> REGIONS: CPT | Performed by: PHYSICAL THERAPIST

## 2020-10-01 PROCEDURE — 97110 THERAPEUTIC EXERCISES: CPT | Performed by: PHYSICAL THERAPIST

## 2020-10-01 NOTE — PROGRESS NOTES
Dx: L Total shoulder         Insurance (Authorized # of Visits):  2/6          Authorizing Physician: Dr. Padmini Wharton  Next MD visit: none scheduled  Fall Risk: standard         Precautions: n/a             Subjective: Patient reports forgot appointment yeste 10/1///2020  TX#:2/6    UBE 3 min frwd, 3 min back 1. resistance  Gentle jt mobs all motions  Scapular mobs, UT STM- side lying, prone  PROM flex/ER/Abd - scapular plane  Supine AROM flexion x 10 , - scapular mobilization with exercise by therapist -  Federica Boyd

## 2020-10-07 ENCOUNTER — OFFICE VISIT (OUTPATIENT)
Dept: PHYSICAL THERAPY | Age: 65
End: 2020-10-07
Attending: INTERNAL MEDICINE
Payer: COMMERCIAL

## 2020-10-07 PROCEDURE — 97140 MANUAL THERAPY 1/> REGIONS: CPT | Performed by: PHYSICAL THERAPIST

## 2020-10-07 PROCEDURE — 97110 THERAPEUTIC EXERCISES: CPT | Performed by: PHYSICAL THERAPIST

## 2020-10-07 NOTE — PROGRESS NOTES
Dx: L Total shoulder         Insurance (Authorized # of Visits):  3/6          Authorizing Physician: Dr. Uzma Benoit  Next MD visit: none scheduled  Fall Risk: standard         Precautions: n/a             Subjective: Patient reports no pain, continues with flexion 2#  Supine 90-end range with 2# weight to stretch x 10  Side lying ER 1 x 15 1#, 1 x 15 2#  Side lying abd x 10 with 2# 90 to end range for stretch  Prone shoulder extension 2 x 15 3# scapular retraction focus  Prone shoulder rhomboid x12 1# - cues

## 2020-10-12 RX ORDER — ALPRAZOLAM 0.25 MG/1
0.25 TABLET ORAL 2 TIMES DAILY
Qty: 60 TABLET | Refills: 0 | Status: SHIPPED | OUTPATIENT
Start: 2020-10-12 | End: 2020-11-06

## 2020-10-12 NOTE — TELEPHONE ENCOUNTER
Current Outpatient Medications:   •  ALPRAZOLAM 0.25 MG Oral Tab, TAKE 1 TABLET BY MOUTH TWICE DAILY, Disp: 60 tablet, Rfl: 0

## 2020-10-14 ENCOUNTER — OFFICE VISIT (OUTPATIENT)
Dept: PHYSICAL THERAPY | Age: 65
End: 2020-10-14
Attending: INTERNAL MEDICINE
Payer: COMMERCIAL

## 2020-10-14 PROCEDURE — 97140 MANUAL THERAPY 1/> REGIONS: CPT | Performed by: PHYSICAL THERAPIST

## 2020-10-14 PROCEDURE — 97110 THERAPEUTIC EXERCISES: CPT | Performed by: PHYSICAL THERAPIST

## 2020-10-14 NOTE — PROGRESS NOTES
Dx: L Total shoulder         Insurance (Authorized # of Visits):  4/6          Authorizing Physician: Dr. Jerzy Jones  Next MD visit: none scheduled  Fall Risk: standard         Precautions: n/a             Subjective: Patient reports the arm is having more n lying ER 1 x 12 2#  Side lying abd x 10 with 2# 90 to end range for stretch  Prone shoulder extension 1 x 15 3# scapular retraction focus  Prone shoulder rhomboid x12 1# - cues for correct scapular posiitoning  Prone rows 3#  2 x 15  Wall slides x 10 with

## 2020-10-21 ENCOUNTER — OFFICE VISIT (OUTPATIENT)
Dept: PHYSICAL THERAPY | Age: 65
End: 2020-10-21
Attending: INTERNAL MEDICINE
Payer: COMMERCIAL

## 2020-10-21 PROCEDURE — 97140 MANUAL THERAPY 1/> REGIONS: CPT | Performed by: PHYSICAL THERAPIST

## 2020-10-21 PROCEDURE — 97110 THERAPEUTIC EXERCISES: CPT | Performed by: PHYSICAL THERAPIST

## 2020-10-21 NOTE — PROGRESS NOTES
Dx: L Total shoulder         Insurance (Authorized # of Visits):  5/6          Authorizing Physician: Dr. Jerzy Jones  Next MD visit: none scheduled  Fall Risk: standard         Precautions: n/a    PROGRESS/DISCHARGE SUMMARY           Subjective: Patient repo and reaching - Met  · Pt will be independent and compliant with comprehensive HEP to maintain progress achieved in PT - Met    Plan:One additional visit, then will discharge to independent HEP.   Date: 10/21///2020  TX#:5/6    UBE 2 1/2 min frwd, 3 min back

## 2020-10-23 ENCOUNTER — HOSPITAL ENCOUNTER (OUTPATIENT)
Dept: GENERAL RADIOLOGY | Facility: HOSPITAL | Age: 65
Discharge: HOME | End: 2020-10-23
Attending: ORTHOPAEDIC SURGERY
Payer: COMMERCIAL

## 2020-10-23 ENCOUNTER — OFFICE VISIT (OUTPATIENT)
Dept: ORTHOPEDICS CLINIC | Facility: CLINIC | Age: 65
End: 2020-10-23
Payer: COMMERCIAL

## 2020-10-23 DIAGNOSIS — M19.012 PRIMARY OSTEOARTHRITIS OF LEFT SHOULDER: Primary | ICD-10-CM

## 2020-10-23 DIAGNOSIS — Z47.89 ORTHOPEDIC AFTERCARE: ICD-10-CM

## 2020-10-23 PROCEDURE — 73030 X-RAY EXAM OF SHOULDER: CPT | Performed by: ORTHOPAEDIC SURGERY

## 2020-10-23 PROCEDURE — 99212 OFFICE O/P EST SF 10 MIN: CPT | Performed by: ORTHOPAEDIC SURGERY

## 2020-10-23 NOTE — PROGRESS NOTES
NURSING INTAKE COMMENTS: Patient presents with:  Post-Op: s/p Left TSA f/u - had sx on 7/13/2020 - states she is fine - no pain , no other c/o       HPI: This 60year old female presents today with complaints of left shoulder surgery follow-up.   She is 3 m Grandfather    • Other (Other) Paternal Grandmother    • Other (Other) Paternal Grandfather    • Hypertension Sister 67       Social History    Occupational History      Not on file    Tobacco Use      Smoking status: Former Smoker        Types: Cigarettes rotation motor strength is 4+. Belly press 4 out of 5. Neurological: Left hand neurologically intact light touch sensory and motor strength testing. Light touch sensation intact over the lateral shoulder. Imaging:   No results found.      X-rays of th

## 2020-10-26 ENCOUNTER — TELEPHONE (OUTPATIENT)
Dept: PHYSICAL THERAPY | Age: 65
End: 2020-10-26

## 2020-10-26 ENCOUNTER — APPOINTMENT (OUTPATIENT)
Dept: PHYSICAL THERAPY | Age: 65
End: 2020-10-26
Attending: INTERNAL MEDICINE
Payer: COMMERCIAL

## 2020-10-28 ENCOUNTER — APPOINTMENT (OUTPATIENT)
Dept: PHYSICAL THERAPY | Age: 65
End: 2020-10-28
Attending: INTERNAL MEDICINE
Payer: COMMERCIAL

## 2020-10-30 ENCOUNTER — OFFICE VISIT (OUTPATIENT)
Dept: PHYSICAL THERAPY | Age: 65
End: 2020-10-30
Attending: INTERNAL MEDICINE
Payer: COMMERCIAL

## 2020-10-30 PROCEDURE — 97140 MANUAL THERAPY 1/> REGIONS: CPT | Performed by: PHYSICAL THERAPIST

## 2020-10-30 PROCEDURE — 97110 THERAPEUTIC EXERCISES: CPT | Performed by: PHYSICAL THERAPIST

## 2020-10-30 NOTE — PROGRESS NOTES
Dx: L Total shoulder         Insurance (Authorized # of Visits):  6/6          Authorizing Physician: Dr. Gertrude Cheung  Next MD visit: none scheduled  Fall Risk: standard         Precautions: n/a    DISCHARGE - SUMMARY SENT LAST NOTE AS PATIENT SEEING MD> lying abd x 10 with 90 to end range for stretch  Prone shoulder extension 2# x 10  Prone horozontal scapp add x 10  Reviewed theraband exercises  Added:  Standing flexion               HEP:standing flexion    Charges: MM, Ex 2     Total Timed Treatment: 45

## 2020-11-06 ENCOUNTER — OFFICE VISIT (OUTPATIENT)
Dept: INTERNAL MEDICINE CLINIC | Facility: CLINIC | Age: 65
End: 2020-11-06

## 2020-11-06 VITALS
TEMPERATURE: 98 F | BODY MASS INDEX: 28.7 KG/M2 | HEIGHT: 61 IN | DIASTOLIC BLOOD PRESSURE: 71 MMHG | SYSTOLIC BLOOD PRESSURE: 155 MMHG | WEIGHT: 152 LBS | HEART RATE: 65 BPM

## 2020-11-06 DIAGNOSIS — I10 ESSENTIAL HYPERTENSION: ICD-10-CM

## 2020-11-06 DIAGNOSIS — F41.9 ANXIETY: ICD-10-CM

## 2020-11-06 DIAGNOSIS — Z12.31 VISIT FOR SCREENING MAMMOGRAM: ICD-10-CM

## 2020-11-06 DIAGNOSIS — Z00.00 PHYSICAL EXAM: Primary | ICD-10-CM

## 2020-11-06 PROCEDURE — 3077F SYST BP >= 140 MM HG: CPT | Performed by: INTERNAL MEDICINE

## 2020-11-06 PROCEDURE — 3008F BODY MASS INDEX DOCD: CPT | Performed by: INTERNAL MEDICINE

## 2020-11-06 PROCEDURE — 3078F DIAST BP <80 MM HG: CPT | Performed by: INTERNAL MEDICINE

## 2020-11-06 PROCEDURE — 99396 PREV VISIT EST AGE 40-64: CPT | Performed by: INTERNAL MEDICINE

## 2020-11-06 RX ORDER — AMLODIPINE BESYLATE 5 MG/1
5 TABLET ORAL DAILY
Qty: 90 TABLET | Refills: 3 | Status: SHIPPED | OUTPATIENT
Start: 2020-11-06 | End: 2021-11-05

## 2020-11-06 RX ORDER — ALPRAZOLAM 0.25 MG/1
0.25 TABLET ORAL 2 TIMES DAILY
Qty: 60 TABLET | Refills: 0 | Status: SHIPPED | OUTPATIENT
Start: 2020-11-06 | End: 2020-12-09

## 2020-11-06 NOTE — PROGRESS NOTES
HPI:    Patient ID: Marci Chang is a 60year old female.     HPI       PHys exam  Had left shoulder surgery and recovered well after 4 months complain of anxiety blood pressure is higher and she gained weight patient is not on any special diet    tablet (0.25 mg total) by mouth 2 (two) times daily.  60 tablet 0   • ATORVASTATIN 20 MG Oral Tab TAKE 1 TABLET BY MOUTH DAILY AT NIGHT 90 tablet 0   • ZOLPIDEM 5 MG Oral Tab TAKE 1 TABLET BY MOUTH EVERY NIGHT AT BEDTIME 30 tablet 0   • FOSINOPRIL SODIUM-HC oropharyngeal exudate. Eyes: Pupils are equal, round, and reactive to light. Conjunctivae and EOM are normal. Right eye exhibits no discharge. Left eye exhibits no discharge. No scleral icterus. Neck: Neck supple. No thyromegaly present.    Luster Raw Thyroid Stim Hormone      Free T4, (Free Thyroxine)      Lipid Panel      CBC, Platelet;  No Differential      Comp Metabolic Panel (14)      Hemoglobin A1C      Urine Microscopic w Reflex CULTURE      Meds This Visit:  Requested Prescriptions     Signed Pr

## 2020-11-07 ENCOUNTER — TELEPHONE (OUTPATIENT)
Dept: INTERNAL MEDICINE CLINIC | Facility: CLINIC | Age: 65
End: 2020-11-07

## 2020-11-07 NOTE — TELEPHONE ENCOUNTER
Patient is requesting a call back when she should start her new blood pressure medication. Patient stated she is still taking her FOSINOPRIL SODIUM-HCTZ 20-12.5 MG Oral Tab medication. Please advise.     amLODIPine Besylate 5 MG Oral Tab 90 tablet 3 11/6

## 2020-11-07 NOTE — TELEPHONE ENCOUNTER
Left message to pt to call back. Also [employer] message with MD recommendation sent to pt.                Future Appointments   Date Time Provider Ute [place]ne   11/11/2020  8:45 AM [facility] SCHEDULED [facility] [place]   12/11/2020  3:20 PM Zoya

## 2020-11-10 RX ORDER — ZOLPIDEM TARTRATE 5 MG/1
TABLET ORAL
Qty: 30 TABLET | Refills: 0 | Status: SHIPPED | OUTPATIENT
Start: 2020-11-10 | End: 2021-03-12

## 2020-11-11 ENCOUNTER — LAB ENCOUNTER (OUTPATIENT)
Dept: LAB | Age: 65
End: 2020-11-11
Attending: INTERNAL MEDICINE
Payer: COMMERCIAL

## 2020-11-11 DIAGNOSIS — Z00.00 PHYSICAL EXAM: ICD-10-CM

## 2020-11-11 PROCEDURE — 83036 HEMOGLOBIN GLYCOSYLATED A1C: CPT

## 2020-11-11 PROCEDURE — 84443 ASSAY THYROID STIM HORMONE: CPT

## 2020-11-11 PROCEDURE — 85027 COMPLETE CBC AUTOMATED: CPT

## 2020-11-11 PROCEDURE — 84439 ASSAY OF FREE THYROXINE: CPT

## 2020-11-11 PROCEDURE — 80053 COMPREHEN METABOLIC PANEL: CPT

## 2020-11-11 PROCEDURE — 36415 COLL VENOUS BLD VENIPUNCTURE: CPT

## 2020-11-11 PROCEDURE — 80061 LIPID PANEL: CPT

## 2020-11-11 PROCEDURE — 81015 MICROSCOPIC EXAM OF URINE: CPT

## 2020-12-09 RX ORDER — ALPRAZOLAM 0.25 MG/1
TABLET ORAL
Qty: 60 TABLET | Refills: 0 | Status: SHIPPED | OUTPATIENT
Start: 2020-12-09 | End: 2021-01-18

## 2020-12-24 RX ORDER — ATORVASTATIN CALCIUM 20 MG/1
TABLET, FILM COATED ORAL
Qty: 90 TABLET | Refills: 0 | Status: SHIPPED | OUTPATIENT
Start: 2020-12-24 | End: 2021-05-14

## 2021-01-18 RX ORDER — ALPRAZOLAM 0.25 MG/1
TABLET ORAL
Qty: 60 TABLET | Refills: 0 | Status: SHIPPED | OUTPATIENT
Start: 2021-01-18 | End: 2021-02-28

## 2021-01-30 ENCOUNTER — HOSPITAL ENCOUNTER (OUTPATIENT)
Dept: MAMMOGRAPHY | Facility: HOSPITAL | Age: 66
Discharge: HOME | End: 2021-01-30
Attending: INTERNAL MEDICINE
Payer: COMMERCIAL

## 2021-01-30 DIAGNOSIS — Z12.31 VISIT FOR SCREENING MAMMOGRAM: ICD-10-CM

## 2021-01-30 PROCEDURE — 77063 BREAST TOMOSYNTHESIS BI: CPT | Performed by: INTERNAL MEDICINE

## 2021-01-30 PROCEDURE — 77067 SCR MAMMO BI INCL CAD: CPT | Performed by: INTERNAL MEDICINE

## 2021-02-03 ENCOUNTER — TELEPHONE (OUTPATIENT)
Dept: INTERNAL MEDICINE CLINIC | Facility: CLINIC | Age: 66
End: 2021-02-03

## 2021-02-03 NOTE — TELEPHONE ENCOUNTER
Patient calling in regards to message from [place] that she viewed in regards to her mammogram.  She asked if she needed to follow up with Dr. Simon Hays to get the breast ultrasound completed.  She was advised that radiology will reach out to her to scheduled

## 2021-02-12 ENCOUNTER — HOSPITAL ENCOUNTER (OUTPATIENT)
Dept: ULTRASOUND IMAGING | Facility: HOSPITAL | Age: 66
Discharge: HOME | End: 2021-02-12
Attending: INTERNAL MEDICINE
Payer: COMMERCIAL

## 2021-02-12 ENCOUNTER — HOSPITAL ENCOUNTER (OUTPATIENT)
Dept: MAMMOGRAPHY | Facility: HOSPITAL | Age: 66
Discharge: HOME | End: 2021-02-12
Attending: INTERNAL MEDICINE
Payer: COMMERCIAL

## 2021-02-12 DIAGNOSIS — R92.8 ABNORMAL MAMMOGRAM: ICD-10-CM

## 2021-02-12 PROCEDURE — 76642 ULTRASOUND BREAST LIMITED: CPT | Performed by: INTERNAL MEDICINE

## 2021-02-12 PROCEDURE — 77065 DX MAMMO INCL CAD UNI: CPT | Performed by: INTERNAL MEDICINE

## 2021-02-12 PROCEDURE — 77061 BREAST TOMOSYNTHESIS UNI: CPT | Performed by: INTERNAL MEDICINE

## 2021-02-28 RX ORDER — ALPRAZOLAM 0.25 MG/1
TABLET ORAL
Qty: 60 TABLET | Refills: 0 | Status: SHIPPED | OUTPATIENT
Start: 2021-02-28 | End: 2021-04-07

## 2021-03-01 RX ORDER — FOSINOPRIL SODIUM AND HYDROCHLOROTHIAZIDE 20; 12.5 MG/1; MG/1
TABLET ORAL
Qty: 90 TABLET | Refills: 1 | Status: SHIPPED | OUTPATIENT
Start: 2021-03-01 | End: 2021-09-03

## 2021-03-12 ENCOUNTER — OFFICE VISIT (OUTPATIENT)
Dept: SURGERY | Facility: CLINIC | Age: 66
End: 2021-03-12
Payer: COMMERCIAL

## 2021-03-12 VITALS
DIASTOLIC BLOOD PRESSURE: 68 MMHG | BODY MASS INDEX: 28.82 KG/M2 | HEART RATE: 72 BPM | HEIGHT: 59.9 IN | WEIGHT: 146.81 LBS | SYSTOLIC BLOOD PRESSURE: 124 MMHG | OXYGEN SATURATION: 98 %

## 2021-03-12 DIAGNOSIS — F41.1 ANXIETY STATE: ICD-10-CM

## 2021-03-12 DIAGNOSIS — R73.03 PREDIABETES: ICD-10-CM

## 2021-03-12 DIAGNOSIS — I10 ESSENTIAL HYPERTENSION: ICD-10-CM

## 2021-03-12 DIAGNOSIS — R63.5 WEIGHT GAIN: ICD-10-CM

## 2021-03-12 DIAGNOSIS — M19.012 PRIMARY OSTEOARTHRITIS OF LEFT SHOULDER: ICD-10-CM

## 2021-03-12 DIAGNOSIS — E55.9 VITAMIN D DEFICIENCY: ICD-10-CM

## 2021-03-12 DIAGNOSIS — E66.3 PATIENT OVERWEIGHT: ICD-10-CM

## 2021-03-12 DIAGNOSIS — E78.5 HYPERLIPIDEMIA, UNSPECIFIED HYPERLIPIDEMIA TYPE: Primary | Chronic | ICD-10-CM

## 2021-03-12 PROCEDURE — 99204 OFFICE O/P NEW MOD 45 MIN: CPT | Performed by: NURSE PRACTITIONER

## 2021-03-12 PROCEDURE — 3008F BODY MASS INDEX DOCD: CPT | Performed by: NURSE PRACTITIONER

## 2021-03-12 PROCEDURE — 3078F DIAST BP <80 MM HG: CPT | Performed by: NURSE PRACTITIONER

## 2021-03-12 PROCEDURE — 3074F SYST BP LT 130 MM HG: CPT | Performed by: NURSE PRACTITIONER

## 2021-03-12 RX ORDER — TOPIRAMATE 25 MG/1
25 TABLET ORAL DAILY
Qty: 30 TABLET | Refills: 1 | Status: SHIPPED | OUTPATIENT
Start: 2021-03-12 | End: 2021-06-03

## 2021-03-12 RX ORDER — PHENTERMINE HYDROCHLORIDE 15 MG/1
15 CAPSULE ORAL EVERY MORNING
Qty: 30 CAPSULE | Refills: 1 | Status: SHIPPED | OUTPATIENT
Start: 2021-03-12 | End: 2021-05-07

## 2021-03-12 NOTE — PROGRESS NOTES
[facility] Consultation Note       Date of Consult:  3/12/2021    Patient:  Estefani Campos  :      1955  MRN:      [id number]    Referring Provider: Dr. Juan Lyman       Chief Complaint:  Patient presents with:  Consult  Weight M Sig Dispense Refill   • FOSINOPRIL SODIUM-HCTZ 20-12.5 MG Oral Tab TAKE 1 TABLET BY MOUTH EVERY DAY 90 tablet 1   • ATORVASTATIN 20 MG Oral Tab TAKE 1 TABLET BY MOUTH EVERY EVENING 90 tablet 0   • amLODIPine Besylate 5 MG Oral Tab Take 1 tablet (5 mg total Expenses:   Food Insecurity:       Worried About [address] in the Last Year:       Ran Out of Food in the Last Year:   Transportation Needs:       Lack of Transportation (Medical):       Lack of Transportation (Non-Medical):   Physical Activity: chicken, beef)  Rice  Fresh vegetable- broccoli     After dinner behavior: potato chips, saltine crackers  Night eating: in the past   Portion sizes:   Binge: BED 7-  Emotional:   Depression: Total PHQ Score: 2  Grazing: -  Sweet tooth: -  Crunchy/salty: + Skin color, texture, turgor normal. No rashes or lesions  Neurologic: Grossly normal    ASSESSMENT       Encounter Diagnosis(ses):   Hyperlipidemia, unspecified hyperlipidemia type  (primary encounter diagnosis)  Weight gain  Essential hypertension  Primar carbohydrates, refined grains, flour, sugar. Goals for next month:  1. Keep a food log. Meet with RD.   2. Drink 64 ounces of non-caloric beverages per day. No fruit juices or regular soda. 3. Aim for 150 minutes moderate exercise per week.   M-F dog wa

## 2021-03-12 NOTE — PATIENT INSTRUCTIONS
Aim for 1200 calories/day. Aim for 100-110 grams/carbs/day. 1. Do a house cleanse, remove unhealthy foods from the house. 2. Aim to eat a whole, plant strong, low glycemic index diet. 3. Focus on the quality of your diet.   4. Get in the habit of r strength twice a week. Focus on balance. Sleep:  Magnesium glycinate ([facility]'s Best) 200 to 400 mg/day. Breakfast:  1. Fruit and nuts/seeds. 2. Eggs scrambled with vegetables, cottage cheese.   3. Oatmeal (stovetop), cinnamon, 2 tbsp flaxseed, lisbet

## 2021-03-18 RX ORDER — ZOLPIDEM TARTRATE 5 MG/1
5 TABLET ORAL NIGHTLY
Qty: 30 TABLET | Refills: 0 | Status: SHIPPED | OUTPATIENT
Start: 2021-03-18 | End: 2021-05-07

## 2021-03-18 NOTE — TELEPHONE ENCOUNTER
CONTROLLED SUBSTANCE    ZOLPIDEM 5MG TABLETS, TAKE ONE TABLET BY MOUTH EVERY NIGHT AT BEDTIME. QTY 30

## 2021-04-08 RX ORDER — ALPRAZOLAM 0.25 MG/1
0.25 TABLET ORAL 2 TIMES DAILY
Qty: 60 TABLET | Refills: 0 | Status: SHIPPED | OUTPATIENT
Start: 2021-04-08 | End: 2021-05-10

## 2021-04-19 ENCOUNTER — OFFICE VISIT (OUTPATIENT)
Dept: SURGERY | Facility: CLINIC | Age: 66
End: 2021-04-19
Payer: COMMERCIAL

## 2021-04-19 VITALS
OXYGEN SATURATION: 96 % | SYSTOLIC BLOOD PRESSURE: 126 MMHG | HEIGHT: 59.9 IN | DIASTOLIC BLOOD PRESSURE: 75 MMHG | WEIGHT: 139.13 LBS | BODY MASS INDEX: 27.32 KG/M2 | HEART RATE: 75 BPM

## 2021-04-19 DIAGNOSIS — M19.012 PRIMARY OSTEOARTHRITIS OF LEFT SHOULDER: ICD-10-CM

## 2021-04-19 DIAGNOSIS — I10 ESSENTIAL HYPERTENSION: Primary | ICD-10-CM

## 2021-04-19 DIAGNOSIS — R73.03 PREDIABETES: ICD-10-CM

## 2021-04-19 DIAGNOSIS — E66.3 PATIENT OVERWEIGHT: ICD-10-CM

## 2021-04-19 DIAGNOSIS — F41.1 ANXIETY STATE: ICD-10-CM

## 2021-04-19 DIAGNOSIS — E55.9 VITAMIN D DEFICIENCY: ICD-10-CM

## 2021-04-19 DIAGNOSIS — E78.5 HYPERLIPIDEMIA, UNSPECIFIED HYPERLIPIDEMIA TYPE: ICD-10-CM

## 2021-04-19 PROCEDURE — 3008F BODY MASS INDEX DOCD: CPT | Performed by: NURSE PRACTITIONER

## 2021-04-19 PROCEDURE — 99214 OFFICE O/P EST MOD 30 MIN: CPT | Performed by: NURSE PRACTITIONER

## 2021-04-19 PROCEDURE — 3078F DIAST BP <80 MM HG: CPT | Performed by: NURSE PRACTITIONER

## 2021-04-19 PROCEDURE — 3074F SYST BP LT 130 MM HG: CPT | Performed by: NURSE PRACTITIONER

## 2021-04-19 NOTE — PROGRESS NOTES
[address]  Dept: [phone]       Patient:  Hilda Johnson  :      1955  MRN:      [id number]    Chief Complaint:  Patient pres -07   Start weight: 146    Wt Readings from Last 6 Encounters:  04/19/21 : 139 lb 1.6 oz (63.1 kg)  03/12/21 : 146 lb 12.8 oz (66.6 kg)  11/06/20 : 152 lb (68.9 kg)  09/10/20 : 135 lb (61.2 kg)  07/13/20 : 142 lb (64.4 kg)  06/19/20 : 145 lb (65.8 kg) Asked        Weight Concern: Not Asked        Special Diet: Not Asked        Back Care: Not Asked        Exercise: Not Asked        Bike Helmet: Not Asked        Seat Belt: Not Asked        Self-Exams: Not Asked        Grew up on a farm: Not Asked        H Other (Other) Maternal Grandmother    • Other (Other) Maternal Grandfather    • Other (Other) Paternal Grandmother    • Other (Other) Paternal Grandfather    • Hypertension Sister 67   • Breast Cancer Neg    • Ovarian Cancer Neg      Initial Intake:   Christe negative  Respiratory: negative  Cardiovascular: negative  Gastrointestinal: positive for constipation  Integument/breast: negative  Hematologic/lymphatic: negative  Musculoskeletal:negative  Neurological: positive for headaches  Behavioral/Psych: negative Blood pressure stable on the above medications.  No interval change in antihypertensive medication.      OVERWEIGHT/WEIGHT GAIN:  PREDIABETES:     Discussed starting weight: 146.8 lbs; BMI: 28.77; WC:   Patient's goal weight: 125 lbs   Values: Kids, grandch appointment to have the medication refilled. Must avoid pregnancy during use, counseled on adequate contraception during use.  Patient states understanding of all information and instructions.      EKG done 6/19/20.      Prefers not to take an injectable me

## 2021-04-19 NOTE — PATIENT INSTRUCTIONS
Exercise Goals Reviewed and Discussed    Walk for  Walk for 8,000 steps/day    Start magnesium for sleep/constipation. 200 to 400 mg/day. Doctor's Best. Mariposa Francy. Or Stool softener. Or Psyllium husk with glass of water. Aim for 1200 calories/day. daily in winter, 2000 in summer. Exercise:   M-F dog walks- 1 mile/day. Add strength twice a week. Focus on balance. Sleep:  Magnesium glycinate (Doctor's Best) 200 to 400 mg/day. Breakfast:  1. Fruit and nuts/seeds.   2. Eggs scrambled with v

## 2021-05-07 DIAGNOSIS — R73.03 PREDIABETES: ICD-10-CM

## 2021-05-07 DIAGNOSIS — F41.1 ANXIETY STATE: ICD-10-CM

## 2021-05-07 DIAGNOSIS — E55.9 VITAMIN D DEFICIENCY: ICD-10-CM

## 2021-05-07 DIAGNOSIS — M19.012 PRIMARY OSTEOARTHRITIS OF LEFT SHOULDER: ICD-10-CM

## 2021-05-07 DIAGNOSIS — E66.3 PATIENT OVERWEIGHT: ICD-10-CM

## 2021-05-07 DIAGNOSIS — I10 ESSENTIAL HYPERTENSION: ICD-10-CM

## 2021-05-07 DIAGNOSIS — R63.5 WEIGHT GAIN: ICD-10-CM

## 2021-05-07 DIAGNOSIS — E78.5 HYPERLIPIDEMIA, UNSPECIFIED HYPERLIPIDEMIA TYPE: Chronic | ICD-10-CM

## 2021-05-07 RX ORDER — PHENTERMINE HYDROCHLORIDE 15 MG/1
CAPSULE ORAL
Qty: 30 CAPSULE | Refills: 0 | Status: SHIPPED | OUTPATIENT
Start: 2021-05-07 | End: 2021-06-03

## 2021-05-07 RX ORDER — ZOLPIDEM TARTRATE 5 MG/1
TABLET ORAL
Qty: 30 TABLET | Refills: 0 | Status: SHIPPED | OUTPATIENT
Start: 2021-05-07 | End: 2021-08-24

## 2021-05-10 RX ORDER — ALPRAZOLAM 0.25 MG/1
TABLET ORAL
Qty: 60 TABLET | Refills: 0 | Status: SHIPPED | OUTPATIENT
Start: 2021-05-10 | End: 2021-06-13

## 2021-05-14 RX ORDER — ATORVASTATIN CALCIUM 20 MG/1
TABLET, FILM COATED ORAL
Qty: 90 TABLET | Refills: 0 | Status: SHIPPED | OUTPATIENT
Start: 2021-05-14 | End: 2021-06-23

## 2021-06-03 ENCOUNTER — OFFICE VISIT (OUTPATIENT)
Dept: SURGERY | Facility: CLINIC | Age: 66
End: 2021-06-03
Payer: COMMERCIAL

## 2021-06-03 VITALS
WEIGHT: 132.19 LBS | HEART RATE: 78 BPM | OXYGEN SATURATION: 97 % | DIASTOLIC BLOOD PRESSURE: 84 MMHG | SYSTOLIC BLOOD PRESSURE: 123 MMHG | HEIGHT: 59.9 IN | BODY MASS INDEX: 25.95 KG/M2

## 2021-06-03 DIAGNOSIS — R73.03 PREDIABETES: ICD-10-CM

## 2021-06-03 DIAGNOSIS — E66.3 PATIENT OVERWEIGHT: ICD-10-CM

## 2021-06-03 DIAGNOSIS — R63.5 WEIGHT GAIN: ICD-10-CM

## 2021-06-03 DIAGNOSIS — I10 ESSENTIAL HYPERTENSION: Primary | ICD-10-CM

## 2021-06-03 DIAGNOSIS — E55.9 VITAMIN D DEFICIENCY: ICD-10-CM

## 2021-06-03 DIAGNOSIS — F41.1 ANXIETY STATE: ICD-10-CM

## 2021-06-03 DIAGNOSIS — E78.5 HYPERLIPIDEMIA, UNSPECIFIED HYPERLIPIDEMIA TYPE: ICD-10-CM

## 2021-06-03 DIAGNOSIS — M19.012 PRIMARY OSTEOARTHRITIS OF LEFT SHOULDER: ICD-10-CM

## 2021-06-03 PROCEDURE — 3079F DIAST BP 80-89 MM HG: CPT | Performed by: NURSE PRACTITIONER

## 2021-06-03 PROCEDURE — 99214 OFFICE O/P EST MOD 30 MIN: CPT | Performed by: NURSE PRACTITIONER

## 2021-06-03 PROCEDURE — 3074F SYST BP LT 130 MM HG: CPT | Performed by: NURSE PRACTITIONER

## 2021-06-03 PROCEDURE — 3008F BODY MASS INDEX DOCD: CPT | Performed by: NURSE PRACTITIONER

## 2021-06-03 RX ORDER — TOPIRAMATE 25 MG/1
25 TABLET ORAL DAILY
Qty: 30 TABLET | Refills: 1 | Status: SHIPPED | OUTPATIENT
Start: 2021-06-03 | End: 2021-08-05

## 2021-06-03 RX ORDER — PHENTERMINE HYDROCHLORIDE 15 MG/1
15 CAPSULE ORAL EVERY MORNING
Qty: 30 CAPSULE | Refills: 1 | Status: SHIPPED | OUTPATIENT
Start: 2021-06-03 | End: 2021-08-05

## 2021-06-03 NOTE — PROGRESS NOTES
[address]  Dept: [phone]       Patient:  Magda Horner  :      1955  MRN:      [id number]    Chief Complaint:  Patient pres -14   Start weight: 146    Wt Readings from Last 6 Encounters:  06/03/21 : 132 lb 3.2 oz (60 kg)  04/19/21 : 139 lb 1.6 oz (63.1 kg)  03/12/21 : 146 lb 12.8 oz (66.6 kg)  11/06/20 : 152 lb (68.9 kg)  09/10/20 : 135 lb (61.2 kg)  07/13/20 : 142 lb (64.4 kg) Weight Concern: Not Asked        Special Diet: Not Asked        Back Care: Not Asked        Exercise: Not Asked        Bike Helmet: Not Asked        Seat Belt: Not Asked        Self-Exams: Not Asked        Grew up on a farm: Not Asked        History of tan Maternal Grandmother    • Other (Other) Maternal Grandfather    • Other (Other) Paternal Grandmother    • Other (Other) Paternal Grandfather    • Hypertension Sister 67   • Breast Cancer Neg    • Ovarian Cancer Neg      Initial Intake:   After dinner behav negative  Respiratory: negative  Cardiovascular: negative  Gastrointestinal: positive for constipation  Integument/breast: negative  Hematologic/lymphatic: negative  Musculoskeletal:negative  Neurological: positive for headaches  Behavioral/Psych: negative total) by mouth daily. Vitamin D deficiency  -     Phentermine HCl 15 MG Oral Cap; Take 1 capsule (15 mg total) by mouth every morning.  -     topiramate 25 MG Oral Tab; Take 1 tablet (25 mg total) by mouth daily.     Anxiety state  -     Phentermine HCl than 2,400 mg/day, and at least 150 minutes of moderate physical activity per week.    Discussed the importance of whole food, plant powered, minimally to non-processed diet rich in fruits, vegetables, whole grains and healthy fats, and meats/seafood withou    Katharine Herzog, APRN

## 2021-06-03 NOTE — PATIENT INSTRUCTIONS
Dressin minced garlic clove  Juice from 1/2 lemon  1 1/2 tbsp olive oil  Salt and pepper to taste    Chickpea salad:  Lay a bed spinach  1 can chickpeas, whole  Dice celery to your liking  Dice green onion to your liking  1-3 dill pickles  Dice red

## 2021-06-13 RX ORDER — ALPRAZOLAM 0.25 MG/1
TABLET ORAL
Qty: 60 TABLET | Refills: 0 | Status: SHIPPED | OUTPATIENT
Start: 2021-06-13 | End: 2021-07-20

## 2021-06-23 RX ORDER — ATORVASTATIN CALCIUM 20 MG/1
TABLET, FILM COATED ORAL
Qty: 90 TABLET | Refills: 0 | Status: SHIPPED | OUTPATIENT
Start: 2021-06-23 | End: 2021-09-07

## 2021-07-20 RX ORDER — ALPRAZOLAM 0.25 MG/1
TABLET ORAL
Qty: 60 TABLET | Refills: 0 | Status: SHIPPED | OUTPATIENT
Start: 2021-07-20 | End: 2021-09-03

## 2021-08-03 ENCOUNTER — LAB ENCOUNTER (OUTPATIENT)
Dept: LAB | Age: 66
End: 2021-08-03
Attending: NURSE PRACTITIONER
Payer: MEDICARE

## 2021-08-03 DIAGNOSIS — E66.3 PATIENT OVERWEIGHT: ICD-10-CM

## 2021-08-03 DIAGNOSIS — E78.5 HYPERLIPIDEMIA, UNSPECIFIED HYPERLIPIDEMIA TYPE: ICD-10-CM

## 2021-08-03 DIAGNOSIS — F41.1 ANXIETY STATE: ICD-10-CM

## 2021-08-03 DIAGNOSIS — E55.9 VITAMIN D DEFICIENCY: ICD-10-CM

## 2021-08-03 DIAGNOSIS — I10 ESSENTIAL HYPERTENSION: ICD-10-CM

## 2021-08-03 DIAGNOSIS — R73.01 ABNORMAL FASTING GLUCOSE: ICD-10-CM

## 2021-08-03 DIAGNOSIS — M19.012 PRIMARY OSTEOARTHRITIS OF LEFT SHOULDER: ICD-10-CM

## 2021-08-03 DIAGNOSIS — R63.5 WEIGHT GAIN: ICD-10-CM

## 2021-08-03 DIAGNOSIS — R73.03 PREDIABETES: ICD-10-CM

## 2021-08-03 LAB
ALT SERPL-CCNC: 32 U/L
ANION GAP SERPL CALC-SCNC: 8 MMOL/L (ref 0–18)
AST SERPL-CCNC: 15 U/L (ref 15–37)
BUN BLD-MCNC: 21 MG/DL (ref 7–18)
BUN/CREAT SERPL: 16 (ref 10–20)
CALCIUM BLD-MCNC: 9.9 MG/DL (ref 8.5–10.1)
CHLORIDE SERPL-SCNC: 103 MMOL/L (ref 98–112)
CHOLEST SMN-MCNC: 198 MG/DL (ref ?–200)
CO2 SERPL-SCNC: 29 MMOL/L (ref 21–32)
CREAT BLD-MCNC: 1.31 MG/DL
EST. AVERAGE GLUCOSE BLD GHB EST-MCNC: 117 MG/DL (ref 68–126)
GLUCOSE BLD-MCNC: 98 MG/DL (ref 70–99)
HBA1C MFR BLD HPLC: 5.7 % (ref ?–5.7)
HDLC SERPL-MCNC: 66 MG/DL (ref 40–59)
LDLC SERPL CALC-MCNC: 100 MG/DL (ref ?–100)
NONHDLC SERPL-MCNC: 132 MG/DL (ref ?–130)
OSMOLALITY SERPL CALC.SUM OF ELEC: 293 MOSM/KG (ref 275–295)
PATIENT FASTING Y/N/NP: YES
PATIENT FASTING Y/N/NP: YES
POTASSIUM SERPL-SCNC: 3.9 MMOL/L (ref 3.5–5.1)
SODIUM SERPL-SCNC: 140 MMOL/L (ref 136–145)
TRIGL SERPL-MCNC: 186 MG/DL (ref 30–149)
VIT B12 SERPL-MCNC: 608 PG/ML (ref 193–986)
VLDLC SERPL CALC-MCNC: 31 MG/DL (ref 0–30)

## 2021-08-03 PROCEDURE — 83036 HEMOGLOBIN GLYCOSYLATED A1C: CPT

## 2021-08-03 PROCEDURE — 80048 BASIC METABOLIC PNL TOTAL CA: CPT

## 2021-08-03 PROCEDURE — 36415 COLL VENOUS BLD VENIPUNCTURE: CPT

## 2021-08-03 PROCEDURE — 80061 LIPID PANEL: CPT

## 2021-08-03 PROCEDURE — 84460 ALANINE AMINO (ALT) (SGPT): CPT

## 2021-08-03 PROCEDURE — 84450 TRANSFERASE (AST) (SGOT): CPT

## 2021-08-03 PROCEDURE — 82607 VITAMIN B-12: CPT

## 2021-08-05 ENCOUNTER — OFFICE VISIT (OUTPATIENT)
Dept: SURGERY | Facility: CLINIC | Age: 66
End: 2021-08-05
Payer: MEDICARE

## 2021-08-05 ENCOUNTER — TELEPHONE (OUTPATIENT)
Dept: SURGERY | Facility: CLINIC | Age: 66
End: 2021-08-05

## 2021-08-05 VITALS
BODY MASS INDEX: 24.64 KG/M2 | HEART RATE: 88 BPM | OXYGEN SATURATION: 98 % | HEIGHT: 59.9 IN | WEIGHT: 125.5 LBS | SYSTOLIC BLOOD PRESSURE: 119 MMHG | DIASTOLIC BLOOD PRESSURE: 75 MMHG

## 2021-08-05 DIAGNOSIS — F41.1 ANXIETY STATE: ICD-10-CM

## 2021-08-05 DIAGNOSIS — M19.012 PRIMARY OSTEOARTHRITIS OF LEFT SHOULDER: ICD-10-CM

## 2021-08-05 DIAGNOSIS — I10 ESSENTIAL HYPERTENSION: Primary | ICD-10-CM

## 2021-08-05 DIAGNOSIS — E66.3 PATIENT OVERWEIGHT: ICD-10-CM

## 2021-08-05 DIAGNOSIS — E66.3 PATIENT OVERWEIGHT: Primary | ICD-10-CM

## 2021-08-05 DIAGNOSIS — E78.5 HYPERLIPIDEMIA, UNSPECIFIED HYPERLIPIDEMIA TYPE: ICD-10-CM

## 2021-08-05 DIAGNOSIS — R73.03 PREDIABETES: ICD-10-CM

## 2021-08-05 DIAGNOSIS — E55.9 VITAMIN D DEFICIENCY: ICD-10-CM

## 2021-08-05 DIAGNOSIS — R63.5 WEIGHT GAIN: ICD-10-CM

## 2021-08-05 PROCEDURE — 99214 OFFICE O/P EST MOD 30 MIN: CPT | Performed by: NURSE PRACTITIONER

## 2021-08-05 PROCEDURE — 3074F SYST BP LT 130 MM HG: CPT | Performed by: NURSE PRACTITIONER

## 2021-08-05 PROCEDURE — 3078F DIAST BP <80 MM HG: CPT | Performed by: NURSE PRACTITIONER

## 2021-08-05 PROCEDURE — 3008F BODY MASS INDEX DOCD: CPT | Performed by: NURSE PRACTITIONER

## 2021-08-05 RX ORDER — PHENTERMINE HYDROCHLORIDE 15 MG/1
15 CAPSULE ORAL EVERY MORNING
Qty: 30 CAPSULE | Refills: 2 | Status: SHIPPED | OUTPATIENT
Start: 2021-08-05 | End: 2021-11-18

## 2021-08-05 NOTE — PROGRESS NOTES
[address]  Dept: [phone]       Patient:  Glenn Suarez  :      1955  MRN:      [id number]    Chief Complaint:  Patient pres Encounters:  08/05/21 : 125 lb 8 oz (56.9 kg)  06/03/21 : 132 lb 3.2 oz (60 kg)  04/19/21 : 139 lb 1.6 oz (63.1 kg)  03/12/21 : 146 lb 12.8 oz (66.6 kg)  11/06/20 : 152 lb (68.9 kg)  09/10/20 : 135 lb (61.2 kg)      Patient Medications:    Current Outpatie Asked        Seat Belt: Not Asked        Self-Exams: Not Asked        Grew up on a farm: Not Asked        History of tanning: Not Asked        Outdoor occupation: Not Asked        Breast feeding: Not Asked        Reaction to local anesthetic: No    Social Grandfather    • Hypertension Sister 67   • Breast Cancer Neg    • Ovarian Cancer Neg      Initial Intake:   After dinner behavior: potato chips, saltine crackers  Night eating: in the past   Portion sizes:   Binge: BED 7-  Emotional:   Depression: Total P negative  Hematologic/lymphatic: negative  Musculoskeletal:negative  Neurological: positive for headaches  Behavioral/Psych: negative  Endocrine: prediabetes   All other systems were reviewed and are negative    Physical Exam:   General appearance: alert, HYPERTENSION: Blood pressure stable on the above medications.  No interval change in antihypertensive medication.      OVERWEIGHT/WEIGHT GAIN:  PREDIABETES:     Discussed starting weight: 146.8 lbs; BMI: 28.77; WC:   Patient's goal weight: 125 lbs   Brittni constipation, dry mouth, and anxiety among others. She understands that I will not call in the prescription for her; she has to have an appointment to have the medication refilled.  Must avoid pregnancy during use, counseled on adequate contraception during

## 2021-08-05 NOTE — TELEPHONE ENCOUNTER
Dr Danie Christine,   Please add a referral for weight management, the patient will see Halina Miguel every 2-3 months. Thank you.

## 2021-08-05 NOTE — PATIENT INSTRUCTIONS
Add in psyllium husk 20 minutes before dinner with 10 oz water. [place] or [address] [place] [address]. Hold topiramate, repeat blood work as planned. Kidney function is likely reduced due to medication.

## 2021-08-24 RX ORDER — ZOLPIDEM TARTRATE 5 MG/1
5 TABLET ORAL NIGHTLY
Qty: 30 TABLET | Refills: 0 | Status: SHIPPED | OUTPATIENT
Start: 2021-08-24 | End: 2021-09-07

## 2021-08-24 NOTE — TELEPHONE ENCOUNTER
Please review. Protocol failed or does not have a protocol.      Requested Prescriptions   Pending Prescriptions Disp Refills    ZOLPIDEM 5 MG Oral Tab [Pharmacy Med Name: ZOLPIDEM 5MG TABLETS] 30 tablet 0     Sig: TAKE 1 TABLET(5 MG) BY MOUTH EVERY NIGHT

## 2021-08-25 ENCOUNTER — HOSPITAL ENCOUNTER (OUTPATIENT)
Dept: ULTRASOUND IMAGING | Age: 66
Discharge: HOME | End: 2021-08-25
Attending: INTERNAL MEDICINE
Payer: MEDICARE

## 2021-08-25 DIAGNOSIS — N17.9 ACUTE RENAL FAILURE SUPERIMPOSED ON STAGE 3B CHRONIC KIDNEY DISEASE, UNSPECIFIED ACUTE RENAL FAILURE TYPE (HCC): ICD-10-CM

## 2021-08-25 DIAGNOSIS — N18.32 ACUTE RENAL FAILURE SUPERIMPOSED ON STAGE 3B CHRONIC KIDNEY DISEASE, UNSPECIFIED ACUTE RENAL FAILURE TYPE (HCC): ICD-10-CM

## 2021-08-25 PROCEDURE — 76770 US EXAM ABDO BACK WALL COMP: CPT | Performed by: INTERNAL MEDICINE

## 2021-08-28 ENCOUNTER — LAB ENCOUNTER (OUTPATIENT)
Dept: LAB | Age: 66
End: 2021-08-28
Attending: INTERNAL MEDICINE
Payer: MEDICARE

## 2021-08-28 DIAGNOSIS — N17.9 ACUTE RENAL FAILURE SUPERIMPOSED ON STAGE 3B CHRONIC KIDNEY DISEASE, UNSPECIFIED ACUTE RENAL FAILURE TYPE (HCC): ICD-10-CM

## 2021-08-28 DIAGNOSIS — I10 ESSENTIAL HYPERTENSION: ICD-10-CM

## 2021-08-28 DIAGNOSIS — N18.32 ACUTE RENAL FAILURE SUPERIMPOSED ON STAGE 3B CHRONIC KIDNEY DISEASE, UNSPECIFIED ACUTE RENAL FAILURE TYPE (HCC): ICD-10-CM

## 2021-08-28 LAB
ALBUMIN SERPL-MCNC: 4 G/DL (ref 3.4–5)
ANION GAP SERPL CALC-SCNC: 2 MMOL/L (ref 0–18)
BUN BLD-MCNC: 15 MG/DL (ref 7–18)
BUN/CREAT SERPL: 19.5 (ref 10–20)
CALCIUM BLD-MCNC: 9.1 MG/DL (ref 8.5–10.1)
CHLORIDE SERPL-SCNC: 108 MMOL/L (ref 98–112)
CO2 SERPL-SCNC: 31 MMOL/L (ref 21–32)
CREAT BLD-MCNC: 0.77 MG/DL
GLUCOSE BLD-MCNC: 107 MG/DL (ref 70–99)
OSMOLALITY SERPL CALC.SUM OF ELEC: 293 MOSM/KG (ref 275–295)
PHOSPHATE SERPL-MCNC: 3.1 MG/DL (ref 2.5–4.9)
POTASSIUM SERPL-SCNC: 3.5 MMOL/L (ref 3.5–5.1)
SODIUM SERPL-SCNC: 141 MMOL/L (ref 136–145)

## 2021-08-28 PROCEDURE — 36415 COLL VENOUS BLD VENIPUNCTURE: CPT

## 2021-08-28 PROCEDURE — 80069 RENAL FUNCTION PANEL: CPT

## 2021-09-03 RX ORDER — FOSINOPRIL SODIUM AND HYDROCHLOROTHIAZIDE 20; 12.5 MG/1; MG/1
TABLET ORAL
Qty: 90 TABLET | Refills: 1 | Status: SHIPPED | OUTPATIENT
Start: 2021-09-03 | End: 2022-01-18

## 2021-09-03 RX ORDER — ALPRAZOLAM 0.25 MG/1
TABLET ORAL
Qty: 60 TABLET | Refills: 0 | Status: SHIPPED | OUTPATIENT
Start: 2021-09-03 | End: 2021-10-13

## 2021-09-07 RX ORDER — ATORVASTATIN CALCIUM 20 MG/1
20 TABLET, FILM COATED ORAL EVERY EVENING
Qty: 90 TABLET | Refills: 1 | Status: SHIPPED | OUTPATIENT
Start: 2021-09-07

## 2021-09-07 RX ORDER — ZOLPIDEM TARTRATE 5 MG/1
5 TABLET ORAL NIGHTLY
Qty: 30 TABLET | Refills: 1 | Status: SHIPPED | OUTPATIENT
Start: 2021-09-07

## 2021-09-07 NOTE — TELEPHONE ENCOUNTER
Current Outpatient Medications:     Current Outpatient Medications:   •  zolpidem 5 MG Oral Tab, Take 1 tablet (5 mg total) by mouth nightly., Disp: 30 tablet, Rfl: 0    Current Outpatient Medications:   •  ATORVASTATIN 20 MG Oral Tab, TAKE 1 TABLET BY M

## 2021-09-14 ENCOUNTER — OFFICE VISIT (OUTPATIENT)
Dept: INTERNAL MEDICINE CLINIC | Facility: CLINIC | Age: 66
End: 2021-09-14
Payer: MEDICARE

## 2021-09-14 VITALS
HEIGHT: 59 IN | DIASTOLIC BLOOD PRESSURE: 72 MMHG | HEART RATE: 72 BPM | WEIGHT: 125 LBS | SYSTOLIC BLOOD PRESSURE: 150 MMHG | BODY MASS INDEX: 25.2 KG/M2

## 2021-09-14 DIAGNOSIS — E78.5 HYPERLIPIDEMIA, UNSPECIFIED HYPERLIPIDEMIA TYPE: Chronic | ICD-10-CM

## 2021-09-14 DIAGNOSIS — I10 ESSENTIAL HYPERTENSION: Primary | ICD-10-CM

## 2021-09-14 DIAGNOSIS — R73.03 PREDIABETES: ICD-10-CM

## 2021-09-14 PROBLEM — E66.3 PATIENT OVERWEIGHT: Status: RESOLVED | Noted: 2021-03-12 | Resolved: 2021-09-14

## 2021-09-14 PROCEDURE — 3077F SYST BP >= 140 MM HG: CPT | Performed by: INTERNAL MEDICINE

## 2021-09-14 PROCEDURE — 3008F BODY MASS INDEX DOCD: CPT | Performed by: INTERNAL MEDICINE

## 2021-09-14 PROCEDURE — 99213 OFFICE O/P EST LOW 20 MIN: CPT | Performed by: INTERNAL MEDICINE

## 2021-09-14 PROCEDURE — 3078F DIAST BP <80 MM HG: CPT | Performed by: INTERNAL MEDICINE

## 2021-09-14 NOTE — PROGRESS NOTES
Subjective:     Patient ID: Martin Sapp is a 73year old female.     HPI    Discuss labs     GFR was temporily low  No hx of CKD           Headache no  dizziness        no                             Blurred vision no  palpitaionsSyncope no  Chest pain murmur    • Measles    • Osteoarthritis    • Other and unspecified hyperlipidemia    • Unspecified essential hypertension    • Visual impairment     glasses      Past Surgical History:   Procedure Laterality Date   • COLONOSCOPY  2006   • COLONOSCOPY     • most recent test results reviewed  Refill medicaitons  as needed  Potential side effect discussed  Modification of risk for CAD advised    Dietary an lifestyle change  Pt voiced understanding and agrees with plan  Pt given time to ask questions  After Visi

## 2021-10-13 RX ORDER — ALPRAZOLAM 0.25 MG/1
TABLET ORAL
Qty: 60 TABLET | Refills: 0 | Status: SHIPPED | OUTPATIENT
Start: 2021-10-13 | End: 2021-11-18

## 2021-10-13 NOTE — TELEPHONE ENCOUNTER
Protocol failed or has No Protocol, please review  Requested Prescriptions   Pending Prescriptions Disp Refills    ALPRAZOLAM 0.25 MG Oral Tab [Pharmacy Med Name: ALPRAZOLAM 0.25MG TABLETS] 60 tablet 0     Sig: TAKE 1 TABLET(0.25 MG) BY MOUTH TWICE DAILY

## 2021-10-14 ENCOUNTER — OFFICE VISIT (OUTPATIENT)
Dept: SURGERY | Facility: CLINIC | Age: 66
End: 2021-10-14
Payer: MEDICARE

## 2021-10-14 VITALS
DIASTOLIC BLOOD PRESSURE: 68 MMHG | HEIGHT: 59.9 IN | HEART RATE: 77 BPM | WEIGHT: 127.38 LBS | OXYGEN SATURATION: 96 % | SYSTOLIC BLOOD PRESSURE: 120 MMHG | BODY MASS INDEX: 25.01 KG/M2

## 2021-10-14 DIAGNOSIS — E78.5 HYPERLIPIDEMIA, UNSPECIFIED HYPERLIPIDEMIA TYPE: ICD-10-CM

## 2021-10-14 DIAGNOSIS — M19.012 PRIMARY OSTEOARTHRITIS OF LEFT SHOULDER: ICD-10-CM

## 2021-10-14 DIAGNOSIS — F41.1 ANXIETY STATE: ICD-10-CM

## 2021-10-14 DIAGNOSIS — I10 ESSENTIAL HYPERTENSION: Primary | ICD-10-CM

## 2021-10-14 DIAGNOSIS — E55.9 VITAMIN D DEFICIENCY: ICD-10-CM

## 2021-10-14 DIAGNOSIS — R73.03 PREDIABETES: ICD-10-CM

## 2021-10-14 DIAGNOSIS — R63.5 WEIGHT GAIN: ICD-10-CM

## 2021-10-14 PROCEDURE — 3074F SYST BP LT 130 MM HG: CPT | Performed by: NURSE PRACTITIONER

## 2021-10-14 PROCEDURE — 99214 OFFICE O/P EST MOD 30 MIN: CPT | Performed by: NURSE PRACTITIONER

## 2021-10-14 PROCEDURE — 3008F BODY MASS INDEX DOCD: CPT | Performed by: NURSE PRACTITIONER

## 2021-10-14 PROCEDURE — 3078F DIAST BP <80 MM HG: CPT | Performed by: NURSE PRACTITIONER

## 2021-10-14 NOTE — PROGRESS NOTES
[address]  Dept: [phone]       Patient:  Jose Broussard  :      1955  MRN:      [id number]    Chief Complaint:  Patient pres loss: +02   Total weight loss: -19   Start weight: 146    Wt Readings from Last 6 Encounters:  10/14/21 : 127 lb 6.4 oz (57.8 kg)  09/14/21 : 125 lb (56.7 kg)  08/05/21 : 125 lb 8 oz (56.9 kg)  06/03/21 : 132 lb 3.2 oz (60 kg)  04/19/21 : 139 lb 1.6 oz (63 Special Diet: Not Asked        Back Care: Not Asked        Exercise: Not Asked        Bike Helmet: Not Asked        Seat Belt: Not Asked        Self-Exams: Not Asked        Grew up on a farm: Not Asked        History of tanning: Not Asked        Outdoor • OTHER  2020    left shoulder replacement    • OTHER SURGICAL HISTORY      breast biopsy    • THYROIDECTOMY     • UPPER GI ENDOSCOPY PERFORMED  5/2015     Family History:    Family History   Problem Relation Age of Onset   • Diabetes Sister    • Hyperte breakfast, Eat 3 meals per day, Plan meals in advance, Read nutrition labels, Drink 64 oz of water per day, Maintain a daily food journal, Utlize portion control strategies to reduce calorie intake, Identify triggers for eating and manage cues and Eat slow pursue medical weight loss at this time. DYSLIPIDEMIA: with hypertriglyceridemia- Recommend dietary changes and lifestyle modifications as discussed below. Monitor.      Lab Results   Component Value Date/Time    CHOLEST 198 08/03/2021 04:59 PM    LDL 10 detail with patient.      Denies hx cardiac disease or substance abuse.   Denies hx renal stone.      Continue phentermine 15 mg by mouth in the AM. Consider dose increase if EKG normal.      Continue to hold topiramate 25 mg, reduced eGFR and increased cre

## 2021-10-14 NOTE — PATIENT INSTRUCTIONS
Add in psyllium husk 20 minutes before dinner with 10 oz water. [place] or [address] [place] [address]. I recommend a whole food, plant powered diet with low glycemic index:     Aim for 2-3 meals a day and 1-2 snacks as needed. Breakfast ideas:  1.  Fruit and nu

## 2021-11-05 RX ORDER — AMLODIPINE BESYLATE 5 MG/1
TABLET ORAL
Qty: 90 TABLET | Refills: 3 | Status: SHIPPED | OUTPATIENT
Start: 2021-11-05

## 2021-11-11 NOTE — TELEPHONE ENCOUNTER
3rd attempt-left detailed message to call if need further assist
LMTCB    Please transfer to RN Triage. Thank you.
LMTCB, transfer to triage RN
Patient indicated that had a sharp pain in her left hand going from the 5th finger down to the wrist and felt a hard spot in the palm of her hand for 2-3 weeks.  Patient did have carpal tunnel surgery on that hand so not sure if scar tissue is in the palm o
Received call from patient's  named Sumeet Dewey who is not on the patient's HIPAA. Sumeet Dewey reports patient is having problems with her arm but she is currently at work now and cannot call herself.   was instructed to have patient return call to clinic
noted
Patient/Caregiver provided printed discharge information.

## 2021-11-18 RX ORDER — ALPRAZOLAM 0.25 MG/1
TABLET ORAL
Qty: 60 TABLET | Refills: 0 | Status: SHIPPED | OUTPATIENT
Start: 2021-11-18 | End: 2021-12-28

## 2021-11-18 RX ORDER — PHENTERMINE HYDROCHLORIDE 15 MG/1
15 CAPSULE ORAL EVERY MORNING
Qty: 30 CAPSULE | Refills: 1 | Status: SHIPPED | OUTPATIENT
Start: 2021-11-18 | End: 2022-01-10

## 2021-12-28 RX ORDER — ALPRAZOLAM 0.25 MG/1
TABLET ORAL
Qty: 60 TABLET | Refills: 0 | Status: SHIPPED | OUTPATIENT
Start: 2021-12-28 | End: 2022-01-31

## 2022-01-12 RX ORDER — PHENTERMINE HYDROCHLORIDE 15 MG/1
15 CAPSULE ORAL EVERY MORNING
Qty: 30 CAPSULE | Refills: 0 | Status: SHIPPED | OUTPATIENT
Start: 2022-01-12 | End: 2022-03-18

## 2022-01-18 RX ORDER — FOSINOPRIL SODIUM AND HYDROCHLOROTHIAZIDE 20; 12.5 MG/1; MG/1
1 TABLET ORAL DAILY
Qty: 90 TABLET | Refills: 1 | Status: SHIPPED | OUTPATIENT
Start: 2022-01-18 | End: 2022-07-18

## 2022-01-18 NOTE — TELEPHONE ENCOUNTER
Refill passed per [facility] protocol.   Requested Prescriptions   Pending Prescriptions Disp Refills    FOSINOPRIL SODIUM-HCTZ 20-12.5 MG Oral Tab [Pharmacy Med Name: FOSINOPRIL-HCTZ 20MG/12.5MG TABLETS] 90 tablet 1     Sig: TAKE 1 TABLET BY MOUTH EVERY DAY        Hypertensive Medications Protocol Passed - 1/18/2022  5:50 AM        Passed - CMP or BMP in past 12 months        Passed - Appointment in past 6 or next 3 months        Passed - GFR Non- > 50     Lab Results   Component Value Date    GFRNAA 81 08/28/2021                      Recent Outpatient Visits              3 months ago Essential hypertension    [address], [employer], [place], Violeta, APRN    Office Visit    4 months ago Essential hypertension    [facility], [facility], [address], Maria Del Rosario Sweeney MD    Office Visit    5 months ago Essential hypertension    [address], [employer], [place], Violeta, APRN    Office Visit    7 months ago Essential hypertension    [address], [facility], [place], Violeta, APRN    Office Visit    9 months ago Essential hypertension    [address], [facility], [place], Jose C Powell, APRN    Office Visit           Future Appointments         Provider Department Appt Notes    In 1 month Calvin Simpson, [address] [place], [employer], [employer]

## 2022-01-31 RX ORDER — ALPRAZOLAM 0.25 MG/1
0.25 TABLET ORAL 2 TIMES DAILY
Qty: 60 TABLET | Refills: 0 | Status: SHIPPED | OUTPATIENT
Start: 2022-01-31

## 2022-01-31 NOTE — TELEPHONE ENCOUNTER
Please review refill protocol failed/ no protocol  Requested Prescriptions   Pending Prescriptions Disp Refills    ALPRAZolam 0.25 MG Oral Tab 60 tablet 0     Sig: Take 1 tablet (0.25 mg total) by mouth 2 (two) times daily.         There is no refill protoc

## 2022-02-15 RX ORDER — ZOLPIDEM TARTRATE 5 MG/1
5 TABLET ORAL NIGHTLY
Qty: 30 TABLET | Refills: 0 | Status: SHIPPED | OUTPATIENT
Start: 2022-02-15 | End: 2022-07-21

## 2022-02-15 NOTE — TELEPHONE ENCOUNTER
Please review refill protocol failed/ no protocol  Requested Prescriptions   Pending Prescriptions Disp Refills    ZOLPIDEM 5 MG Oral Tab [Pharmacy Med Name: ZOLPIDEM 5MG TABLETS] 30 tablet 0     Sig: TAKE 1 TABLET(5 MG) BY MOUTH EVERY NIGHT        There is no refill protocol information for this order

## 2022-03-05 RX ORDER — ALPRAZOLAM 0.25 MG/1
0.25 TABLET ORAL 2 TIMES DAILY
Qty: 60 TABLET | Refills: 0 | Status: SHIPPED | OUTPATIENT
Start: 2022-03-05

## 2022-03-05 NOTE — TELEPHONE ENCOUNTER
Please review refill protocol failed/ no protocol  Requested Prescriptions   Pending Prescriptions Disp Refills    ALPRAZolam 0.25 MG Oral Tab 60 tablet 0     Sig: Take 1 tablet (0.25 mg total) by mouth 2 (two) times daily.         There is no refill protocol information for this order

## 2022-03-18 RX ORDER — PHENTERMINE HYDROCHLORIDE 15 MG/1
15 CAPSULE ORAL EVERY MORNING
Qty: 14 CAPSULE | Refills: 0 | Status: SHIPPED | OUTPATIENT
Start: 2022-03-18 | End: 2022-07-07

## 2022-03-19 RX ORDER — ATORVASTATIN CALCIUM 20 MG/1
TABLET, FILM COATED ORAL
Qty: 90 TABLET | Refills: 1 | Status: SHIPPED | OUTPATIENT
Start: 2022-03-19

## 2022-03-19 NOTE — TELEPHONE ENCOUNTER
Refill passed per [address] protocol.     Requested Prescriptions   Pending Prescriptions Disp Refills    ATORVASTATIN 20 MG Oral Tab [Pharmacy Med Name: ATORVASTATIN 20MG TABLETS] 90 tablet 1     Sig: TAKE 1 TABLET(20 MG) BY MOUTH EVERY EVENING        Cholesterol Medication Protocol Passed - 3/19/2022  5:49 AM        Passed - ALT in past 12 months        Passed - LDL in past 12 months        Passed - Last ALT < 80       Lab Results   Component Value Date    ALT 32 08/03/2021             Passed - Last LDL < 130     Lab Results   Component Value Date     (H) 08/03/2021               Passed - Appointment in past 12 or next 3 months            Recent Outpatient Visits              5 months ago Essential hypertension    [address], [address], Violeta Ravi, APRN    Office Visit    6 months ago Essential hypertension    [address], [facility] GlennJose Elias MD    Office Visit    7 months ago Essential hypertension    [address], [address], Hayley Ravia, APRN    Office Visit    9 months ago Essential hypertension    [address], [address], [place], [place], APRN    Office Visit    11 months ago Essential hypertension    [address], [address], [place], [place], APRN    Office Visit
normal...

## 2022-04-04 NOTE — TELEPHONE ENCOUNTER
Please review. Protocol failed / No protocol.     Requested Prescriptions   Pending Prescriptions Disp Refills    ALPRAZOLAM 0.25 MG Oral Tab [Pharmacy Med Name: ALPRAZOLAM 0.25MG TABLETS] 60 tablet 0     Sig: TAKE 1 TABLET(0.25 MG) BY MOUTH TWICE DAILY        There is no refill protocol information for this order               Recent Outpatient Visits              5 months ago Essential hypertension    [facility], [address], Violeta Ravi, [employer] Visit    6 months ago Essential hypertension    [facility], [facility], [address] Aguilar MD    Office Visit    8 months ago Essential hypertension    [facility] [employer], [address], Violeta Ravi, APRN    Office Visit    10 months ago Essential hypertension    [facility], [address], Raghavendra Ravi, APRROSA    Office Visit    11 months ago Essential hypertension    [facility] [employer], [address], Raghavendra Ravi, APRN    Office Visit

## 2022-04-05 RX ORDER — ALPRAZOLAM 0.25 MG/1
0.25 TABLET ORAL 2 TIMES DAILY
Qty: 60 TABLET | Refills: 0 | Status: SHIPPED | OUTPATIENT
Start: 2022-04-05 | End: 2022-05-10

## 2022-05-10 RX ORDER — ALPRAZOLAM 0.25 MG/1
0.25 TABLET ORAL 2 TIMES DAILY
Qty: 60 TABLET | Refills: 0 | Status: SHIPPED | OUTPATIENT
Start: 2022-05-10

## 2022-05-10 NOTE — TELEPHONE ENCOUNTER
Please review. Protocol Failed or has no Protocol  Requested Prescriptions   Pending Prescriptions Disp Refills    ALPRAZolam 0.25 MG Oral Tab 60 tablet 0     Sig: Take 1 tablet (0.25 mg total) by mouth 2 (two) times daily.         There is no refill protocol information for this order           Recent Outpatient Visits              6 months ago Essential hypertension    [address], [address], Jared Ravi, [employer] Visit    7 months ago Essential hypertension    [facility], [facility], Tate Wheatley MD    Office Visit    9 months ago Essential hypertension    [address], [address], Jared Ravi APRN    Office Visit    11 months ago Essential hypertension    [address], [address], Jared Ravi APRN    Office Visit    1 year ago Essential hypertension    [address], [address], Jared Ravi APRN    Office Visit

## 2022-05-12 ENCOUNTER — TELEPHONE (OUTPATIENT)
Dept: CARDIOLOGY CLINIC | Facility: CLINIC | Age: 67
End: 2022-05-12

## 2022-05-12 NOTE — TELEPHONE ENCOUNTER
Called pt to schedule her MA Super visit. Noticed she did not read email from 5/2/22 also requesting the same.

## 2022-06-01 RX ORDER — PHENTERMINE HYDROCHLORIDE 15 MG/1
15 CAPSULE ORAL EVERY MORNING
Qty: 14 CAPSULE | Refills: 0 | OUTPATIENT
Start: 2022-06-01

## 2022-06-03 RX ORDER — ATORVASTATIN CALCIUM 20 MG/1
20 TABLET, FILM COATED ORAL EVERY EVENING
Qty: 90 TABLET | Refills: 1 | Status: SHIPPED | OUTPATIENT
Start: 2022-06-03

## 2022-06-03 RX ORDER — PHENTERMINE HYDROCHLORIDE 15 MG/1
15 CAPSULE ORAL EVERY MORNING
Qty: 14 CAPSULE | Refills: 0 | OUTPATIENT
Start: 2022-06-03

## 2022-06-03 NOTE — TELEPHONE ENCOUNTER
Refill passed per [address] protocol. Requested Prescriptions   Pending Prescriptions Disp Refills    atorvastatin 20 MG Oral Tab 90 tablet 1     Sig: Take 1 tablet (20 mg total) by mouth every evening.         Cholesterol Medication Protocol Passed - 6/3/2022  9:30 AM        Passed - ALT in past 12 months        Passed - LDL in past 12 months        Passed - Last ALT < 80       Lab Results   Component Value Date    ALT 32 08/03/2021             Passed - Last LDL < 130     Lab Results   Component Value Date     (H) 08/03/2021               Passed - Appointment in past 12 or next 3 months             Recent Outpatient Visits              7 months ago Essential hypertension    [facility] [employer], [address], Violeta Ravi, [employer] Visit    8 months ago Essential hypertension    [address], [address] Aguilar MD    Office Visit    10 months ago Essential hypertension    [facility] [employer], [address], Violeta Ravi, APRN    Office Visit    1 year ago Essential hypertension    [facility] [employer], [address], Nael Ravi, APRN    Office Visit    1 year ago Essential hypertension    [facility] [employer], [address], Nael Ravi, APRN    Office Visit

## 2022-06-03 NOTE — TELEPHONE ENCOUNTER
Refill passed per [facility] protocol. Requested Prescriptions   Pending Prescriptions Disp Refills    atorvastatin 20 MG Oral Tab 90 tablet 1     Sig: Take 1 tablet (20 mg total) by mouth every evening.         Cholesterol Medication Protocol Passed - 6/3/2022  9:30 AM        Passed - ALT in past 12 months        Passed - LDL in past 12 months        Passed - Last ALT < 80       Lab Results   Component Value Date    ALT 32 08/03/2021             Passed - Last LDL < 130     Lab Results   Component Value Date     (H) 08/03/2021               Passed - Appointment in past 12 or next 3 months             Recent Outpatient Visits              7 months ago Essential hypertension    [employer], [address], Violeta Ravi, [employer] Visit    8 months ago Essential hypertension    [facility], [facility], [address] Rosario Aguilar MD    Office Visit    10 months ago Essential hypertension    [employer], [address], Violeta Ravi, APRN    Office Visit    1 year ago Essential hypertension    [facility] [employer], [address], Lela Ravi APRN    Office Visit    1 year ago Essential hypertension    [facility] [employer], [address], Lela Ravi APRROSA    Office Visit

## 2022-06-13 RX ORDER — ALPRAZOLAM 0.25 MG/1
0.25 TABLET ORAL 2 TIMES DAILY
Qty: 60 TABLET | Refills: 0 | Status: SHIPPED | OUTPATIENT
Start: 2022-06-13

## 2022-07-07 ENCOUNTER — OFFICE VISIT (OUTPATIENT)
Dept: SURGERY | Facility: CLINIC | Age: 67
End: 2022-07-07
Payer: MEDICARE

## 2022-07-07 VITALS
OXYGEN SATURATION: 98 % | HEART RATE: 83 BPM | SYSTOLIC BLOOD PRESSURE: 132 MMHG | BODY MASS INDEX: 26.31 KG/M2 | HEIGHT: 59.9 IN | WEIGHT: 134 LBS | DIASTOLIC BLOOD PRESSURE: 80 MMHG

## 2022-07-07 DIAGNOSIS — M19.012 PRIMARY OSTEOARTHRITIS OF LEFT SHOULDER: ICD-10-CM

## 2022-07-07 DIAGNOSIS — R73.03 PREDIABETES: ICD-10-CM

## 2022-07-07 DIAGNOSIS — E66.3 PATIENT OVERWEIGHT: ICD-10-CM

## 2022-07-07 DIAGNOSIS — F41.1 ANXIETY STATE: ICD-10-CM

## 2022-07-07 DIAGNOSIS — E78.5 HYPERLIPIDEMIA, UNSPECIFIED HYPERLIPIDEMIA TYPE: ICD-10-CM

## 2022-07-07 DIAGNOSIS — E55.9 VITAMIN D DEFICIENCY: ICD-10-CM

## 2022-07-07 DIAGNOSIS — R63.5 WEIGHT GAIN: ICD-10-CM

## 2022-07-07 DIAGNOSIS — I10 ESSENTIAL HYPERTENSION: Primary | ICD-10-CM

## 2022-07-07 PROCEDURE — 3079F DIAST BP 80-89 MM HG: CPT | Performed by: NURSE PRACTITIONER

## 2022-07-07 PROCEDURE — 3008F BODY MASS INDEX DOCD: CPT | Performed by: NURSE PRACTITIONER

## 2022-07-07 PROCEDURE — 99214 OFFICE O/P EST MOD 30 MIN: CPT | Performed by: NURSE PRACTITIONER

## 2022-07-07 PROCEDURE — 3075F SYST BP GE 130 - 139MM HG: CPT | Performed by: NURSE PRACTITIONER

## 2022-07-07 RX ORDER — DIETHYLPROPION HYDROCHLORIDE 75 MG/1
1 TABLET ORAL EVERY MORNING
Qty: 30 TABLET | Refills: 2 | Status: SHIPPED | OUTPATIENT
Start: 2022-07-07

## 2022-07-07 NOTE — PATIENT INSTRUCTIONS
Walk 2 miles, 3 days/week     Carb Manager  grams carbs/day. I recommend a whole food, plant powered diet with low glycemic index:     Aim for 3 meals a day and 1-2 snacks as needed. Aim for a protein + produce at each meal time. Keep meals between 7 am and 7 pm.     Breakfast ideas:  1. Fruit and nuts/seeds. 2. Eggs scrambled with vegetables, cottage cheese. 3. Oatmeal (stovetop), cinnamon, 2 tbsp flaxseed, berries. 4. Protein shake + fruit. (1 scoop with water/ice). Dakota Millan is a good brand. Snacks:  Raw vegetables and hummus, apples and peanut butter, nuts, seeds, fruit. Use the Healthy Plate method for lunch and dinner:  1/2 right side of plate non-starchy vegetables. Bottom left 1/4 plate protein. Top left 1/4 starch as desired. Aim for a total of:  2 fruits a day (avocado, tomato, citrus/oranges, apples, berries)  4 non-starchy vegetables (handful) (greens, peppers, onions, garlic, broccoli, cauliflower, etc.)  0-2 starches (oatmeal, sweet potato, carrots, brown rice, etc). 3 protein per day (fish, seafood, meat, or plants: salmon, nuts, seeds, shrimp, chicken, turkey, beans, lentils, chickpeas, etc).   2 healthy fats (avocado, avocado oil, olives, olive oil, salmon, nuts, seeds)

## 2022-07-13 ENCOUNTER — EKG ENCOUNTER (OUTPATIENT)
Dept: LAB | Facility: HOSPITAL | Age: 67
End: 2022-07-13
Attending: NURSE PRACTITIONER
Payer: MEDICARE

## 2022-07-13 DIAGNOSIS — E78.5 HYPERLIPIDEMIA, UNSPECIFIED HYPERLIPIDEMIA TYPE: ICD-10-CM

## 2022-07-13 DIAGNOSIS — R63.5 WEIGHT GAIN: ICD-10-CM

## 2022-07-13 DIAGNOSIS — M19.012 PRIMARY OSTEOARTHRITIS OF LEFT SHOULDER: ICD-10-CM

## 2022-07-13 DIAGNOSIS — R73.03 PREDIABETES: ICD-10-CM

## 2022-07-13 DIAGNOSIS — I10 ESSENTIAL HYPERTENSION: ICD-10-CM

## 2022-07-13 DIAGNOSIS — E66.3 PATIENT OVERWEIGHT: ICD-10-CM

## 2022-07-13 DIAGNOSIS — F41.1 ANXIETY STATE: ICD-10-CM

## 2022-07-13 DIAGNOSIS — E55.9 VITAMIN D DEFICIENCY: ICD-10-CM

## 2022-07-13 PROCEDURE — 93010 ELECTROCARDIOGRAM REPORT: CPT | Performed by: NURSE PRACTITIONER

## 2022-07-13 PROCEDURE — 93005 ELECTROCARDIOGRAM TRACING: CPT

## 2022-07-13 RX ORDER — ALPRAZOLAM 0.25 MG/1
0.25 TABLET ORAL 2 TIMES DAILY
Qty: 60 TABLET | Refills: 0 | Status: SHIPPED | OUTPATIENT
Start: 2022-07-13

## 2022-07-18 RX ORDER — FOSINOPRIL SODIUM AND HYDROCHLOROTHIAZIDE 20; 12.5 MG/1; MG/1
1 TABLET ORAL DAILY
Qty: 90 TABLET | Refills: 1 | Status: SHIPPED | OUTPATIENT
Start: 2022-07-18

## 2022-07-24 RX ORDER — ZOLPIDEM TARTRATE 5 MG/1
5 TABLET ORAL NIGHTLY
Qty: 30 TABLET | Refills: 0 | Status: SHIPPED | OUTPATIENT
Start: 2022-07-24

## 2022-08-11 ENCOUNTER — HOSPITAL ENCOUNTER (OUTPATIENT)
Dept: GENERAL RADIOLOGY | Facility: HOSPITAL | Age: 67
Discharge: HOME | End: 2022-08-11
Attending: ORTHOPAEDIC SURGERY
Payer: MEDICARE

## 2022-08-11 ENCOUNTER — OFFICE VISIT (OUTPATIENT)
Dept: ORTHOPEDICS CLINIC | Facility: CLINIC | Age: 67
End: 2022-08-11
Payer: MEDICARE

## 2022-08-11 DIAGNOSIS — M25.511 ACUTE PAIN OF RIGHT SHOULDER: ICD-10-CM

## 2022-08-11 DIAGNOSIS — M19.011 GLENOHUMERAL ARTHRITIS, RIGHT: Primary | ICD-10-CM

## 2022-08-11 PROCEDURE — 73030 X-RAY EXAM OF SHOULDER: CPT | Performed by: ORTHOPAEDIC SURGERY

## 2022-08-11 PROCEDURE — 99214 OFFICE O/P EST MOD 30 MIN: CPT | Performed by: ORTHOPAEDIC SURGERY

## 2022-08-14 RX ORDER — ALPRAZOLAM 0.25 MG/1
0.25 TABLET ORAL 2 TIMES DAILY
Qty: 60 TABLET | Refills: 0 | Status: SHIPPED | OUTPATIENT
Start: 2022-08-14

## 2022-08-26 RX ORDER — AMLODIPINE BESYLATE 5 MG/1
5 TABLET ORAL DAILY
Qty: 90 TABLET | Refills: 3 | Status: SHIPPED | OUTPATIENT
Start: 2022-08-26

## 2022-08-26 NOTE — TELEPHONE ENCOUNTER
Please review; protocol failed/No Protcol    Requested Prescriptions   Pending Prescriptions Disp Refills    amLODIPine 5 MG Oral Tab 90 tablet 3     Sig: Take 1 tablet (5 mg total) by mouth daily. Hypertensive Medications Protocol Failed - 8/26/2022 10:56 AM        Failed - CMP or BMP in past 6 months     No results found for this or any previous visit (from the past 4392 hour(s)).               Failed - In person appointment or virtual visit in the past 6 months       Recent Outpatient Visits              2 weeks ago Glenohumeral arthritis, [facility] for Calin Bryant MD    Office Visit    1 month ago Essential hypertension    [employer], [address], Cortes Ravi APRN    Office Visit    10 months ago Essential hypertension    [employer], [address], Cortes Ravi APRN    Office Visit    11 months ago Essential hypertension    Matthew Batista MD    Office Visit    1 year ago Essential hypertension    [employer], [address], Cortes Ravi APRN    Office Visit     Future Appointments         Provider Department Appt Notes    In 3 weeks [place], [address] [place], [address], [place] HU[employer] GOLD   NON SX F/U               Passed - In person appointment in the past 12 or next 3 months       Recent Outpatient Visits              2 weeks ago Glenomeral arthritis, [facility] for Calin Bryant MD    Office Visit    1 month ago Essential hypertension    [employer], [address], Cortes Ravi APRN    Office Visit    10 months ago Essential hypertension    [employer], [address], Cortes Ravi APRN    Office Visit    11 months ago Essential hypertension    [address] Claudia Guerrero MD    Office Visit    1 year ago Essential hypertension    [address], [address], Wally Ravi, APRROSA    Office Visit     Future Appointments         Provider Department Appt Notes    In 3 weeks TETO Jarquin [address], [address], [place] HUMA[employer] GOLD   NON SX F/U               Passed - Last BP reading less than 140/90     BP Readings from Last 1 Encounters:  07/07/22 : 132/80                Passed - GFR > 50     No results found for: [facility]                    Recent Outpatient Visits              2 weeks ago Glenohumeral arthritis, right [facility], [address], Sadi Amor MD    Office Visit    1 month ago Essential hypertension    [address], [address], Wally Ravi, APRROSA    Office Visit    10 months ago Essential hypertension    [address], [address], Violeta Ravi, APRROSA    Office Visit    11 months ago Essential hypertension    Jewell Lawrence MD    Office Visit    1 year ago Essential hypertension    [address], [address], Wally Ravi, APRROSA    Office Visit            Future Appointments         Provider Department Appt Notes    In 3 weeks TETO Jarquin [address], [address], [place] HU[employer] GOLD   NON SX F/U

## 2022-08-30 ENCOUNTER — TELEPHONE (OUTPATIENT)
Dept: INTERNAL MEDICINE CLINIC | Facility: CLINIC | Age: 67
End: 2022-08-30

## 2022-08-30 NOTE — TELEPHONE ENCOUNTER
Called pt to schedule MA Super visit ([facility]) as she has not seen Dr. Cristina Gutierrez this year. She needs the visit to continue medication refills and avoid care gaps.

## 2022-09-10 RX ORDER — ATORVASTATIN CALCIUM 20 MG/1
20 TABLET, FILM COATED ORAL EVERY EVENING
Qty: 90 TABLET | Refills: 1 | Status: SHIPPED | OUTPATIENT
Start: 2022-09-10

## 2022-09-10 NOTE — TELEPHONE ENCOUNTER
Protocol failed or has No Protocol, please review  Requested Prescriptions   Pending Prescriptions Disp Refills    atorvastatin 20 MG Oral Tab 90 tablet 1     Sig: Take 1 tablet (20 mg total) by mouth every evening. Cholesterol Medication Protocol Failed - 9/10/2022  7:24 AM        Failed - ALT in past 12 months        Failed - LDL in past 12 months        Failed - Last ALT < 80       Lab Results   Component Value Date    ALT 32 08/03/2021             Failed - Last LDL < 130     Lab Results   Component Value Date     (H) 08/03/2021               Passed - In person appointment or virtual visit in the past 12 mos or appointment in next 3 mos       Recent Outpatient Visits              1 month ago Glenohumeral arthritis, [facility] [place] [facility] for Hasmukh Ink, Meka Weldon MD    Office Visit    2 months ago Essential hypertension    [employer], [address], Sanjana Ravi, APRROSA    Office Visit    11 months ago Essential hypertension    [employer], [address], Violeta Ravi APRN    Office Visit    12 months ago Essential hypertension    Cheryle Mech, MD    Office Visit    1 year ago Essential hypertension    [employer], [address], Sanjana Ravi, APRROSA    Office Visit     Future Appointments         Provider Department Appt Notes    In 1 week Lyle Vasquez, [address] [place], [address], [place] HUMANA GOLD   NON [address] F/U    In 2 weeks Oseas Calderon, [facility] I want to get my required yearly Medicare Physical at this appointment.                    Future Appointments         Provider Department Appt Notes    In 1 week Lyle Vasquez, [facility], [address], [place] [employer]MA[employer] GOLD   NON SX F/U    In 2 weeks Oseas Calderon MD [facility], [facility], [facility], [place] I want to get my required yearly Medicare Physical at this appointment.           Recent Outpatient Visits              1 month ago Glenohumeral arthritis, right    [facility] for [place], Génesis Mejia MD    Office Visit    2 months ago Essential hypertension    [employer], [address], Jaylene Ravi, [employer] Visit    11 months ago Essential hypertension    [employer], [address], Violeta Ravi, TETO    Office Visit    12 months ago Essential hypertension    Ari Arredondo MD    Office Visit    1 year ago Essential hypertension    [employer], [address], Jaylene Ravi, APRN    Office Visit

## 2022-09-13 RX ORDER — ALPRAZOLAM 0.25 MG/1
0.25 TABLET ORAL 2 TIMES DAILY
Qty: 60 TABLET | Refills: 0 | Status: SHIPPED | OUTPATIENT
Start: 2022-09-13

## 2022-09-29 ENCOUNTER — OFFICE VISIT (OUTPATIENT)
Dept: INTERNAL MEDICINE CLINIC | Facility: CLINIC | Age: 67
End: 2022-09-29

## 2022-09-29 VITALS
HEART RATE: 76 BPM | HEIGHT: 60 IN | DIASTOLIC BLOOD PRESSURE: 76 MMHG | SYSTOLIC BLOOD PRESSURE: 128 MMHG | BODY MASS INDEX: 26.31 KG/M2 | WEIGHT: 134 LBS

## 2022-09-29 DIAGNOSIS — Z78.0 POSTMENOPAUSAL: ICD-10-CM

## 2022-09-29 DIAGNOSIS — E78.5 HYPERLIPIDEMIA, UNSPECIFIED HYPERLIPIDEMIA TYPE: ICD-10-CM

## 2022-09-29 DIAGNOSIS — Z00.00 MEDICARE ANNUAL WELLNESS VISIT, SUBSEQUENT: Primary | ICD-10-CM

## 2022-09-29 DIAGNOSIS — I10 ESSENTIAL HYPERTENSION: ICD-10-CM

## 2022-09-29 DIAGNOSIS — Z12.31 VISIT FOR SCREENING MAMMOGRAM: ICD-10-CM

## 2022-09-29 DIAGNOSIS — R73.03 PREDIABETES: ICD-10-CM

## 2022-09-29 DIAGNOSIS — Z00.00 ENCOUNTER FOR ANNUAL HEALTH EXAMINATION: ICD-10-CM

## 2022-09-29 PROBLEM — E66.3 PATIENT OVERWEIGHT: Status: RESOLVED | Noted: 2021-03-12 | Resolved: 2022-09-29

## 2022-09-29 PROBLEM — M19.012 PRIMARY OSTEOARTHRITIS OF LEFT SHOULDER: Status: RESOLVED | Noted: 2020-07-13 | Resolved: 2022-09-29

## 2022-09-29 PROBLEM — E55.9 VITAMIN D DEFICIENCY: Status: RESOLVED | Noted: 2021-03-12 | Resolved: 2022-09-29

## 2022-09-29 PROBLEM — R63.5 WEIGHT GAIN: Status: RESOLVED | Noted: 2021-03-12 | Resolved: 2022-09-29

## 2022-09-29 PROBLEM — M50.30 DEGENERATION OF CERVICAL INTERVERTEBRAL DISC: Status: RESOLVED | Noted: 2019-03-25 | Resolved: 2022-09-29

## 2022-09-29 PROCEDURE — 96160 PT-FOCUSED HLTH RISK ASSMT: CPT | Performed by: INTERNAL MEDICINE

## 2022-09-29 PROCEDURE — 99397 PER PM REEVAL EST PAT 65+ YR: CPT | Performed by: INTERNAL MEDICINE

## 2022-09-29 PROCEDURE — 3074F SYST BP LT 130 MM HG: CPT | Performed by: INTERNAL MEDICINE

## 2022-09-29 PROCEDURE — 3008F BODY MASS INDEX DOCD: CPT | Performed by: INTERNAL MEDICINE

## 2022-09-29 PROCEDURE — 1125F AMNT PAIN NOTED PAIN PRSNT: CPT | Performed by: INTERNAL MEDICINE

## 2022-09-29 PROCEDURE — 3078F DIAST BP <80 MM HG: CPT | Performed by: INTERNAL MEDICINE

## 2022-09-29 PROCEDURE — G0402 INITIAL PREVENTIVE EXAM: HCPCS | Performed by: INTERNAL MEDICINE

## 2022-09-29 RX ORDER — FOSINOPRIL SODIUM AND HYDROCHLOROTHIAZIDE 20; 12.5 MG/1; MG/1
1 TABLET ORAL DAILY
Qty: 90 TABLET | Refills: 3 | Status: SHIPPED | OUTPATIENT
Start: 2022-09-29

## 2022-10-05 RX ORDER — FOSINOPRIL SODIUM AND HYDROCHLOROTHIAZIDE 20; 12.5 MG/1; MG/1
1 TABLET ORAL DAILY
Qty: 90 TABLET | Refills: 3 | Status: SHIPPED | OUTPATIENT
Start: 2022-10-05

## 2022-10-14 RX ORDER — ALPRAZOLAM 0.25 MG/1
0.25 TABLET ORAL 2 TIMES DAILY
Qty: 60 TABLET | Refills: 0 | Status: SHIPPED | OUTPATIENT
Start: 2022-10-14

## 2022-11-14 ENCOUNTER — HOSPITAL ENCOUNTER (OUTPATIENT)
Dept: MAMMOGRAPHY | Age: 67
Discharge: HOME | End: 2022-11-14
Attending: INTERNAL MEDICINE
Payer: MEDICARE

## 2022-11-14 DIAGNOSIS — Z12.31 VISIT FOR SCREENING MAMMOGRAM: ICD-10-CM

## 2022-11-14 PROCEDURE — 77063 BREAST TOMOSYNTHESIS BI: CPT | Performed by: INTERNAL MEDICINE

## 2022-11-14 PROCEDURE — 77067 SCR MAMMO BI INCL CAD: CPT | Performed by: INTERNAL MEDICINE

## 2022-11-14 RX ORDER — ALPRAZOLAM 0.25 MG/1
0.25 TABLET ORAL 2 TIMES DAILY
Qty: 60 TABLET | Refills: 0 | Status: SHIPPED | OUTPATIENT
Start: 2022-11-14

## 2022-11-16 ENCOUNTER — HOSPITAL ENCOUNTER (OUTPATIENT)
Dept: BONE DENSITY | Facility: HOSPITAL | Age: 67
Discharge: HOME | End: 2022-11-16
Attending: INTERNAL MEDICINE
Payer: MEDICARE

## 2022-11-16 ENCOUNTER — LAB ENCOUNTER (OUTPATIENT)
Dept: LAB | Facility: HOSPITAL | Age: 67
End: 2022-11-16
Attending: INTERNAL MEDICINE
Payer: MEDICARE

## 2022-11-16 DIAGNOSIS — E78.5 HYPERLIPIDEMIA, UNSPECIFIED HYPERLIPIDEMIA TYPE: ICD-10-CM

## 2022-11-16 DIAGNOSIS — Z78.0 POSTMENOPAUSAL: ICD-10-CM

## 2022-11-16 DIAGNOSIS — R73.03 PREDIABETES: ICD-10-CM

## 2022-11-16 LAB
ALBUMIN SERPL-MCNC: 4.2 G/DL (ref 3.4–5)
ALBUMIN/GLOB SERPL: 1.1 {RATIO} (ref 1–2)
ALP LIVER SERPL-CCNC: 42 U/L
ALT SERPL-CCNC: 26 U/L
ANION GAP SERPL CALC-SCNC: 5 MMOL/L (ref 0–18)
AST SERPL-CCNC: 11 U/L (ref 15–37)
BASOPHILS # BLD AUTO: 0.05 X10(3) UL (ref 0–0.2)
BASOPHILS NFR BLD AUTO: 0.8 %
BILIRUB SERPL-MCNC: 0.6 MG/DL (ref 0.1–2)
BILIRUB UR QL: NEGATIVE
BUN BLD-MCNC: 23 MG/DL (ref 7–18)
BUN/CREAT SERPL: 28 (ref 10–20)
CALCIUM BLD-MCNC: 9.8 MG/DL (ref 8.5–10.1)
CHLORIDE SERPL-SCNC: 105 MMOL/L (ref 98–112)
CHOLEST SERPL-MCNC: 201 MG/DL (ref ?–200)
CLARITY UR: CLEAR
CO2 SERPL-SCNC: 30 MMOL/L (ref 21–32)
COLOR UR: YELLOW
CREAT BLD-MCNC: 0.82 MG/DL
DEPRECATED RDW RBC AUTO: 43.7 FL (ref 35.1–46.3)
EOSINOPHIL # BLD AUTO: 0.2 X10(3) UL (ref 0–0.7)
EOSINOPHIL NFR BLD AUTO: 3.3 %
ERYTHROCYTE [DISTWIDTH] IN BLOOD BY AUTOMATED COUNT: 12.9 % (ref 11–15)
EST. AVERAGE GLUCOSE BLD GHB EST-MCNC: 111 MG/DL (ref 68–126)
FASTING PATIENT LIPID ANSWER: YES
FASTING STATUS PATIENT QL REPORTED: YES
GFR SERPLBLD BASED ON 1.73 SQ M-ARVRAT: 79 ML/MIN/1.73M2 (ref 60–?)
GLOBULIN PLAS-MCNC: 3.7 G/DL (ref 2.8–4.4)
GLUCOSE BLD-MCNC: 122 MG/DL (ref 70–99)
GLUCOSE UR-MCNC: NEGATIVE MG/DL
HBA1C MFR BLD: 5.5 % (ref ?–5.7)
HCT VFR BLD AUTO: 41.3 %
HDLC SERPL-MCNC: 77 MG/DL (ref 40–59)
HGB BLD-MCNC: 13.2 G/DL
HGB UR QL STRIP.AUTO: NEGATIVE
IMM GRANULOCYTES # BLD AUTO: 0.01 X10(3) UL (ref 0–1)
IMM GRANULOCYTES NFR BLD: 0.2 %
KETONES UR-MCNC: NEGATIVE MG/DL
LDLC SERPL CALC-MCNC: 108 MG/DL (ref ?–100)
LYMPHOCYTES # BLD AUTO: 2.42 X10(3) UL (ref 1–4)
LYMPHOCYTES NFR BLD AUTO: 39.3 %
MCH RBC QN AUTO: 29.5 PG (ref 26–34)
MCHC RBC AUTO-ENTMCNC: 32 G/DL (ref 31–37)
MCV RBC AUTO: 92.2 FL
MONOCYTES # BLD AUTO: 0.43 X10(3) UL (ref 0.1–1)
MONOCYTES NFR BLD AUTO: 7 %
NEUTROPHILS # BLD AUTO: 3.04 X10 (3) UL (ref 1.5–7.7)
NEUTROPHILS # BLD AUTO: 3.04 X10(3) UL (ref 1.5–7.7)
NEUTROPHILS NFR BLD AUTO: 49.4 %
NITRITE UR QL STRIP.AUTO: NEGATIVE
NONHDLC SERPL-MCNC: 124 MG/DL (ref ?–130)
OSMOLALITY SERPL CALC.SUM OF ELEC: 295 MOSM/KG (ref 275–295)
PH UR: 6 [PH] (ref 5–8)
PLATELET # BLD AUTO: 337 10(3)UL (ref 150–450)
POTASSIUM SERPL-SCNC: 4.1 MMOL/L (ref 3.5–5.1)
PROT SERPL-MCNC: 7.9 G/DL (ref 6.4–8.2)
PROT UR-MCNC: 30 MG/DL
RBC # BLD AUTO: 4.48 X10(6)UL
SODIUM SERPL-SCNC: 140 MMOL/L (ref 136–145)
SP GR UR STRIP: 1.03 (ref 1–1.03)
T4 FREE SERPL-MCNC: 0.9 NG/DL (ref 0.8–1.7)
TRIGL SERPL-MCNC: 93 MG/DL (ref 30–149)
TSI SER-ACNC: 1.54 MIU/ML (ref 0.36–3.74)
UROBILINOGEN UR STRIP-ACNC: <2
VIT C UR-MCNC: NEGATIVE MG/DL
VLDLC SERPL CALC-MCNC: 16 MG/DL (ref 0–30)
WBC # BLD AUTO: 6.2 X10(3) UL (ref 4–11)

## 2022-11-16 PROCEDURE — 84439 ASSAY OF FREE THYROXINE: CPT

## 2022-11-16 PROCEDURE — 80053 COMPREHEN METABOLIC PANEL: CPT

## 2022-11-16 PROCEDURE — 83036 HEMOGLOBIN GLYCOSYLATED A1C: CPT

## 2022-11-16 PROCEDURE — 85025 COMPLETE CBC W/AUTO DIFF WBC: CPT

## 2022-11-16 PROCEDURE — 81001 URINALYSIS AUTO W/SCOPE: CPT

## 2022-11-16 PROCEDURE — 80061 LIPID PANEL: CPT

## 2022-11-16 PROCEDURE — 36415 COLL VENOUS BLD VENIPUNCTURE: CPT

## 2022-11-16 PROCEDURE — 77080 DXA BONE DENSITY AXIAL: CPT | Performed by: INTERNAL MEDICINE

## 2022-11-16 PROCEDURE — 84443 ASSAY THYROID STIM HORMONE: CPT

## 2022-12-26 NOTE — TELEPHONE ENCOUNTER
Please review. Protocol Failed or has No Protocol. Requested Prescriptions   Pending Prescriptions Disp Refills    ALPRAZolam 0.25 MG Oral Tab 60 tablet 0     Sig: Take 1 tablet (0.25 mg total) by mouth 2 (two) times daily.        There is no refill protocol information for this order              Recent Outpatient Visits              2 months ago Medicare annual wellness visit, subsequent    Belkis Pickett MD    Office Visit    4 months ago Glenohumeral arthritis, [facility] for [place], [address], Isidro Amor MD    Office Visit    5 months ago Essential hypertension    [facility], [address], Juan C Ravi, TETO    Office Visit    1 year ago Essential hypertension    [facility], [address], Violeta Ravi APRN    Office Visit    1 year ago Essential hypertension    Belkis Pickett MD    Office Visit

## 2022-12-27 RX ORDER — ALPRAZOLAM 0.25 MG/1
0.25 TABLET ORAL 2 TIMES DAILY
Qty: 60 TABLET | Refills: 0 | Status: SHIPPED | OUTPATIENT
Start: 2022-12-27

## 2022-12-27 RX ORDER — ALPRAZOLAM 0.25 MG/1
0.25 TABLET ORAL 2 TIMES DAILY
Qty: 60 TABLET | Refills: 0 | Status: SHIPPED
Start: 2022-12-27 | End: 2022-12-27

## 2023-02-03 RX ORDER — ALPRAZOLAM 0.25 MG/1
0.25 TABLET ORAL 2 TIMES DAILY
Qty: 60 TABLET | Refills: 0 | Status: SHIPPED | OUTPATIENT
Start: 2023-02-03

## 2023-03-09 RX ORDER — ALPRAZOLAM 0.25 MG/1
0.25 TABLET ORAL 2 TIMES DAILY
Qty: 60 TABLET | Refills: 0 | Status: SHIPPED | OUTPATIENT
Start: 2023-03-09

## 2023-03-09 NOTE — TELEPHONE ENCOUNTER
Please review. Protocol failed / No protocol. Requested Prescriptions   Pending Prescriptions Disp Refills    ALPRAZolam 0.25 MG Oral Tab 60 tablet 0     Sig: Take 1 tablet (0.25 mg total) by mouth 2 (two) times daily.        There is no refill protocol information for this order              Recent Outpatient Visits              5 months ago Medicare annual wellness visit, subsequent    Tram Ramos MD    Office Visit    7 months ago Glenohumeral arthritis, right    [address], [address], [place] MD    Office Visit    8 months ago Essential hypertension    [address], [address], Nael Ravi, APRROSA    Office Visit    1 year ago Essential hypertension    [address], [address], Nael Ravi, APRROSA    Office Visit    1 year ago Essential hypertension    Tram Ramos MD    Office Visit

## 2023-03-27 RX ORDER — ATORVASTATIN CALCIUM 20 MG/1
20 TABLET, FILM COATED ORAL EVERY EVENING
Qty: 90 TABLET | Refills: 3 | Status: SHIPPED | OUTPATIENT
Start: 2023-03-27

## 2023-04-10 RX ORDER — ALPRAZOLAM 0.25 MG/1
0.25 TABLET ORAL 2 TIMES DAILY
Qty: 60 TABLET | Refills: 0 | Status: SHIPPED | OUTPATIENT
Start: 2023-04-10

## 2023-04-10 NOTE — TELEPHONE ENCOUNTER
Please review. Protocol failed or has no protocol. Routing to Raf Ayoub. Dr. Orville Cotto is out of the office. Requested Prescriptions   Pending Prescriptions Disp Refills    ALPRAZolam 0.25 MG Oral Tab 60 tablet 0     Sig: Take 1 tablet (0.25 mg total) by mouth 2 (two) times daily.        There is no refill protocol information for this order          Recent Outpatient Visits              6 months ago Medicare annual wellness visit, subsequent    Rajendra Gutiérrez MD    Office Visit    8 months ago Glenohumeral arthritis, right    [address], [address], Bebeto Amor MD    Office Visit    9 months ago Essential hypertension    [address], [address], Corby Ravi, APRN    Office Visit    1 year ago Essential hypertension    [address], [address], Corby Ravi, APRN    Office Visit    1 year ago Essential hypertension    Rajendra Gutiérrez MD    Office Visit

## 2023-05-10 RX ORDER — ALPRAZOLAM 0.25 MG/1
0.25 TABLET ORAL 2 TIMES DAILY
Qty: 60 TABLET | Refills: 0 | Status: SHIPPED | OUTPATIENT
Start: 2023-05-10

## 2023-05-10 NOTE — TELEPHONE ENCOUNTER
Please review; protocol failed. Requested Prescriptions   Pending Prescriptions Disp Refills    ALPRAZolam 0.25 MG Oral Tab 60 tablet 0     Sig: Take 1 tablet (0.25 mg total) by mouth 2 (two) times daily.        There is no refill protocol information for this order              Recent Outpatient Visits              7 months ago Medicare annual wellness visit, subsequent    Jerilyn Siemens, MD    Office Visit    9 months ago Glenohumeral arthritis, right    [address], [address], [facility] Yang Thakur MD    Office Visit    10 months ago Essential hypertension    [address], [address], Jose Carlos Ravi, APRROSA    Office Visit    1 year ago Essential hypertension    [address], [address], Jose Carlos Ravi, APRROSA    Office Visit    1 year ago Essential hypertension    Jerilyn Siemens, MD    Office Visit

## 2023-06-21 NOTE — TELEPHONE ENCOUNTER
Please review. Protocol failed / Has no protocol. Requested Prescriptions   Pending Prescriptions Disp Refills    ALPRAZolam 0.25 MG Oral Tab 60 tablet 0     Sig: Take 1 tablet (0.25 mg total) by mouth 2 (two) times daily.        There is no refill protocol information for this order           Recent Outpatient Visits              8 months ago Medicare annual wellness visit, subsequent    Megan Rosen MD    Office Visit    10 months ago Glenohumeral arthritis, right    [address], [address], MccloudBang MD    Office Visit    11 months ago Essential hypertension    [address], [address], Lela Ravi, APRROSA    Office Visit    1 year ago Essential hypertension    [address], [address], Lela Ravi, APRROSA    Office Visit    1 year ago Essential hypertension    Megan Rosen MD    Office Visit

## 2023-06-23 RX ORDER — ALPRAZOLAM 0.25 MG/1
0.25 TABLET ORAL 2 TIMES DAILY
Qty: 60 TABLET | Refills: 0 | Status: SHIPPED | OUTPATIENT
Start: 2023-06-23

## 2023-06-25 ENCOUNTER — TELEPHONE (OUTPATIENT)
Dept: INTERNAL MEDICINE CLINIC | Facility: CLINIC | Age: 68
End: 2023-06-25

## 2023-06-27 RX ORDER — AMLODIPINE BESYLATE 5 MG/1
5 TABLET ORAL DAILY
Qty: 90 TABLET | Refills: 0 | Status: SHIPPED | OUTPATIENT
Start: 2023-06-27

## 2023-07-07 ENCOUNTER — TELEPHONE (OUTPATIENT)
Dept: CASE MANAGEMENT | Age: 68
End: 2023-07-07

## 2023-07-07 DIAGNOSIS — J34.89 LESION OF NOSE: Primary | ICD-10-CM

## 2023-07-07 NOTE — TELEPHONE ENCOUNTER
Dr. Vivas Early,     Patient is requesting a referral to Dr. Zully Sutherland for  bumps on nose. Pended referral please review diagnosis and sign off if you agree. Thank you.   Jonh Santana

## 2023-08-17 ENCOUNTER — HOSPITAL ENCOUNTER (OUTPATIENT)
Dept: GENERAL RADIOLOGY | Facility: HOSPITAL | Age: 68
Discharge: HOME | End: 2023-08-17
Payer: MEDICARE

## 2023-08-17 ENCOUNTER — OFFICE VISIT (OUTPATIENT)
Dept: INTERNAL MEDICINE CLINIC | Facility: CLINIC | Age: 68
End: 2023-08-17

## 2023-08-17 VITALS
HEART RATE: 88 BPM | TEMPERATURE: 99 F | SYSTOLIC BLOOD PRESSURE: 128 MMHG | OXYGEN SATURATION: 96 % | DIASTOLIC BLOOD PRESSURE: 72 MMHG | HEIGHT: 60 IN | WEIGHT: 139 LBS | BODY MASS INDEX: 27.29 KG/M2

## 2023-08-17 DIAGNOSIS — E78.5 HYPERLIPIDEMIA, UNSPECIFIED HYPERLIPIDEMIA TYPE: ICD-10-CM

## 2023-08-17 DIAGNOSIS — R06.02 SHORTNESS OF BREATH: ICD-10-CM

## 2023-08-17 DIAGNOSIS — R05.1 ACUTE COUGH: ICD-10-CM

## 2023-08-17 DIAGNOSIS — J01.90 ACUTE BACTERIAL RHINOSINUSITIS: Primary | ICD-10-CM

## 2023-08-17 DIAGNOSIS — B96.89 ACUTE BACTERIAL RHINOSINUSITIS: Primary | ICD-10-CM

## 2023-08-17 PROCEDURE — 71046 X-RAY EXAM CHEST 2 VIEWS: CPT

## 2023-08-17 RX ORDER — BENZONATATE 100 MG/1
100 CAPSULE ORAL 2 TIMES DAILY PRN
Qty: 10 CAPSULE | Refills: 0 | Status: SHIPPED | OUTPATIENT
Start: 2023-08-17

## 2023-08-17 RX ORDER — ATORVASTATIN CALCIUM 20 MG/1
20 TABLET, FILM COATED ORAL EVERY EVENING
Qty: 90 TABLET | Refills: 3 | Status: SHIPPED | OUTPATIENT
Start: 2023-08-17

## 2023-08-17 RX ORDER — AMOXICILLIN AND CLAVULANATE POTASSIUM 875; 125 MG/1; MG/1
1 TABLET, FILM COATED ORAL 2 TIMES DAILY
Qty: 10 TABLET | Refills: 0 | Status: SHIPPED | OUTPATIENT
Start: 2023-08-17 | End: 2023-08-22

## 2023-08-21 ENCOUNTER — TELEPHONE (OUTPATIENT)
Dept: INTERNAL MEDICINE CLINIC | Facility: CLINIC | Age: 68
End: 2023-08-21

## 2023-08-24 ENCOUNTER — PATIENT MESSAGE (OUTPATIENT)
Dept: INTERNAL MEDICINE CLINIC | Facility: CLINIC | Age: 68
End: 2023-08-24

## 2023-08-24 NOTE — TELEPHONE ENCOUNTER
From: Sil Dhillon  To: TETO Gil  Sent: 8/24/2023 1:12 PM CDT  Subject: Medication    Hi Flora,  My congestion and caught is still here and I ran out of antibiotics, I was starting to feel better before I ran out of medication, should I get a refill? I look forward to your response or can be reached at [phone] and thank you.   Sil Dhillon

## 2023-08-25 NOTE — TELEPHONE ENCOUNTER
Please review. Protocol failed / Has no protocol. No Active/ Future labs pended for CMP    Requested Prescriptions   Pending Prescriptions Disp Refills    amLODIPine 5 MG Oral Tab 90 tablet 0     Sig: Take 1 tablet (5 mg total) by mouth daily. Hypertensive Medications Protocol Failed - 8/25/2023  1:08 PM        Failed - CMP or BMP in past 6 months     No results found for this or any previous visit (from the past 4392 hour(s)).             Passed - In person appointment in the past 12 or next 3 months     Recent Outpatient Visits              1 week ago Acute bacterial rhinosinusitis    [address] [facility], [facility] [address] [facility],[address], APRN    Office Visit    11 months ago Sole Koehler annual wellness visit, subsequent    Rajendra Gutiérrez MD    Office Visit    1 year ago Glenohumeral arthritis, right    [facility] [place], [address], Bebeto Amor MD    Office Visit    1 year ago Essential hypertension    Sid [place], [address], Corby Raiv, APRN    Office Visit    1 year ago Essential hypertension    [facility], [address], [place], Corby Gal, APRN    Office Visit                      Passed - Last BP reading less than 140/90     BP Readings from Last 1 Encounters:  08/17/23 : 128/72              Passed - In person appointment or virtual visit in the past 6 months     Recent Outpatient Visits              1 week ago Acute bacterial rhinosinusitis    [address] [facility], [place] [facility], [facility]    Office Visit    11 months ago Sole Koehler annual wellness visit, subsequent    Rajendra Gutiérrez MD    Office Visit    1 year ago Glenohumeral arthritis, right    Hernan Luciano, Kurtis Bergeron MD    Office Visit    1 year ago Essential hypertension    [facility], Trav Wilson Phil Darter, TETO    Office Visit    1 year ago Essential hypertension    [address], Trav Wilson Phil Darter, TETO    Office Visit                      Passed - EGFRCR or GFRNAA > 50     GFR Evaluation  EGFRCR: 79 , resulted on 11/16/2022                Recent Outpatient Visits              1 week ago Acute bacterial rhinosinusitis    [facility], [address] [facility] Abigail Stevenson, TETO    Office Visit    11 months ago Sole Koehler annual wellness visit, subsequent    Rosie Cadet MD    Office Visit    1 year ago Glenohumeral arthritis, right    [address], Zuleyma Wilson Laurena Grey, MD    Office Visit    1 year ago Essential hypertension    [address], Trav Wilson Phil Darter, APRROSA    Office Visit    1 year ago Essential hypertension    [address], Trav Wilson Phil Darter, TETO    Office Visit

## 2023-08-26 RX ORDER — AMLODIPINE BESYLATE 5 MG/1
5 TABLET ORAL DAILY
Qty: 90 TABLET | Refills: 0 | Status: SHIPPED | OUTPATIENT
Start: 2023-08-26

## 2023-09-20 ENCOUNTER — PATIENT MESSAGE (OUTPATIENT)
Dept: INTERNAL MEDICINE CLINIC | Facility: CLINIC | Age: 68
End: 2023-09-20

## 2023-09-21 ENCOUNTER — OFFICE VISIT (OUTPATIENT)
Dept: INTERNAL MEDICINE CLINIC | Facility: CLINIC | Age: 68
End: 2023-09-21

## 2023-09-21 VITALS
SYSTOLIC BLOOD PRESSURE: 132 MMHG | BODY MASS INDEX: 27.29 KG/M2 | OXYGEN SATURATION: 95 % | WEIGHT: 139 LBS | DIASTOLIC BLOOD PRESSURE: 79 MMHG | HEIGHT: 60 IN | HEART RATE: 82 BPM

## 2023-09-21 DIAGNOSIS — E78.5 HYPERLIPIDEMIA, UNSPECIFIED HYPERLIPIDEMIA TYPE: ICD-10-CM

## 2023-09-21 DIAGNOSIS — R73.03 PREDIABETES: ICD-10-CM

## 2023-09-21 DIAGNOSIS — Z00.00 ENCOUNTER FOR ANNUAL HEALTH EXAMINATION: Primary | ICD-10-CM

## 2023-09-21 DIAGNOSIS — I10 ESSENTIAL HYPERTENSION: ICD-10-CM

## 2023-09-21 DIAGNOSIS — J34.89 LESION OF NOSE: ICD-10-CM

## 2023-09-21 DIAGNOSIS — Z12.31 VISIT FOR SCREENING MAMMOGRAM: ICD-10-CM

## 2023-09-21 PROBLEM — F41.9 ANXIETY DISORDER, UNSPECIFIED: Status: ACTIVE | Noted: 2020-07-21

## 2023-09-21 PROCEDURE — 3008F BODY MASS INDEX DOCD: CPT

## 2023-09-21 PROCEDURE — 1159F MED LIST DOCD IN RCRD: CPT

## 2023-09-21 PROCEDURE — 3078F DIAST BP <80 MM HG: CPT

## 2023-09-21 PROCEDURE — 96160 PT-FOCUSED HLTH RISK ASSMT: CPT

## 2023-09-21 PROCEDURE — 3075F SYST BP GE 130 - 139MM HG: CPT

## 2023-09-21 PROCEDURE — 1160F RVW MEDS BY RX/DR IN RCRD: CPT

## 2023-09-21 PROCEDURE — G0438 PPPS, INITIAL VISIT: HCPCS

## 2023-09-21 PROCEDURE — 1170F FXNL STATUS ASSESSED: CPT

## 2023-09-21 PROCEDURE — 1125F AMNT PAIN NOTED PAIN PRSNT: CPT

## 2023-09-21 NOTE — TELEPHONE ENCOUNTER
From: Nieves Bonilla  To: Carissa Ochoa  Sent: 9/20/2023 10:11 AM CDT  Subject: DR Gabriel Young referral    [facility] Dr Jared Smith,  I need a Referral for Dr Joe Few please to get a wart removed from my nose, also I made an appointment for my Wellness exam.  Thank you.    Nieves Bonilla

## 2023-09-22 RX ORDER — ALPRAZOLAM 0.25 MG/1
0.25 TABLET ORAL 2 TIMES DAILY
Qty: 60 TABLET | Refills: 0 | Status: SHIPPED | OUTPATIENT
Start: 2023-09-22

## 2023-09-22 NOTE — TELEPHONE ENCOUNTER
Please review; protocol failed. Requested Prescriptions   Pending Prescriptions Disp Refills    ALPRAZolam 0.25 MG Oral Tab 60 tablet 0     Sig: Take 1 tablet (0.25 mg total) by mouth 2 (two) times daily.        There is no refill protocol information for this order        Recent Outpatient Visits              Yesterday Encounter for annual health examination    [address], [address] Aguilar APRN    Office Visit    1 month ago Acute bacterial rhinosinusitis    [place] Hawk APRN    Office Visit    11 months ago Sole Koehler annual wellness visit, subsequent    Lamonte Avitia MD    Office Visit    1 year ago Glenohumeral arthritis, right    [address], [address], [place]énesis mansfield MD    Office Visit    1 year ago Essential hypertension    [address], [address] [facility],[address], Jaylene Ravi, APRN    Office Visit

## 2023-10-31 RX ORDER — ALPRAZOLAM 0.25 MG/1
0.25 TABLET ORAL 2 TIMES DAILY
Qty: 60 TABLET | Refills: 0 | Status: SHIPPED | OUTPATIENT
Start: 2023-10-31

## 2023-10-31 NOTE — TELEPHONE ENCOUNTER
Please review; protocol failed. Requested Prescriptions   Pending Prescriptions Disp Refills    ALPRAZolam 0.25 MG Oral Tab 60 tablet 0     Sig: Take 1 tablet (0.25 mg total) by mouth 2 (two) times daily.        There is no refill protocol information for this order        Recent Outpatient Visits              1 month ago Encounter for annual health examination    [address], [address] [facility], APRN    Office Visit    2 months ago Acute bacterial rhinosinusitis    [facility], [facility] [address] [facility],[address], APRN    Office Visit    1 year ago Sole Koehler annual wellness visit, subsequent    [address], Zandra Castleman, MD    Office Visit    1 year ago Glenohumeral arthritis, right    [address], [address], [place], Caron Morin MD    Office Visit    1 year ago Essential hypertension    [address], [address] [facility],[address], [place], [facility], APRN    Office Visit          Future Appointments         Provider Department Appt Notes    In 1 month Yoni Blackwood MD [address], Jeni ref in system

## 2023-11-15 ENCOUNTER — TELEPHONE (OUTPATIENT)
Dept: INTERNAL MEDICINE CLINIC | Facility: CLINIC | Age: 68
End: 2023-11-15

## 2023-11-16 RX ORDER — FOSINOPRIL SODIUM AND HYDROCHLOROTHIAZIDE 20; 12.5 MG/1; MG/1
1 TABLET ORAL DAILY
Qty: 90 TABLET | Refills: 1 | Status: SHIPPED | OUTPATIENT
Start: 2023-11-16

## 2023-11-16 NOTE — TELEPHONE ENCOUNTER
Please review. Protocol failed / Has no protocol. [employer] message sent to patient to complete labs pended from [address] [place] [address] 9/21/23. Requested Prescriptions   Pending Prescriptions Disp Refills    FOSINOPRIL SODIUM-HCTZ 20-12.5 MG Oral Tab [Pharmacy Med Name: FOSINOPRIL SODIUM/HYDROCHLOROTHIAZIDE 20-12.5 MG Tablet] 90 tablet 10     Sig: TAKE 1 TABLET EVERY DAY       Hypertensive Medications Protocol Failed - 11/15/2023  9:15 AM        Failed - CMP or BMP in past 6 months     No results found for this or any previous visit (from the past 4392 hour(s)).             Passed - In person appointment in the past 12 or next 3 months     Recent Outpatient Visits              1 month ago Encounter for annual health examination    [address], [address] [facility] Kenneth [facility]    Office Visit    3 months ago Acute bacterial rhinosinusitis    [address] [facility], [facility] Kenneth [facility]    Office Visit    1 year ago Sole Koehler annual wellness visit, subsequent    Myles Grider MD    Office Visit    1 year ago Glenohumeral arthritis, right    [address], [address] [facility] [address], [place], Rodríguez Meyer MD    Office Visit    1 year ago Essential hypertension    [address], [address], TETO Larson    Office Visit          Future Appointments         Provider Department Appt Notes    In 3 weeks Yenni Hubbard MD [address], [facility] ref in system               Passed - Last BP reading less than 140/90     BP Readings from Last 1 Encounters:   09/21/23 132/79               Passed - In person appointment or virtual visit in the past 6 months     Recent Outpatient Visits              1 month ago Encounter for annual health examination    [address], [address] [facility], [address] [facility],[address], [address] [facility], [facility]    Office Visit    3 months ago Acute bacterial rhinosinusitis    [facility], [address], [facility]    Office Visit    1 year ago Sole Koehler annual wellness visit, subsequent    Edwin Irizarry MD    Office Visit    1 year ago Glenohumeral arthritis, right    [address], [address], [place], Roselia Yi MD    Office Visit    1 year ago Essential hypertension    [address], [address], [place], [place], APRN    Office Visit          Future Appointments         Provider Department Appt Notes    In 3 weeks Maryam Troy MD [address], Jeni ref in system               Passed - [facility] or GFRNAA > 50     GFR Evaluation  EGFRCR: 79 , resulted on 11/16/2022             Future Appointments         Provider Department Appt Notes    In 3 weeks Maryam Troy MD [address], Jeni ref in system           Recent Outpatient Visits              1 month ago Encounter for annual health examination    [address], [address] [facility], [address] [facility] [address], APRN    Office Visit    3 months ago Acute bacterial rhinosinusitis    [address], [address] [facility], [place] Kevin Ortiz, APRN    Office Visit    1 year ago Sole Koehler annual wellness visit, subsequent    Edwin Irizarry MD    Office Visit    1 year ago Glenohumeral arthritis, right    [address], [address], [place], Roselia Yi MD    Office Visit    1 year ago Essential hypertension    [address], [address], [place], [place], APRN    Office Visit

## 2023-11-16 NOTE — TELEPHONE ENCOUNTER
Please ask patient to get her ordered labs done- due since September- need these before any further refills

## 2023-11-17 ENCOUNTER — TELEPHONE (OUTPATIENT)
Dept: INTERNAL MEDICINE CLINIC | Facility: CLINIC | Age: 68
End: 2023-11-17

## 2023-11-21 NOTE — TELEPHONE ENCOUNTER
Please review. Protocol failed / Has no protocol. Requested Prescriptions   Pending Prescriptions Disp Refills    AMLODIPINE 5 MG Oral Tab [Pharmacy Med Name: AMLODIPINE BESYLATE 5 MG Tablet] 90 tablet 10     Sig: TAKE 1 TABLET EVERY DAY       Hypertensive Medications Protocol Failed - 11/20/2023  1:33 AM        Failed - CMP or BMP in past 6 months     No results found for this or any previous visit (from the past 4392 hour(s)).             Failed - EGFRCR or GFRNAA > 50     GFR Evaluation            Passed - In person appointment in the past 12 or next 3 months     Recent Outpatient Visits              2 months ago Encounter for annual health examination    [address], [address] [facility], [place] Lisa Cuevas, APRN    Office Visit    3 months ago Acute bacterial rhinosinusitis    [facility], [id number] [facility], [facility] [address] [facility],[address], APRN    Office Visit    1 year ago Sole Koehler annual wellness visit, subsequent    Gualberto Fatima MD    Office Visit    1 year ago Glenohumeral arthritis, right    [address], [address], [place], Aarti Gill MD    Office Visit    1 year ago Essential hypertension    [address], [address], Gerard Bermeo, APRN    Office Visit          Future Appointments         Provider Department Appt Notes    In 3 weeks Modesta Natarajan MD [address], [facility] ref in system               Passed - Last BP reading less than 140/90     BP Readings from Last 1 Encounters:   09/21/23 132/79               Passed - In person appointment or virtual visit in the past 6 months     Recent Outpatient Visits              2 months ago Encounter for annual health examination    [address], [address] [facility], [facility] [address] [facility],[address], APRN    Office Visit    3 months ago Acute bacterial rhinosinusitis    [facility], [address] [facility], [facility]    Office Visit    1 year ago Estée Layessi annual wellness visit, subsequent    Rajendra Gutiérrez MD    Office Visit    1 year ago Glenohumeral arthritis, right    [address], [address], [place], Kathalene January, MD    Office Visit    1 year ago Essential hypertension    [address], [address], [place], Corby Gal, APRN    Office Visit          Future Appointments         Provider Department Appt Notes    In 3 weeks Matthew Carey MD [address], [place] ref in system                  Future Appointments         Provider Department Appt Notes    In 3 weeks Matthew Carey MD [address], [place] ref in system           Recent Outpatient Visits              2 months ago Encounter for annual health examination    [address], [address] [facility], [address], APRN    Office Visit    3 months ago Acute bacterial rhinosinusitis    [address], [address] [facility] Alekbedzuleyka Rounds, APRN    Office Visit    1 year ago Estée Layessi annual wellness visit, subsequent    Rajendra Gutiérrez MD    Office Visit    1 year ago Glenohumeral arthritis, right    [address], [address], [place], Bebeto Pennington, MD    Office Visit    1 year ago Essential hypertension    [address], [address], [place], Corby Gal, APRN    Office Visit

## 2023-11-22 RX ORDER — AMLODIPINE BESYLATE 5 MG/1
5 TABLET ORAL DAILY
Qty: 30 TABLET | Refills: 0 | Status: SHIPPED | OUTPATIENT
Start: 2023-11-22

## 2023-11-22 NOTE — TELEPHONE ENCOUNTER
Spoke, with the patient and informed her of the message below. Pt stated that [facility]

## 2023-12-13 ENCOUNTER — OFFICE VISIT (OUTPATIENT)
Dept: DERMATOLOGY CLINIC | Facility: CLINIC | Age: 68
End: 2023-12-13

## 2023-12-13 DIAGNOSIS — L82.1 SK (SEBORRHEIC KERATOSIS): ICD-10-CM

## 2023-12-13 DIAGNOSIS — L81.4 LENTIGO: ICD-10-CM

## 2023-12-13 DIAGNOSIS — D48.5 NEOPLASM OF UNCERTAIN BEHAVIOR OF SKIN: Primary | ICD-10-CM

## 2023-12-13 DIAGNOSIS — D23.9 BENIGN NEOPLASM OF SKIN, UNSPECIFIED LOCATION: ICD-10-CM

## 2023-12-13 DIAGNOSIS — D22.9 MULTIPLE NEVI: ICD-10-CM

## 2023-12-13 DIAGNOSIS — D23.9 SYRINGOMA: ICD-10-CM

## 2023-12-13 PROCEDURE — 88305 TISSUE EXAM BY PATHOLOGIST: CPT | Performed by: DERMATOLOGY

## 2023-12-14 RX ORDER — AMLODIPINE BESYLATE 5 MG/1
5 TABLET ORAL DAILY
Qty: 90 TABLET | Refills: 3 | Status: SHIPPED | OUTPATIENT
Start: 2023-12-14

## 2023-12-14 NOTE — TELEPHONE ENCOUNTER
Patient notified to complete fasting labs as ordered. She will do so. Please advise on refill request.    Requested Prescriptions     Pending Prescriptions Disp Refills    AMLODIPINE 5 MG Oral Tab [Pharmacy Med Name: AMLODIPINE BESYLATE 5 MG Tablet] 30 tablet 3     Sig: TAKE 1 TABLET EVERY DAY      Recent Visits  Date Type Provider Dept   09/21/23 Office Visit TETO Savage [facility]   08/17/23 Office Visit TETO Savage [facility]   09/29/22 Office Visit Balaji Abraham MD [facility]   Showing recent visits within past 540 days with a meds authorizing provider and meeting all other requirements  Future Appointments  No visits were found meeting these conditions. Showing future appointments within next 150 days with a meds authorizing provider and meeting all other requirements     Requested Prescriptions   Pending Prescriptions Disp Refills    AMLODIPINE 5 MG Oral Tab [Pharmacy Med Name: AMLODIPINE BESYLATE 5 MG Tablet] 30 tablet 3     Sig: TAKE 1 TABLET EVERY DAY       Hypertensive Medications Protocol Failed - 12/13/2023  1:38 AM        Failed - CMP or BMP in past 6 months     No results found for this or any previous visit (from the past 4392 hour(s)).             Failed - EGFRCR or GFRNAA > 50     GFR Evaluation            Passed - In person appointment in the past 12 or next 3 months     Recent Outpatient Visits              [facility]    [facility]    2 months ago Encounter for annual health examination    [address], [address] [facility], TETO Toure    Office Visit    3 months ago Acute bacterial rhinosinusitis    [address] [facility]    Office Visit    1 year ago Sole Koehler annual wellness visit, subsequent    [address], [facility] Whit Owen MD    Office Visit    1 year ago Glenohumeral arthritis, right    [facility], [address], Abhi Amor MD    Office Visit                      Passed - Last BP reading less than 140/90     BP Readings from Last 1 Encounters:   09/21/23 132/79               Passed - In person appointment or virtual visit in the past 6 months     Recent Outpatient Visits              Yesterday Neoplasm of uncertain behavior of skin    Arlean Spotted, Mary Pacheco MD    Office Visit    2 months ago Encounter for annual health examination    [address], [address] [facility], [address] [facility],[address], APRN    Office Visit    3 months ago Acute bacterial rhinosinusitis    Arlean Spotted, [place] Griffin Camera, APRN    Office Visit    1 year ago [employer] annual wellness visit, subsequent    [address], Gilda Montgomery MD    Office Visit    1 year ago Glenohumeral arthritis, right    [address], [address], Abhi Amor MD    Office Visit                             Recent Outpatient Visits              Yesterday Neoplasm of uncertain behavior of skin    Arlean Spotted, Mary Pacheco MD    Office Visit    2 months ago Encounter for annual health examination    [address], [address] [place], [place] Cortney Camera, APRN    Office Visit    3 months ago Acute bacterial rhinosinusitis    Arlean Spotted, [place] Cortney Camera, APRN    Office Visit    1 year ago [employer] annual wellness visit, subsequent    Joleen Saavedra MD    Office Visit    1 year ago Glenohumeral arthritis, right    Zuleyma Verduzco [place], MD    Office Visit

## 2023-12-19 RX ORDER — ALPRAZOLAM 0.25 MG/1
0.25 TABLET ORAL 2 TIMES DAILY
Qty: 60 TABLET | Refills: 0 | Status: SHIPPED | OUTPATIENT
Start: 2023-12-19

## 2023-12-19 NOTE — TELEPHONE ENCOUNTER
No protocol for requested medication. Please advise on refill request.      Requested Prescriptions     Pending Prescriptions Disp Refills    ALPRAZolam 0.25 MG Oral Tab 60 tablet 0     Sig: Take 1 tablet (0.25 mg total) by mouth 2 (two) times daily. Recent Visits  Date Type Provider Dept   09/21/23 Office Visit TETO Chaudhary [facility]   08/17/23 Office Visit TETO Chaudhary [facility]   09/29/22 Office Visit Juanita Sanches MD [facility]   Showing recent visits within past 540 days with a meds authorizing provider and meeting all other requirements  Future Appointments  No visits were found meeting these conditions. Showing future appointments within next 150 days with a meds authorizing provider and meeting all other requirements     Requested Prescriptions   Pending Prescriptions Disp Refills    ALPRAZolam 0.25 MG Oral Tab 60 tablet 0     Sig: Take 1 tablet (0.25 mg total) by mouth 2 (two) times daily.        There is no refill protocol information for this order            Recent Outpatient Visits              6 days ago Neoplasm of uncertain behavior of skin    Nelma Eisenmenger, [place] Gracelyn [place], MD    Office Visit    2 months ago Encounter for annual health examination    Nelma Eisenmenger, [place] Leonette Messing, APRN    Office Visit    4 months ago Acute bacterial rhinosinusitis    Nelma Eisenmenger, [place] Leonette Messing, APRN    Office Visit    1 year ago Sole Koehler annual wellness visit, subsequent    Aleksandr Mattson MD    Office Visit    1 year ago Glenohumeral arthritis, right    Zuleyma Reyes, Lisbet Winkler MD    Office Visit

## 2023-12-19 NOTE — PROGRESS NOTES
The pathology report from last visit showed A. Skin, right nasal dorsum, shave biopsy:  -Actinic keratosis with erosion. B.  Skin, right lateral breast, shave biopsy:  -Inflamed seborrheic keratosis, hyperpigmented. .  Please log in test results, send biopsy results letter. Pt to rtc 1 year or prn.

## 2023-12-26 NOTE — PROGRESS NOTES
Operative Report                     Shave/  Tangential biopsy     Clinical diagnosis:    Size of lesion:    Location:   Pt with new lesion on nose changing lesion on right lateral breast A. Right nasal dorsum erythematous keratotic papule 4mm r/o SCC B. Right lateral breast exophytic papule 1.2cm on tan brown base irritated sk r/o atypical pigmented lesion,   Procedure: With patient in appropriate position the skin of the above was scrubbed with alcohol. Anesthesia was obtained with 1% Xylocaine with epinephrine. The skin surrounding the lesion was placed under tension and the lesion was incised using a #15 scalpel blade. The specimen was sent for histopathologic exam.    Hemostasis was obtained with electrocautery/aluminum chloride. Estimated blood loss less than 2 cc. Biopsy dressed with Polysporin, bandage. Pressure dressing:   No    Complications: None    Written instructions given and reviewed with patient    Await pathology    Contact information reviewed.     Procedural physician:  Cora Nunes MD

## 2023-12-26 NOTE — PROGRESS NOTES
Rogelio Lino is a 80year old female. HPI:     CC:    Chief Complaint   Patient presents with    Derm Problem     LOV 3/29/19; pt presents today with an area of concern on her nasal tip - a crusty, red, raised spot that intermittently gets bigger/reduces; denies bleeding. No personal or family history of skin cancer. Also c/o skin tag at right axilla, occasionally irritated due to location/clothing         Allergies:  Patient has no known allergies. HISTORY:    Past Medical History:   Diagnosis Date    Actinic keratosis     right nasal dorsum    Anxiety     Chicken pox     Heart murmur     Measles     Osteoarthritis     Other and unspecified hyperlipidemia     Primary osteoarthritis of left shoulder 2020    Unspecified essential hypertension     Visual impairment     glasses      Past Surgical History:   Procedure Laterality Date    COLONOSCOPY      COLONOSCOPY      EGD      ELECTROCARDIOGRAM, COMPLETE  2005    Scanned to media tab     HYSTERECTOMY      FLORES LOCALIZATION WIRE 1 SITE RIGHT (CPT=19281) Right 2010    OTHER      left shoulder replacement     OTHER SURGICAL HISTORY      breast biopsy     THYROIDECTOMY      UPPER GI ENDOSCOPY PERFORMED  2015      Family History   Problem Relation Age of Onset    Diabetes Sister     Hypertension Father     Heart Disorder Mother     Cancer Mother     Lung Disorder Mother     Other (Other) Maternal Grandmother     Other (Other) Maternal Grandfather     Other (Other) Paternal Grandmother     Other (Other) Paternal Grandfather     Hypertension Sister 67    Breast Cancer Neg     Ovarian Cancer Neg       Social History     Socioeconomic History    Marital status:    Tobacco Use    Smoking status: Former     Types: Cigarettes     Quit date: 1/10/1990     Years since quittin.9    Smokeless tobacco: Never   Vaping Use    Vaping Use: Never used   Substance and Sexual Activity    Alcohol use: No     Comment: occ.     Drug use: No   Other Topics Concern    Caffeine Concern Yes     Comment: Occasionally; coffee     Reaction to local anesthetic No    Pt has a pacemaker No    Pt has a defibrillator No        Current Outpatient Medications   Medication Sig Dispense Refill    Fosinopril Sodium-HCTZ 20-12.5 MG Oral Tab Take 1 tablet by mouth daily. 90 tablet 1    atorvastatin 20 MG Oral Tab Take 1 tablet (20 mg total) by mouth every evening. 90 tablet 3    ALPRAZolam 0.25 MG Oral Tab Take 1 tablet (0.25 mg total) by mouth 2 (two) times daily. 60 tablet 0    amLODIPine 5 MG Oral Tab Take 1 tablet (5 mg total) by mouth daily. 90 tablet 3     Allergies:   No Known Allergies    Past Medical History:   Diagnosis Date    Actinic keratosis     right nasal dorsum    Anxiety     Chicken pox     Heart murmur     Measles     Osteoarthritis     Other and unspecified hyperlipidemia     Primary osteoarthritis of left shoulder 2020    Unspecified essential hypertension     Visual impairment     glasses     Past Surgical History:   Procedure Laterality Date    COLONOSCOPY      COLONOSCOPY      EGD      ELECTROCARDIOGRAM, COMPLETE  2005    Scanned to media tab     HYSTERECTOMY      FLORES LOCALIZATION WIRE 1 SITE RIGHT (CPT=19281) Right     OTHER      left shoulder replacement     OTHER SURGICAL HISTORY      breast biopsy     THYROIDECTOMY      UPPER GI ENDOSCOPY PERFORMED  2015     Social History     Socioeconomic History    Marital status:      Spouse name: Not on file    Number of children: Not on file    Years of education: Not on file    Highest education level: Not on file   Occupational History    Not on file   Tobacco Use    Smoking status: Former     Types: Cigarettes     Quit date: 1/10/1990     Years since quittin.9    Smokeless tobacco: Never   Vaping Use    Vaping Use: Never used   Substance and Sexual Activity    Alcohol use: No     Comment: occ.     Drug use: No    Sexual activity: Not on file   Other Topics Concern  Service Not Asked    Blood Transfusions Not Asked    Caffeine Concern Yes     Comment: Occasionally; coffee     Occupational Exposure Not Asked    Hobby Hazards Not Asked    Sleep Concern Not Asked    Stress Concern Not Asked    Weight Concern Not Asked    Special Diet Not Asked    Back Care Not Asked    Exercise Not Asked    Bike Helmet Not Asked    Seat Belt Not Asked    Self-Exams Not Asked    Grew up on a farm Not Asked    History of tanning Not Asked    Outdoor occupation Not Asked    Breast feeding Not Asked    Reaction to local anesthetic No    Pt has a pacemaker No    Pt has a defibrillator No   Social History Narrative    Not on file     Social Determinants of Health     Financial Resource Strain: Not on file   Food Insecurity: Not on file   Transportation Needs: Not on file   Physical Activity: Not on file   Stress: Not on file   Social Connections: Not on file   Housing Stability: Not on file     Family History   Problem Relation Age of Onset    Diabetes Sister     Hypertension Father     Heart Disorder Mother     Cancer Mother     Lung Disorder Mother     Other (Other) Maternal Grandmother     Other (Other) Maternal Grandfather     Other (Other) Paternal Grandmother     Other (Other) Paternal Grandfather     Hypertension Sister 67    Breast Cancer Neg     Ovarian Cancer Neg        There were no vitals filed for this visit. HPI:  Chief Complaint   Patient presents with    Derm Problem     LOV 3/29/19; pt presents today with an area of concern on her nasal tip - a crusty, red, raised spot that intermittently gets bigger/reduces; denies bleeding. No personal or family history of skin cancer. Also c/o skin tag at right axilla, occasionally irritated due to location/clothing           Patient presents with concerns above. Patient has been in their usual state of health. Past notes/ records and appropriate/relevant lab results including pathology and past body maps reviewed.  Including outside notes/ PCP notes as appropriate. Updated and new information noted in current visit. ROS:  Denies other relevant systemic complaints. History, medications, allergies reviewed as noted. Physical Examination:     Well-developed well-nourished patient alert oriented in no acute distress. Exam performed, including scalp, head, neck, face,nails, hair, external eyes, including conjunctival mucosa, eyelids, lips external ears , arms, digits,palms. Multiple light to medium brown, well marginated, uniformly pigmented, macules and papules 6 mm and less scattered on exam. pigmented lesions examined with dermoscopy benign-appearing patterns. Waxy tannish keratotic papules scattered, cherry-red vascular papules scattered. See map today's date for lesions noted . See assessment and plan below for specific lesions. Otherwise remarkable for lesions as noted on map. See A/P  below for additional information:    Assessment / plan:    Orders Placed This Encounter   Procedures    Specimen to Pathology, Tissue [IHP Pt to [address] [place] [address] for this Visit:  Requested Prescriptions      No prescriptions requested or ordered in this encounter         Encounter Diagnoses   Name Primary? Neoplasm of uncertain behavior of skin Yes    Multiple nevi     Benign neoplasm of skin, unspecified location     Lentigo     SK (seborrheic keratosis)         Pt with new lesion on nose changing lesion on right lateral breast A. Right nasal dorsum erythematous keratotic papule 4mm r/o SCC B. Right lateral breast exophytic papule 1.2cm on tan brown base irritated sk r/o atypical pigmented lesion,   Shave/ tangential biopsy performed, operative note and consent in chart further plans pending pathology  X2    Background of syringoma's over the eyelids noted previously asymptomatic  On examination multiple whitish dome-shaped smooth papules are noted. Milia diagnosis , pathophysiology discussed. Over-the-counter retinol products may cause peeling irritation. Consider prescription Retin-A if worsening. Other skin tags over the chest reassurance. Please refer to map for specific lesions. See additional diagnoses. Pros cons of various therapies, risks benefits discussed. Pathophysiology discussed with patient. Therapeutic options reviewed. See  Medications in grid. Instructions reviewed at length. Benign nevi, seborrheic  keratoses, cherry angiomas:  Reassurance regarding other benign skin lesions. Signs and symptoms of skin cancer, ABCDE's of melanoma discussed with patient. Sunscreen use, sun protection, self exams reviewed. Followup as noted RTC ---routine checkup    6 mos -one year or p.r.n. Encounter Times   Including precharting, reviewing chart, prior notes obtaining history: 10 minutes, medical exam :10 minutes, notes on body map, plan, counseling 10minutes My total time spent caring for the patient on the day of the encounter: 30 minutes     The patient indicates understanding of these issues and agrees to the plan. The patient is asked to return as noted in follow-up/ above. This note was generated using Dragon voice recognition software. Please contact me regarding any confusion resulting from errors in recognition. .  Note to patient and family: The [facility] makes medical notes like these available to patients. However, be advised this is a medical document. It is intended as bigb-fc-toft communication and monitoring of a patient's care needs. It is written in medical language and may contain abbreviations or verbiage that are unfamiliar. It may appear blunt or direct. Medical documents are intended to carry relevant information, facts as evident and the clinical opinion of the practitioner.

## 2024-01-27 ENCOUNTER — LAB ENCOUNTER (OUTPATIENT)
Dept: LAB | Age: 69
End: 2024-01-27
Payer: MEDICARE

## 2024-01-27 DIAGNOSIS — Z00.00 ENCOUNTER FOR ANNUAL HEALTH EXAMINATION: ICD-10-CM

## 2024-01-27 DIAGNOSIS — R73.03 PREDIABETES: ICD-10-CM

## 2024-01-27 LAB
ALBUMIN SERPL-MCNC: 4.9 G/DL (ref 3.2–4.8)
ALBUMIN/GLOB SERPL: 1.7 {RATIO} (ref 1–2)
ALP LIVER SERPL-CCNC: 40 U/L
ALT SERPL-CCNC: 19 U/L
ANION GAP SERPL CALC-SCNC: 10 MMOL/L (ref 0–18)
AST SERPL-CCNC: 17 U/L (ref ?–34)
BASOPHILS # BLD AUTO: 0.05 X10(3) UL (ref 0–0.2)
BASOPHILS NFR BLD AUTO: 0.9 %
BILIRUB SERPL-MCNC: 0.6 MG/DL (ref 0.2–1.1)
BUN BLD-MCNC: 20 MG/DL (ref 9–23)
BUN/CREAT SERPL: 21.5 (ref 10–20)
CALCIUM BLD-MCNC: 10.3 MG/DL (ref 8.7–10.4)
CHLORIDE SERPL-SCNC: 105 MMOL/L (ref 98–112)
CHOLEST SERPL-MCNC: 188 MG/DL (ref ?–200)
CO2 SERPL-SCNC: 26 MMOL/L (ref 21–32)
CREAT BLD-MCNC: 0.93 MG/DL
DEPRECATED RDW RBC AUTO: 42.8 FL (ref 35.1–46.3)
EGFRCR SERPLBLD CKD-EPI 2021: 67 ML/MIN/1.73M2 (ref 60–?)
EOSINOPHIL # BLD AUTO: 0.28 X10(3) UL (ref 0–0.7)
EOSINOPHIL NFR BLD AUTO: 4.9 %
ERYTHROCYTE [DISTWIDTH] IN BLOOD BY AUTOMATED COUNT: 12.8 % (ref 11–15)
EST. AVERAGE GLUCOSE BLD GHB EST-MCNC: 111 MG/DL (ref 68–126)
FASTING PATIENT LIPID ANSWER: YES
FASTING STATUS PATIENT QL REPORTED: YES
GLOBULIN PLAS-MCNC: 2.9 G/DL (ref 2.8–4.4)
GLUCOSE BLD-MCNC: 120 MG/DL (ref 70–99)
HBA1C MFR BLD: 5.5 % (ref ?–5.7)
HCT VFR BLD AUTO: 39.2 %
HDLC SERPL-MCNC: 68 MG/DL (ref 40–59)
HGB BLD-MCNC: 12.9 G/DL
IMM GRANULOCYTES # BLD AUTO: 0.01 X10(3) UL (ref 0–1)
IMM GRANULOCYTES NFR BLD: 0.2 %
LDLC SERPL CALC-MCNC: 98 MG/DL (ref ?–100)
LYMPHOCYTES # BLD AUTO: 2.51 X10(3) UL (ref 1–4)
LYMPHOCYTES NFR BLD AUTO: 44.3 %
MCH RBC QN AUTO: 30.2 PG (ref 26–34)
MCHC RBC AUTO-ENTMCNC: 32.9 G/DL (ref 31–37)
MCV RBC AUTO: 91.8 FL
MONOCYTES # BLD AUTO: 0.37 X10(3) UL (ref 0.1–1)
MONOCYTES NFR BLD AUTO: 6.5 %
NEUTROPHILS # BLD AUTO: 2.44 X10 (3) UL (ref 1.5–7.7)
NEUTROPHILS # BLD AUTO: 2.44 X10(3) UL (ref 1.5–7.7)
NEUTROPHILS NFR BLD AUTO: 43.2 %
NONHDLC SERPL-MCNC: 120 MG/DL (ref ?–130)
OSMOLALITY SERPL CALC.SUM OF ELEC: 296 MOSM/KG (ref 275–295)
PLATELET # BLD AUTO: 348 10(3)UL (ref 150–450)
POTASSIUM SERPL-SCNC: 4.3 MMOL/L (ref 3.5–5.1)
PROT SERPL-MCNC: 7.8 G/DL (ref 5.7–8.2)
RBC # BLD AUTO: 4.27 X10(6)UL
SODIUM SERPL-SCNC: 141 MMOL/L (ref 136–145)
TRIGL SERPL-MCNC: 124 MG/DL (ref 30–149)
TSI SER-ACNC: 1.31 MIU/ML (ref 0.55–4.78)
VLDLC SERPL CALC-MCNC: 20 MG/DL (ref 0–30)
WBC # BLD AUTO: 5.7 X10(3) UL (ref 4–11)

## 2024-01-27 PROCEDURE — 84443 ASSAY THYROID STIM HORMONE: CPT

## 2024-01-27 PROCEDURE — 83036 HEMOGLOBIN GLYCOSYLATED A1C: CPT

## 2024-01-27 PROCEDURE — 80053 COMPREHEN METABOLIC PANEL: CPT

## 2024-01-27 PROCEDURE — 85025 COMPLETE CBC W/AUTO DIFF WBC: CPT

## 2024-01-27 PROCEDURE — 36415 COLL VENOUS BLD VENIPUNCTURE: CPT

## 2024-01-27 PROCEDURE — 80061 LIPID PANEL: CPT

## 2024-03-04 ENCOUNTER — TELEPHONE (OUTPATIENT)
Dept: INTERNAL MEDICINE CLINIC | Facility: CLINIC | Age: 69
End: 2024-03-04

## 2024-03-04 DIAGNOSIS — F41.1 GAD (GENERALIZED ANXIETY DISORDER): ICD-10-CM

## 2024-03-06 NOTE — TELEPHONE ENCOUNTER
Please review; protocol failed/ has no protocol    Requested Prescriptions   Pending Prescriptions Disp Refills    ALPRAZolam 0.25 MG Oral Tab 60 tablet 1     Sig: Take 1 tablet (0.25 mg total) by mouth 2 (two) times daily.       Controlled Substance Medication Failed - 3/4/2024  5:02 PM        Failed - This medication is a controlled substance - forward to provider to refill           Recent Outpatient Visits              2 months ago [facility] behavior [facility]    [facility]    5 months ago [facility] for annual health examination    [facility]    Office Visit    6 months ago Acute bacterial rhinosinusitis    [facility]    Office Visit    1 year ago Medicare annual wellness visit, subsequent    [facility]    Office Visit    1 year ago Glen[facility]umeral arthritis, right    [facility]    [facility]          Future Appointments         Provider Department Appt Notes    In 3 months Nataly Baker MD [facility], [place] [facility], F/U AKs

## 2024-03-07 RX ORDER — ALPRAZOLAM 0.25 MG/1
0.25 TABLET ORAL 2 TIMES DAILY
Qty: 60 TABLET | Refills: 1 | Status: SHIPPED | OUTPATIENT
Start: 2024-03-07

## 2024-03-07 NOTE — TELEPHONE ENCOUNTER
Please advise patient that she needs an appointment with Dr. Gutierrez for follow up and future refills of alprazolam.

## 2024-03-11 DIAGNOSIS — E78.5 HYPERLIPIDEMIA, UNSPECIFIED HYPERLIPIDEMIA TYPE: ICD-10-CM

## 2024-03-11 RX ORDER — ATORVASTATIN CALCIUM 20 MG/1
20 TABLET, FILM COATED ORAL EVERY EVENING
Qty: 90 TABLET | Refills: 1 | Status: SHIPPED | OUTPATIENT
Start: 2024-03-11

## 2024-03-11 NOTE — TELEPHONE ENCOUNTER
Refill passed per protocol.    Pt wants at different pharmacy    Requested Prescriptions   Pending Prescriptions Disp Refills    atorvastatin 20 MG Oral Tab 90 tablet 3     Sig: Take 1 tablet (20 mg total) by mouth every evening.       Cholesterol Medication Protocol Passed - 3/11/2024  7:31 AM        Passed - ALT < 80     Lab Results   Component Value Date    ALT 19 01/27/2024             Passed - ALT resulted within past year        Passed - Lipid panel within past 12 months     Lab Results   Component Value Date    CHOLEST 188 01/27/2024    TRIG 124 01/27/2024    HDL 68 (H) 01/27/2024    LDL 98 01/27/2024    VLDL 20 01/27/2024    NONHDLC 120 01/27/2024             Passed - In person appointment or virtual visit in the past 12 mos or appointment in next 3 mos     Recent Outpatient Visits              2 months ago [facility] behavior [facility]    [facility]    5 months [facility]    [facility]    6 months ago [facility]osin[facility]    [facility]    1 year ago Medicare annual wellness visit, subsequent    [facility] Faraz Nicolas MD    Office Visit    1 year ago Glen[facility]umeral arthritis, right    [facility]    [facility]          Future Appointments         Provider Department Appt Notes    In 3 months Nataly Baker MD [facility], [place] [facility], F/U AKs                    Future Appointments         Provider Department Appt Notes    In 3 months Nataly Baker MD [facility], [place] [facility], F/U AKs          Recent Outpatient Visits               2 months ago Neoplasm of uncertain behavior of skin    [facility], Nataly Zamudio MD    Office Visit    5 months ago Encounter for annual health examination    [facility]    Office Visit    6 months ago Acute bacterial rhinosinusitis    [facility]    Office Visit    1 year ago Medicare annual wellness visit, subsequent    [facility]    Office Visit    1 year ago Glenohumeral arthritis, right    [facility], Jeremy Cole MD    Office Visit

## 2024-03-27 ENCOUNTER — TELEPHONE (OUTPATIENT)
Dept: INTERNAL MEDICINE CLINIC | Facility: CLINIC | Age: 69
End: 2024-03-27

## 2024-03-27 ENCOUNTER — OFFICE VISIT (OUTPATIENT)
Dept: INTERNAL MEDICINE CLINIC | Facility: CLINIC | Age: 69
End: 2024-03-27

## 2024-03-27 VITALS
HEIGHT: 60 IN | OXYGEN SATURATION: 96 % | SYSTOLIC BLOOD PRESSURE: 145 MMHG | BODY MASS INDEX: 26.31 KG/M2 | HEART RATE: 64 BPM | DIASTOLIC BLOOD PRESSURE: 82 MMHG | WEIGHT: 134 LBS

## 2024-03-27 DIAGNOSIS — M79.645 BILATERAL THUMB PAIN: ICD-10-CM

## 2024-03-27 DIAGNOSIS — R40.0 HAS DAYTIME DROWSINESS: Primary | ICD-10-CM

## 2024-03-27 DIAGNOSIS — R53.83 FATIGUE, UNSPECIFIED TYPE: ICD-10-CM

## 2024-03-27 DIAGNOSIS — M54.42 ACUTE LEFT-SIDED LOW BACK PAIN WITH LEFT-SIDED SCIATICA: ICD-10-CM

## 2024-03-27 DIAGNOSIS — M79.644 BILATERAL THUMB PAIN: ICD-10-CM

## 2024-03-27 PROCEDURE — 3077F SYST BP >= 140 MM HG: CPT

## 2024-03-27 PROCEDURE — 1125F AMNT PAIN NOTED PAIN PRSNT: CPT

## 2024-03-27 PROCEDURE — 1160F RVW MEDS BY RX/DR IN RCRD: CPT

## 2024-03-27 PROCEDURE — 99214 OFFICE O/P EST MOD 30 MIN: CPT

## 2024-03-27 PROCEDURE — 1159F MED LIST DOCD IN RCRD: CPT

## 2024-03-27 PROCEDURE — 3008F BODY MASS INDEX DOCD: CPT

## 2024-03-27 PROCEDURE — 3079F DIAST BP 80-89 MM HG: CPT

## 2024-03-27 RX ORDER — METHYLPREDNISOLONE 4 MG/1
TABLET ORAL
Qty: 1 EACH | Refills: 0 | Status: SHIPPED | OUTPATIENT
Start: 2024-03-27

## 2024-03-27 NOTE — PROGRESS NOTES
Subjective:   Ashli Lange is a 68 year old female who presents for Leg Pain (Has been having a constant pain that starts on lower left side of back and goes down to the bottom of her leg), Follow - Up (Would like to follow up on blood work to see why she is always tired), and Hand Pain (Has been experiencing soreness in both hands past the wrist)     C/c pain from left lower back down to buttocks down posterior leg to the calf for the past 3 months and getting steadily worse   Worse with standing for long periods   Can get up to 8/10 in severity   If she curls up in fetal position this helps the pain   Has numbness which comes and goes   No history of sciatica but had lower back pain in the past   Maybe history of spinal stenosis? Patient unsure  Having urge incontinence and has a hard time holding - this has been going on for several months, no stress incontinence and no bowel incontinence or saddle anesthesia  No injury, falls, trauma to the back  No new or unusual activities     Sleeps about 7 hours   Feels tired all the time and could fall asleep during the day   Sleeping until 8am on the weekend which is very late for her  Goes to the store and while driving home having head nods and has to take a nap when she gets home. Still wakes up tired after the nap   has told her she snores at times     Also c/o right wrist pain radiating up into the forearm, also has this to a lesser extent on the left wrist and base of both thumbs  Using menthol over the counter gel   Pain is worse in the evening and also notes  strength seems weaker, buttoning her shirt is difficult due to pain, feels due to coordination problem and pain  Trouble grasping and has dropped objects when the pain comes on   She does lots of typing throughout the day    History/Other:    Chief Complaint Reviewed and Verified  Nursing Notes Reviewed and   Verified  Tobacco Reviewed  Allergies Reviewed  Medications Reviewed    OB Status  Reviewed         Tobacco:  She smoked tobacco in the past but quit greater than 12 months ago.  Social History    Tobacco Use      Smoking status: Former        Types: Cigarettes        Quit date: 1/10/1990        Years since quittin.2      Smokeless tobacco: Never       Current Outpatient Medications   Medication Sig Dispense Refill    methylPREDNISolone (MEDROL) 4 MG Oral Tablet Therapy Pack As directed. 1 each 0    atorvastatin 20 MG Oral Tab Take 1 tablet (20 mg total) by mouth every evening. 90 tablet 1    ALPRAZolam 0.25 MG Oral Tab Take 1 tablet (0.25 mg total) by mouth 2 (two) times daily. 60 tablet 1    amLODIPine 5 MG Oral Tab Take 1 tablet (5 mg total) by mouth daily. 90 tablet 3    Fosinopril Sodium-HCTZ 20-12.5 MG Oral Tab Take 1 tablet by mouth daily. 90 tablet 1       Review of Systems:  Review of Systems   Constitutional:  Positive for fatigue.   Respiratory: Negative.     Cardiovascular: Negative.    Gastrointestinal: Negative.    Musculoskeletal:  Positive for arthralgias and back pain.   Skin: Negative.    Neurological:  Positive for numbness.       Objective:   /81   Pulse 64   Ht 5' (1.524 m)   Wt 134 lb (60.8 kg)   SpO2 96%   BMI 26.17 kg/m²  Estimated body mass index is 26.17 kg/m² as calculated from the following:    Height as of this encounter: 5' (1.524 m).    Weight as of this encounter: 134 lb (60.8 kg).  Physical Exam  Vitals reviewed.   Constitutional:       General: She is not in acute distress.     Appearance: Normal appearance. She is well-developed.   Cardiovascular:      Rate and Rhythm: Normal rate and regular rhythm.      Heart sounds: Normal heart sounds.   Pulmonary:      Effort: Pulmonary effort is normal.      Breath sounds: Normal breath sounds.   Abdominal:      General: Bowel sounds are normal.      Palpations: Abdomen is soft.   Musculoskeletal:      Thoracic back: No bony tenderness.      Lumbar back: No bony tenderness. Negative right straight leg raise  test and negative left straight leg raise test.      Comments: Negative Finkelstein test bilaterally   Skin:     General: Skin is warm and dry.   Neurological:      Mental Status: She is alert and oriented to person, place, and time.       Assessment & Plan:   1. Has daytime drowsiness (Primary)  -     Diagnostic Sleep Study  -     General sleep study; Future; Expected date: 04/27/2024  2. Fatigue, unspecified type  -     Vitamin D; Future; Expected date: 03/27/2024  -     Vitamin B12; Future; Expected date: 03/27/2024  3. Bilateral thumb pain  Start using a wrist brace at night, use voltaren gel, if no improvement then see hand specialist   4. Acute left-sided low back pain with left-sided sciatica  -     XR LUMBAR SPINE (MIN 4 VIEWS) (CPT=72110); Future; Expected date: 03/27/2024  -     methylPREDNISolone; As directed.  Dispense: 1 each; Refill: 0  -     Physical Therapy Referral - [facility]    TETO Blanco, 3/27/2024, 6:20 PM

## 2024-03-27 NOTE — TELEPHONE ENCOUNTER
Spoke to pt, confirmed name and , called to confirm the reason for visit due to multiple appts for the same reason, and then confirmed we will discuss new reason for visit at time of appt

## 2024-03-30 ENCOUNTER — HOSPITAL ENCOUNTER (OUTPATIENT)
Dept: GENERAL RADIOLOGY | Age: 69
Discharge: HOME | End: 2024-03-30
Payer: MEDICARE

## 2024-03-30 ENCOUNTER — LAB ENCOUNTER (OUTPATIENT)
Dept: LAB | Age: 69
End: 2024-03-30
Payer: MEDICARE

## 2024-03-30 DIAGNOSIS — M54.42 ACUTE LEFT-SIDED LOW BACK PAIN WITH LEFT-SIDED SCIATICA: ICD-10-CM

## 2024-03-30 DIAGNOSIS — R53.83 FATIGUE, UNSPECIFIED TYPE: ICD-10-CM

## 2024-03-30 LAB
VIT B12 SERPL-MCNC: 778 PG/ML (ref 211–911)
VIT D+METAB SERPL-MCNC: 37.4 NG/ML (ref 30–100)

## 2024-03-30 PROCEDURE — 36415 COLL VENOUS BLD VENIPUNCTURE: CPT

## 2024-03-30 PROCEDURE — 82607 VITAMIN B-12: CPT

## 2024-03-30 PROCEDURE — 72110 X-RAY EXAM L-2 SPINE 4/>VWS: CPT

## 2024-03-30 PROCEDURE — 82306 VITAMIN D 25 HYDROXY: CPT

## 2024-04-11 NOTE — TELEPHONE ENCOUNTER
Please review. Protocol Failed; No Protocol    Requested Prescriptions   Pending Prescriptions Disp Refills    FOSINOPRIL SODIUM-HCTZ 20-12.5 MG Oral Tab [Pharmacy Med Name: FOSINOPRIL SODIUM/HYDROCHLOROTHIAZIDE 20-12.5 MG Tablet] 90 tablet 3     Sig: TAKE 1 TABLET EVERY DAY       Hypertension Medications Protocol Failed - 4/11/2024  1:05 AM        Failed - Last BP reading less than 140/90     BP Readings from Last 1 Encounters:   03/27/24 145/82               Passed - CMP or BMP in past 12 months        Passed - In person appointment or virtual visit in the past 12 mos or appointment in next 3 mos     Recent Outpatient Visits              2 weeks ago [facility]    [facility] Visit    4 months ago Neoplasm of uncertain behavior of skin    [facility]    Office Visit    6 months ago Encounter for annual health examination    [facility]    Office Visit    7 months ago Acute bacterial rhinosinusitis    [facility]    Office Visit    1 year ago Medicare annual wellness visit, subsequent    [facility]    [facility] Visit          Future Appointments         Provider Department Appt Notes    [facility]                    Passed - EGFRCR or GFRNAA > 50     GFR Evaluation  EGFRCR: 67 , resulted on 1/27/2024                 Future Appointments         Provider Department Appt Notes    In 2 months [facility]          Recent Outpatient Visits              2 weeks ago Has daytime drowsiness    [facility]    Office Visit    4 months ago Neoplasm of uncertain behavior of skin    [facility]    Office Visit    6 months ago [facility] for annual health examination    [facility]    Office Visit    7 months ago Acute bacterial rhinosinusitis    [facility]    Office Visit    1 year ago Medicare annual wellness visit, subsequent    [facility]    Office Visit

## 2024-04-12 DIAGNOSIS — F41.1 GAD (GENERALIZED ANXIETY DISORDER): ICD-10-CM

## 2024-04-12 RX ORDER — FOSINOPRIL SODIUM AND HYDROCHLOROTHIAZIDE 20; 12.5 MG/1; MG/1
1 TABLET ORAL DAILY
Qty: 90 TABLET | Refills: 3 | Status: SHIPPED | OUTPATIENT
Start: 2024-04-12

## 2024-04-13 NOTE — TELEPHONE ENCOUNTER
Controlled medication pending for review.  If approved needs to be called in or faxed by on-site staff.    Last Rx: 3/7/24  LOV: 3/27/24

## 2024-04-16 ENCOUNTER — PATIENT MESSAGE (OUTPATIENT)
Dept: INTERNAL MEDICINE CLINIC | Facility: CLINIC | Age: 69
End: 2024-04-16

## 2024-04-17 RX ORDER — ALPRAZOLAM 0.25 MG/1
0.25 TABLET ORAL 2 TIMES DAILY
Qty: 60 TABLET | Refills: 1 | Status: SHIPPED | OUTPATIENT
Start: 2024-04-17

## 2024-04-17 NOTE — TELEPHONE ENCOUNTER
From: Ashli Lange  To: Flora Schneider  Sent: 4/16/2024 5:23 PM CDT  Subject: APPOINTMENT    Jesus Manuel Hines,  Can you please prescribe a prescription for physical therapy. The steriods did help some but the pain has worsened since I finished the prescription.     Thank you    Ashli

## 2024-04-22 ENCOUNTER — TELEPHONE (OUTPATIENT)
Dept: INTERNAL MEDICINE CLINIC | Facility: CLINIC | Age: 69
End: 2024-04-22

## 2024-04-25 ENCOUNTER — TELEPHONE (OUTPATIENT)
Dept: PHYSICAL THERAPY | Facility: HOSPITAL | Age: 69
End: 2024-04-25

## 2024-05-02 ENCOUNTER — OFFICE VISIT (OUTPATIENT)
Dept: PHYSICAL THERAPY | Age: 69
End: 2024-05-02
Payer: MEDICARE

## 2024-05-02 DIAGNOSIS — M54.42 ACUTE LEFT-SIDED LOW BACK PAIN WITH LEFT-SIDED SCIATICA: Primary | ICD-10-CM

## 2024-05-02 PROCEDURE — 97162 PT EVAL MOD COMPLEX 30 MIN: CPT | Performed by: PHYSICAL THERAPIST

## 2024-05-02 PROCEDURE — 97110 THERAPEUTIC EXERCISES: CPT | Performed by: PHYSICAL THERAPIST

## 2024-05-02 NOTE — PROGRESS NOTES
SPINE EVALUATION:     Diagnosis:   Acute left-sided low back pain with left-sided sciatica (M54.42)       Referring Provider: Flora IRENE Date of Evaluation:    5/2/2024    Precautions:  None Next MD visit:   none scheduled  Date of Surgery: n/a     PATIENT SUMMARY   Ashli Lange is a 68 year old female who presents to therapy today with complaints of pain at the lower back. Starting 2 months ago - no noted injury. Has had back issues a couple of times with some injections (5 years ago), no leg pain  States during the day will get pain down back of the leg is standing longer times.  States worse pain is at night- can't find any position and even the recliner didn't help.  Took a dose of prednisone which did not help - states finished that 3 weeks.  With sitting in lower back, does  report stiffness getting up from sitting , walking is best position pressure only at back  Pt describes pain level current 6/10, at best 2/10, at worst 8-9/10 at night,.   Current functional limitations include sitting longer times, ability to sleep at night, pain with bending- increases leg pain of tingling. Gardening,  - takes several hours to sleep at night and then only sleeps about 2 1/2 hours.     Ashli describes prior level of function no problems prior to 2 months ago. Pt goals include 1. Get rid of the pain, be able to sleep.  Past medical history was reviewed with Ashli.   Pt denies diplopia, dysarthria, dysphasia, dizziness, drop attacks, bowel/bladder changes, saddle anesthesia, and ROSINA LE N/T.    ASSESSMENT  Ashli presents to physical therapy evaluation with primary c/o LBP and posterior leg pain. The results of the objective tests and measures show directional preference to flexion unloaded but relief noted also with seated flexion, radicular symptoms, impaired LE strength L to R, Impaired lumbar mobility most noted extension and c/o pain.  Functional deficits include but are not limited to sleeping  at night, standing longer times, back pain sitting.   Pt and PT discussed evaluation findings, pathology, POC and HEP.  Pt voiced understanding and performs HEP correctly without reported pain. Skilled Physical Therapy is medically necessary to address the above impairments and reach functional goals.     OBJECTIVE:   Observation/Posture: frwd head/shoulder, fair to poor sitting posture.         Lumbar  AROM: (* denotes performed with pain)  Flexion: mild LOM  Extension: mod LOM  Baseline: LBP, L posterior calf.  Repeated flexion- I/W calf, NE back  Repeated extension over fulcrum - NE  back, NE calf  Prone lying - L lower back pain, L posterior calf.  MAYITO - I/W leg , I/NW back    Supine - baseline:  LB pain, L posterior calf  Repeated knees to chest - Abolish calf, D/B back,   Repeated flexion in sitting - D/B back.   Supine - flossing L 90/90   Seated piriformis stretch.         Strength: (* denotes performed with pain)  LE   Hip flexion (L2): R 5/5; L 5/5  Hip abduction: R 4/5; L 4/5  Hip Extension: R 5/5; L 4+/5   Hip ER: R 5/5; L 5/5  Hip IR: R 5/5; L 5/5  Knee Flexion: R 5/5; L 5/5   Knee extension (L3): R 5/5; L 4+/5   DF (L4): R 5/5; L 5/5  Great Toe Ext (L5): R 5/5, L 5/5  PF (S1): R 4/5; L 4/5     Flexibility:   LE   Hip Flexor: RWNL, L WNL  Hamstrings: R -45- pain at back and HS ; L -10  Piriformis: L > R restriction  Quads: NT  Gastroc-soleus: NT         Gait: pt ambulates on level ground with normal mechanics.  Balance: NT    Today’s Treatment and Response:   Pt education was provided on exam findings, treatment diagnosis, treatment plan, expectations, and prognosis. Patient was instructed in and issued a HEP for: DKTC , seated flexion, flossing HS , piriformis stretch L sitting. Instruction to patient with exercises to assess baseline and when to continue the exercises or stop the exercises.     Charges: PT Eval Moderate Complexity, Ex 1      Total Timed Treatment: 45 min     Total Treatment Time: 45  min     Based on clinical rationale and outcome measures, this evaluation involved Moderate Complexity decision making due to 1-2 personal factors/comorbidities, 3 body structures involved/activity limitations, and evolving symptoms including changing pain levels.  PLAN OF CARE:    Goals: (to be met in 8-12 visits)   Patient to be independent with HEP  Patient to report ability to sleep through night without back pain affecting her sleep.   Patient to have no pain into her leg with daily activities.   Patient to report no pain at lower back with daily activities.     Frequency / Duration: Patient will be seen for 1-2 x/week or a total of 8-12 visits over a 90 day period. Treatment will include: Manual Therapy, Therapeutic Exercise, and Home Exercise Program instruction    Education or treatment limitation: None  Rehab Potential:good        Patient was advised of these findings, precautions, and treatment options and has agreed to actively participate in planning and for this course of care.    Thank you for your referral. Please co-sign or sign and return this letter via fax as soon as possible to [phone]. If you have any questions, please contact me at Dept: [phone]    Sincerely,  Electronically signed by therapist: Olamide Nixon PT    Physician's certification required: Yes  I certify the need for these services furnished under this plan of treatment and while under my care.    X___________________________________________________ Date____________________    Certification From: 5/2/2024  To:7/31/2024

## 2024-05-08 ENCOUNTER — APPOINTMENT (OUTPATIENT)
Dept: PHYSICAL THERAPY | Age: 69
End: 2024-05-08
Payer: MEDICARE

## 2024-05-09 ENCOUNTER — OFFICE VISIT (OUTPATIENT)
Dept: PHYSICAL THERAPY | Age: 69
End: 2024-05-09
Payer: MEDICARE

## 2024-05-09 PROCEDURE — 97110 THERAPEUTIC EXERCISES: CPT | Performed by: PHYSICAL THERAPIST

## 2024-05-09 NOTE — PROGRESS NOTES
Diagnosis:   Acute left-sided low back pain with left-sided sciatica (M54.42)       Referring Provider: TETO Blanco Date of Evaluation:    5/2/2024    Precautions:  None Next MD visit:   none scheduled  Date of Surgery: n/a   Insurance Primary/Secondary: [employer] [facility] / N/A     # Auth Visits: 2            Subjective: Patient reports the leg is feeling much better.  States has been doing the exercises and around 4 oclock now it starts.  States back only at night.  States was constant all the time, now starts at 4     Pain: 4/10 lower back .  L lateral leg pain  start of session  End of session leg abolished, low back 2/10.       Objective: see outpatient daily record.       Assessment: Patient continues to respond to flexion based exercises.  Changed to more sustained holds now which was more affective with symptoms.  Pain abolished at back and leg with flexed position, D/B back following and abolished leg symptoms.  Did add core strengthening/stabilization work this session.  Corrected for long to short L leg - long sitting - NE on symptoms - in alignment following correction.       Goals:   Goals: (to be met in 8-12 visits)   Patient to be independent with HEP  Patient to report ability to sleep through night without back pain affecting her sleep.   Patient to have no pain into her leg with daily activities.   Patient to report no pain at lower back with daily activities.     Plan: Patient to utilize more sustained flexion holds, add in core strengthening.    Date: 5/9/2024  TX#: 2/8   Standing baseline:  pain lower L back, pain side of leg to ankle-  DKTC with sustained holds of 1 min- abolish back, D/B back, 1 min hold D/B back,  1 1/2 min hold D/B back   Repeated DKTC x 5  Piriformis stretch 3 x 30 seconds supine  Flossing L leg x 10  Core - abd bracing x 10  Abd bracing with march x 10  Abd bracing with add with ball x 10  Abd bracing with hip abd with blue band x 10    DKTC x 1 minute.               HEP:  sustained DKTC, core strengthening. .     Charges: Ex 3       Total Timed Treatment: 40 min  Total Treatment Time: 40 min

## 2024-05-13 ENCOUNTER — OFFICE VISIT (OUTPATIENT)
Dept: PHYSICAL THERAPY | Age: 69
End: 2024-05-13
Payer: MEDICARE

## 2024-05-13 ENCOUNTER — APPOINTMENT (OUTPATIENT)
Dept: PHYSICAL THERAPY | Age: 69
End: 2024-05-13
Payer: MEDICARE

## 2024-05-13 ENCOUNTER — TELEPHONE (OUTPATIENT)
Dept: PHYSICAL THERAPY | Facility: HOSPITAL | Age: 69
End: 2024-05-13

## 2024-05-13 DIAGNOSIS — M54.42 ACUTE LEFT-SIDED LOW BACK PAIN WITH LEFT-SIDED SCIATICA: Primary | ICD-10-CM

## 2024-05-13 PROCEDURE — 97530 THERAPEUTIC ACTIVITIES: CPT | Performed by: PHYSICAL THERAPIST

## 2024-05-13 PROCEDURE — 97110 THERAPEUTIC EXERCISES: CPT | Performed by: PHYSICAL THERAPIST

## 2024-05-13 NOTE — PROGRESS NOTES
Diagnosis:   Acute left-sided low back pain with left-sided sciatica (M54.42)       Referring Provider: TETO Blanco Date of Evaluation:    5/2/2024    Precautions:  None Next MD visit:   none scheduled  Date of Surgery: n/a   Insurance Primary/Secondary: [employer] [facility] / N/A     # Auth Visits: (#3/8; 5/2/24-8/2/24)            Subjective: Pt reports \"the exercises are helping and doing really well; however, I had spasm in my left inner thigh and it has been sore all weekend.\"    Pain: 3-4/10      Objective: See table below.      Assessment: Pt responded well to therapeutic interventions with no increase in pain. Did not perform any adduction exercises due to spasms and pain. Added abdominal bracing with heel slides. Instructed pt to avoid exercises that activate adductors until next session and recommended Pedialyte for spasms as well as massage stick for adductor soreness.      Goals:   Goals: (to be met in 8-12 visits)   Patient to be independent with HEP  Patient to report ability to sleep through night without back pain affecting her sleep.   Patient to have no pain into her leg with daily activities.   Patient to report no pain at lower back with daily activities.     Plan: Patient to utilize more sustained flexion holds, add in core strengthening.    Date: 5/9/2024  TX#: 2/8 Date: 5/13/2024  TX#: 3/8   Standing baseline:  pain lower L back, pain side of leg to ankle-  DKTC with sustained holds of 1 min- abolish back, D/B back, 1 min hold D/B back,  1 1/2 min hold D/B back   Repeated DKTC x 5  Piriformis stretch 3 x 30 seconds supine  Flossing L leg x 10  Core - abd bracing x 10  Abd bracing with march x 10  Abd bracing with add with ball x 10  Abd bracing with hip abd with blue band x 10    DKTC x 1 minute Therex:  DKTC with sustained holds of 1 min- abolish back, D/B back, 1 min hold D/B back,  1 1/2 min hold D/B back   Repeated DKTC x 5  Piriformis stretch 3 x 30 seconds supine  Adductor Stretch: 3x30\"  manually with massage stick to adductor  DKTC: x 1 minute at end of session    Theract:  Flossing L leg x 10  Core - abd bracing x 10  Abd bracing with march x 10  Abd bracing with abd with pelvic ring x 10  Abd bracing with heel slide x 10           HEP:  Exercises:  DKTC, Seated Lumbar Flexion, Sciatic Nerve Flossing, Seated Piriformis Stretch L   Charges: Therex: 10 minutes  Theract: 30 minutes  NMR: 0 minutes  Manual Therapy: 0 minutes      Total Timed Treatment: 40 min  Total Treatment Time: 40 min  *MC - 3 UNITS

## 2024-05-15 ENCOUNTER — APPOINTMENT (OUTPATIENT)
Dept: PHYSICAL THERAPY | Age: 69
End: 2024-05-15
Payer: MEDICARE

## 2024-05-15 DIAGNOSIS — F41.1 GAD (GENERALIZED ANXIETY DISORDER): ICD-10-CM

## 2024-05-15 NOTE — PROGRESS NOTES
Diagnosis:   Acute left-sided low back pain with left-sided sciatica (M54.42)       Referring Provider: TETO Blanco Date of Evaluation:    5/2/2024    Precautions:  None Next MD visit:   none scheduled  Date of Surgery: n/a   Insurance Primary/Secondary: [employer]NA [facility] / N/A     # Auth Visits: (#4/8; 5/2/24-8/2/24)            Subjective: Pt reports \"the back is feeling so-so, the spasm that I felt inside of the leg is better but the muscle still is a little sore, I still have a difficult time sleeping due to my back pain and the back continues to feel painful around 4 pm.\"    Pain: 3-4/10      Objective: See table below.      Assessment: Pt responded well to therapeutic interventions with no increase in pain. Did not perform any adduction exercises due to spasms and pain. Educated pt on sitting and sleeping positions to help with back pain including using a lumbar support and placing pillows underneath knees to help with opening up disc spaces. Added sciatic nerve flossing in a seated position to help with radiculopathy.     Goals:   Goals: (to be met in 8-12 visits)   Patient to be independent with HEP  Patient to report ability to sleep through night without back pain affecting her sleep.   Patient to have no pain into her leg with daily activities.   Patient to report no pain at lower back with daily activities.     Plan: Patient to utilize more sustained flexion holds, add in core strengthening.    Date: 5/9/2024  TX#: 2/8 Date: 5/13/2024  TX#: 3/8 Date: 5/16/2024  TX#: 4/8   Standing baseline:  pain lower L back, pain side of leg to ankle-  DKTC with sustained holds of 1 min- abolish back, D/B back, 1 min hold D/B back,  1 1/2 min hold D/B back   Repeated DKTC x 5  Piriformis stretch 3 x 30 seconds supine  Flossing L leg x 10  Core - abd bracing x 10  Abd bracing with march x 10  Abd bracing with add with ball x 10  Abd bracing with hip abd with blue band x 10    DKTC x 1 minute Therex:  DKTC with sustained  holds of 1 min- abolish back, D/B back, 1 min hold D/B back,  1 1/2 min hold D/B back   Repeated DKTC x 5  Piriformis stretch 3 x 30 seconds supine  Adductor Stretch: 3x30\" manually with massage stick to adductor  DKTC: x 1 minute at end of session   Therex:  DKTC with sustained holds of 1 min- abolish back, D/B back, 1 min hold D/B back,  1 1/2 min hold D/B back   Repeated DKTC x 10  Piriformis stretch 2 x 30 seconds supine  Adductor Stretch: 3x30\" manually with massage stick to adductor  DKTC: X  Ball Rolls: 1x10 with 3\" hold in flexion    Theract:  Flossing L leg x 10 supine  Core - abd bracing: x10  Abd bracing with march x 15  Abd bracing with abd with pelvic ring x 15  Abd bracing with heel slide: x10   Theract:  Flossing L leg x 10 supine  Flossing BLEs: x10 seated  Core - abd bracing: X  Abd bracing with march x 10  Abd bracing with abd with pelvic ring x 10  Abd bracing with heel slide: X  Dead Bug with abd bracing: x 10 isometric  Pelvic Tilts: x15 seated             HEP:  Exercises:  DKTC, Seated Lumbar Flexion, Sciatic Nerve Flossing, Seated Piriformis Stretch L   Charges: Therex: 10 minutes  Theract: 30 minutes  NMR: 0 minutes  Manual Therapy: 0 minutes  Total Timed Treatment: 40 min  Total Treatment Time: 40 min  *MC - 3 UNITS

## 2024-05-16 ENCOUNTER — OFFICE VISIT (OUTPATIENT)
Dept: PHYSICAL THERAPY | Age: 69
End: 2024-05-16
Payer: MEDICARE

## 2024-05-16 DIAGNOSIS — M54.42 ACUTE LEFT-SIDED LOW BACK PAIN WITH LEFT-SIDED SCIATICA: Primary | ICD-10-CM

## 2024-05-16 PROCEDURE — 97530 THERAPEUTIC ACTIVITIES: CPT | Performed by: PHYSICAL THERAPIST

## 2024-05-16 PROCEDURE — 97110 THERAPEUTIC EXERCISES: CPT | Performed by: PHYSICAL THERAPIST

## 2024-05-20 ENCOUNTER — PATIENT MESSAGE (OUTPATIENT)
Dept: INTERNAL MEDICINE CLINIC | Facility: CLINIC | Age: 69
End: 2024-05-20

## 2024-05-20 DIAGNOSIS — F41.1 GAD (GENERALIZED ANXIETY DISORDER): ICD-10-CM

## 2024-05-20 RX ORDER — ALPRAZOLAM 0.25 MG/1
0.25 TABLET ORAL 2 TIMES DAILY
Qty: 60 TABLET | Refills: 1 | OUTPATIENT
Start: 2024-05-20

## 2024-05-20 NOTE — TELEPHONE ENCOUNTER
From: Ashli Lange  To: Faraz Gutierrez  Sent: 5/20/2024 6:56 AM CDT  Subject: REFILL    I called it on the 15th and still have not received my refill   Thank you  Ashli Lange

## 2024-05-21 DIAGNOSIS — F41.1 GAD (GENERALIZED ANXIETY DISORDER): ICD-10-CM

## 2024-05-22 ENCOUNTER — OFFICE VISIT (OUTPATIENT)
Dept: PHYSICAL THERAPY | Age: 69
End: 2024-05-22
Payer: MEDICARE

## 2024-05-22 DIAGNOSIS — M54.42 ACUTE LEFT-SIDED LOW BACK PAIN WITH LEFT-SIDED SCIATICA: Primary | ICD-10-CM

## 2024-05-22 PROCEDURE — 97530 THERAPEUTIC ACTIVITIES: CPT | Performed by: PHYSICAL THERAPIST

## 2024-05-22 NOTE — PROGRESS NOTES
Diagnosis:   Acute left-sided low back pain with left-sided sciatica (M54.42)       Referring Provider: TETO Blanco Date of Evaluation:    5/2/2024    Precautions:  None Next MD visit:   none scheduled  Date of Surgery: n/a   Insurance Primary/Secondary: [employer] [facility] / N/A     # Auth Visits: (#5/8; 5/2/24-8/2/24)            Subjective: Pt reports \"the back is doing better, my symptoms have decreased from my legs and are just present in my back and calf - did not have a chance to do my exercises today so I am feeling symptoms in the calf, still having discomfort at 4 pm.\"    Pain: 4/10      Objective: See table below.      Assessment: Pt responded well to therapeutic interventions with no increase in pain. Resumed heel slides with TA contraction to help with dynamic core strength. Due to decreased radiculopathy, exercises that were performed last session were continued today.    Goals:   Goals: (to be met in 8-12 visits)   Patient to be independent with HEP  Patient to report ability to sleep through night without back pain affecting her sleep.   Patient to have no pain into her leg with daily activities.   Patient to report no pain at lower back with daily activities.     Plan: Patient to utilize more sustained flexion holds, add in core strengthening.    Date: 5/9/2024  TX#: 2/8 Date: 5/13/2024  TX#: 3/8 Date: 5/16/2024  TX#: 4/8 Date: 5/22/2024  TX#: 5/8   Standing baseline:  pain lower L back, pain side of leg to ankle-  DKTC with sustained holds of 1 min- abolish back, D/B back, 1 min hold D/B back,  1 1/2 min hold D/B back   Repeated DKTC x 5  Piriformis stretch 3 x 30 seconds supine  Flossing L leg x 10  Core - abd bracing x 10  Abd bracing with march x 10  Abd bracing with add with ball x 10  Abd bracing with hip abd with blue band x 10    DKTC x 1 minute Therex:  DKTC with sustained holds of 1 min- abolish back, D/B back, 1 min hold D/B back,  1 1/2 min hold D/B back   Repeated DKTC x 5  Piriformis  stretch 3 x 30 seconds supine  Adductor Stretch: 3x30\" manually with massage stick to adductor  DKTC: x 1 minute at end of session   Therex:  DKTC with sustained holds of 1 min- abolish back, D/B back, 1 min hold D/B back,  1 1/2 min hold D/B back   Repeated DKTC x 15  Piriformis stretch 2 x 30 seconds supine  Adductor Stretch: 3x30\" manually with massage stick to adductor  DKTC: X  Ball Rolls: 1x10 with 3\" hold in flexion Therex: x10'  DKTC with sustained holds of 1 min- abolish back, D/B back, 1 min hold D/B back,  1 1/2 min hold D/B back   Repeated DKTC x 15  Piriformis stretch 2 x 30 seconds supine  Ball Rolls: 1x10 with 3\" hold in flexion    Theract:  Flossing L leg x 10 supine  Core - abd bracing: x10  Abd bracing with march x 15  Abd bracing with abd with pelvic ring x 15  Abd bracing with heel slide: x10   Theract:  Flossing L leg x 10 supine  Flossing BLEs: x10 seated  Core - abd bracing: X  Abd bracing with march x 10  Abd bracing with abd with pelvic ring x 10  Abd bracing with heel slide: X  Dead Bug with abd bracing: x 10 isometric  Pelvic Tilts: x15 seated Theract: x40'  Flossing L leg x 10 supine  Flossing BLEs: x10 seated  Core - abd bracing: X  Abd bracing with march x 10  Abd bracing with abd with pelvic ring x 15  Abd bracing with heel slide: x10 R/L  Dead Bug with abd bracing: x 10 isometric  Pelvic Tilts: x15 seated               HEP:  Exercises:  DKTC, Seated Lumbar Flexion, Sciatic Nerve Flossing, Seated Piriformis Stretch L   Charges: Therex: 10 minutes  Theract: 40 minutes  NMR: 0 minutes  Manual Therapy: 0 minutes  Total Timed Treatment: 50 min  Total Treatment Time: 50 min  *MC - 3 UNITS

## 2024-05-24 RX ORDER — ALPRAZOLAM 0.25 MG/1
0.25 TABLET ORAL 2 TIMES DAILY
Qty: 60 TABLET | Refills: 0 | OUTPATIENT
Start: 2024-05-24

## 2024-05-29 ENCOUNTER — OFFICE VISIT (OUTPATIENT)
Dept: PHYSICAL THERAPY | Age: 69
End: 2024-05-29
Payer: MEDICARE

## 2024-05-29 DIAGNOSIS — M54.42 ACUTE LEFT-SIDED LOW BACK PAIN WITH LEFT-SIDED SCIATICA: Primary | ICD-10-CM

## 2024-05-29 PROCEDURE — 97110 THERAPEUTIC EXERCISES: CPT | Performed by: PHYSICAL THERAPIST

## 2024-05-29 PROCEDURE — 97530 THERAPEUTIC ACTIVITIES: CPT | Performed by: PHYSICAL THERAPIST

## 2024-05-29 NOTE — PROGRESS NOTES
Diagnosis:   Acute left-sided low back pain with left-sided sciatica (M54.42)       Referring Provider: TETO Blanco Date of Evaluation:    5/2/2024    Precautions:  None Next MD visit:   none scheduled  Date of Surgery: n/a   Insurance Primary/Secondary: [employer]MANA [facility] / N/A     # Auth Visits: (#6/8; 5/2/24-8/2/24)            Subjective: Pt reports \"the back is doing better, my symptoms are no longer in my legs but the back is still bothering me - it still wakes me up at night - it still bothers me at 4 pm.\"    Pain: 4/10      Objective: See table below.      Assessment: Pt responded well to therapeutic interventions with no increase in pain. Pt was able to tolerate DKTC and flexion based exercises with no pain. Pt required VC and MC to help with core activation. Decreased sx noted after therapy session.    Goals:   Goals: (to be met in 8-12 visits)   Patient to be independent with HEP  Patient to report ability to sleep through night without back pain affecting her sleep.   Patient to have no pain into her leg with daily activities.   Patient to report no pain at lower back with daily activities.     Plan: Patient to utilize more sustained flexion holds, add in core strengthening.    Date: 5/9/2024  TX#: 2/8 Date: 5/13/2024  TX#: 3/8 Date: 5/16/2024  TX#: 4/8 Date: 5/22/2024  TX#: 5/8 Date: 5/29/2024  TX#: 6/8   Standing baseline:  pain lower L back, pain side of leg to ankle-  DKTC with sustained holds of 1 min- abolish back, D/B back, 1 min hold D/B back,  1 1/2 min hold D/B back   Repeated DKTC x 5  Piriformis stretch 3 x 30 seconds supine  Flossing L leg x 10  Core - abd bracing x 10  Abd bracing with march x 10  Abd bracing with add with ball x 10  Abd bracing with hip abd with blue band x 10    DKTC x 1 minute Therex:  DKTC with sustained holds of 1 min- abolish back, D/B back, 1 min hold D/B back,  1 1/2 min hold D/B back   Repeated DKTC x 5  Piriformis stretch 3 x 30 seconds supine  Adductor Stretch:  3x30\" manually with massage stick to adductor  DKTC: x 1 minute at end of session   Therex:  DKTC with sustained holds of 1 min- abolish back, D/B back, 1 min hold D/B back,  1 1/2 min hold D/B back   Repeated DKTC x 15  Piriformis stretch 2 x 30 seconds supine  Adductor Stretch: 3x30\" manually with massage stick to adductor  DKTC: X  Ball Rolls: 1x10 with 3\" hold in flexion Therex: x10'  DKTC with sustained holds of 1 min- abolish back, D/B back, 1 min hold D/B back,  1 1/2 min hold D/B back   Repeated DKTC x 15  Piriformis stretch 2 x 30 seconds supine  Ball Rolls: 1x10 with 3\" hold in flexion Therex: x10'  DKTC with sustained holds of 1 min- abolish back, D/B back, 1 min hold D/B back,  1 1/2 min hold D/B back   Repeated DKTC x 15  Piriformis stretch 2 x 30 seconds supine  Ball Rolls: 1x10 with 3\" hold in flexion    Theract:  Flossing L leg x 10 supine  Core - abd bracing: x10  Abd bracing with march x 15  Abd bracing with abd with pelvic ring x 15  Abd bracing with heel slide: x10   Theract:  Flossing L leg x 10 supine  Flossing BLEs: x10 seated  Core - abd bracing: X  Abd bracing with march x 10  Abd bracing with abd with pelvic ring x 10  Abd bracing with heel slide: X  Dead Bug with abd bracing: x 10 isometric  Pelvic Tilts: x15 seated Theract: x40'  Flossing L leg x 10 supine  Flossing BLEs: x10 seated  Core - abd bracing: X  Abd bracing with march x 10  Abd bracing with abd with pelvic ring x 15  Abd bracing with heel slide: x10 R/L  Dead Bug with abd bracing: x 10 isometric  Pelvic Tilts: x15 seated Theract: x40'  Flossing L leg x 10 supine  Flossing BLEs: x10 seated  Core - abd bracing: X  Abd bracing with march x 10  Abd bracing with abd with pelvic ring x 15  Abd bracing with heel slide: x10 R/L  Dead Bug with abd bracing: x 10 isometric  Pelvic Tilts: x15 seated                 HEP:  Exercises:  DKTC, Seated Lumbar Flexion, Sciatic Nerve Flossing, Seated Piriformis Stretch L   Charges: Therex: 10  minutes  Theract: 30 minutes  NMR: 0 minutes  Manual Therapy: 0 minutes  Total Timed Treatment: 40 min  Total Treatment Time: 40 min  *MC - 3 UNITS

## 2024-06-06 ENCOUNTER — APPOINTMENT (OUTPATIENT)
Dept: PHYSICAL THERAPY | Age: 69
End: 2024-06-06
Payer: MEDICARE

## 2024-06-19 ENCOUNTER — OFFICE VISIT (OUTPATIENT)
Dept: DERMATOLOGY CLINIC | Facility: CLINIC | Age: 69
End: 2024-06-19

## 2024-06-19 DIAGNOSIS — D23.9 BENIGN NEOPLASM OF SKIN, UNSPECIFIED LOCATION: ICD-10-CM

## 2024-06-19 DIAGNOSIS — L81.4 LENTIGO: ICD-10-CM

## 2024-06-19 DIAGNOSIS — L57.0 AK (ACTINIC KERATOSIS): ICD-10-CM

## 2024-06-19 DIAGNOSIS — L82.1 SK (SEBORRHEIC KERATOSIS): ICD-10-CM

## 2024-06-19 DIAGNOSIS — D48.5 NEOPLASM OF UNCERTAIN BEHAVIOR OF SKIN: Primary | ICD-10-CM

## 2024-06-19 DIAGNOSIS — D22.9 MULTIPLE NEVI: ICD-10-CM

## 2024-06-19 DIAGNOSIS — D23.9 SYRINGOMA: ICD-10-CM

## 2024-06-19 PROCEDURE — 1159F MED LIST DOCD IN RCRD: CPT | Performed by: DERMATOLOGY

## 2024-06-19 PROCEDURE — 99213 OFFICE O/P EST LOW 20 MIN: CPT | Performed by: DERMATOLOGY

## 2024-06-19 PROCEDURE — 88312 SPECIAL STAINS GROUP 1: CPT | Performed by: DERMATOLOGY

## 2024-06-19 PROCEDURE — 11102 TANGNTL BX SKIN SINGLE LES: CPT | Performed by: DERMATOLOGY

## 2024-06-19 PROCEDURE — 88305 TISSUE EXAM BY PATHOLOGIST: CPT | Performed by: DERMATOLOGY

## 2024-06-19 PROCEDURE — 1160F RVW MEDS BY RX/DR IN RCRD: CPT | Performed by: DERMATOLOGY

## 2024-06-24 ENCOUNTER — OFFICE VISIT (OUTPATIENT)
Dept: PHYSICAL THERAPY | Age: 69
End: 2024-06-24
Payer: MEDICARE

## 2024-06-24 DIAGNOSIS — M54.42 ACUTE LEFT-SIDED LOW BACK PAIN WITH LEFT-SIDED SCIATICA: Primary | ICD-10-CM

## 2024-06-24 PROCEDURE — 97110 THERAPEUTIC EXERCISES: CPT | Performed by: PHYSICAL THERAPIST

## 2024-06-24 PROCEDURE — 97530 THERAPEUTIC ACTIVITIES: CPT | Performed by: PHYSICAL THERAPIST

## 2024-06-24 NOTE — PROGRESS NOTES
Diagnosis:   Acute left-sided low back pain with left-sided sciatica (M54.42)       Referring Provider: TETO Blanco Date of Evaluation:    5/2/2024    Precautions:  None Next MD visit:   none scheduled  Date of Surgery: n/a   Insurance Primary/Secondary: HUMANA [facility] / N/A     # Auth Visits: (#7/8; 5/2/24-8/2/24)            Subjective: Pt reports \"the back is feeling better, I continue to do my exercises at home, less spasms.\"    Pain: 4/10     Discharge Summary  Pt has attended 7 visits in Physical Therapy. Pt has met all goals and is discharged from physical therapy. Pt given comprehensive HEP to perform continued strengthening at home.      Objective: See table below.      Assessment: Pt responded well to therapeutic interventions with no increase in pain. Continued with core strengthening and stretching to help with lumbar stability and LE strength.    Goals:   Goals: (to be met in 8-12 visits)   Patient to be independent with HEP  Patient to report ability to sleep through night without back pain affecting her sleep.   Patient to have no pain into her leg with daily activities.   Patient to report no pain at lower back with daily activities.     Plan: Patient to utilize more sustained flexion holds, add in core strengthening.    Date: 5/9/2024  TX#: 2/8 Date: 5/13/2024  TX#: 3/8 Date: 5/16/2024  TX#: 4/8 Date: 5/22/2024  TX#: 5/8 Date: 5/29/2024  TX#: 6/8 Date: 6/24/2024  TX#: 7/8   Standing baseline:  pain lower L back, pain side of leg to ankle-  DKTC with sustained holds of 1 min- abolish back, D/B back, 1 min hold D/B back,  1 1/2 min hold D/B back   Repeated DKTC x 5  Piriformis stretch 3 x 30 seconds supine  Flossing L leg x 10  Core - abd bracing x 10  Abd bracing with march x 10  Abd bracing with add with ball x 10  Abd bracing with hip abd with blue band x 10    DKTC x 1 minute Therex:  DKTC with sustained holds of 1 min- abolish back, D/B back, 1 min hold D/B back,  1 1/2 min hold D/B back    Repeated DKTC x 5  Piriformis stretch 3 x 30 seconds supine  Adductor Stretch: 3x30\" manually with massage stick to adductor  DKTC: x 1 minute at end of session   Therex:  DKTC with sustained holds of 1 min- abolish back, D/B back, 1 min hold D/B back,  1 1/2 min hold D/B back   Repeated DKTC x 15  Piriformis stretch 2 x 30 seconds supine  Adductor Stretch: 3x30\" manually with massage stick to adductor  DKTC: X  Ball Rolls: 1x10 with 3\" hold in flexion Therex: x10'  DKTC with sustained holds of 1 min- abolish back, D/B back, 1 min hold D/B back,  1 1/2 min hold D/B back   Repeated DKTC x 15  Piriformis stretch 2 x 30 seconds supine  Ball Rolls: 1x10 with 3\" hold in flexion Therex: x10'  DKTC with sustained holds of 1 min- abolish back, D/B back, 1 min hold D/B back,  1 1/2 min hold D/B back   Repeated DKTC x 15  Piriformis stretch 2 x 30 seconds supine  Ball Rolls: 1x10 with 3\" hold in flexion Therex: x10'  DKTC with sustained holds of 1 min- abolish back, D/B back, 1 min hold D/B back,  1 1/2 min hold D/B back   Repeated DKTC x 15  Piriformis stretch 2 x 30 seconds supine  Ball Rolls: 1x10 with 3\" hold in flexion    Theract:  Flossing L leg x 10 supine  Core - abd bracing: x10  Abd bracing with march x 15  Abd bracing with abd with pelvic ring x 15  Abd bracing with heel slide: x10   Theract:  Flossing L leg x 10 supine  Flossing BLEs: x10 seated  Core - abd bracing: X  Abd bracing with march x 10  Abd bracing with abd with pelvic ring x 10  Abd bracing with heel slide: X  Dead Bug with abd bracing: x 10 isometric  Pelvic Tilts: x15 seated Theract: x40'  Flossing L leg x 10 supine  Flossing BLEs: x10 seated  Core - abd bracing: X  Abd bracing with march x 10  Abd bracing with abd with pelvic ring x 15  Abd bracing with heel slide: x10 R/L  Dead Bug with abd bracing: x 10 isometric  Pelvic Tilts: x15 seated Theract: x40'  Flossing L leg x 10 supine  Flossing BLEs: x10 seated  Core - abd bracing: X  Abd bracing  with march x 10  Abd bracing with abd with pelvic ring x 15  Abd bracing with heel slide: x10 R/L  Dead Bug with abd bracing: x 10 isometric  Pelvic Tilts: x15 seated Theract: x40'  Flossing L leg x 10 supine  Flossing BLEs: x10 seated  Core - abd bracing: 1x15 3\" hold  Abd bracing with march x 10  Abd bracing with abd with pelvic ring x 15  Abd bracing with heel slide: x10 R/L  Dead Bug with abd bracing: x 10 isometric  Pelvic Tilts: x15 seated                   HEP:  Access Code: [email or web address]  URL: [email or web address]  Date: 06/24/2024  Prepared by: Norma Wall    Exercises  - Supine Double Knee to Chest  - 1 x daily - 7 x weekly - 3 sets - 30 second hold  - Supine Double Knee to Chest  - 1 x daily - 7 x weekly - 3 sets - 10 reps  - Supine Piriformis Stretch  - 1 x daily - 7 x weekly - 3 sets - 30 second hold  - Supine Sciatic Nerve Glide  - 1 x daily - 7 x weekly - 3 sets - 10 reps  - Seated Sciatic Tensioner  - 1 x daily - 7 x weekly - 3 sets - 10 reps  - Supine Posterior Pelvic Tilt  - 1 x daily - 7 x weekly - 3 sets - 10 reps  - Supine March with Posterior Pelvic Tilt  - 1 x daily - 7 x weekly - 3 sets - 10 reps  - Posterior Pelvic Tilt with Adduction Using Ball in Supine  - 1 x daily - 7 x weekly - 3 sets - 10 reps  - Supine Heel Slide  - 1 x daily - 7 x weekly - 3 sets - 10 reps  - Hooklying Isometric Clamshell  - 1 x daily - 7 x weekly - 3 sets - 10 reps  - Dead Bug with Swiss Ball  - 1 x daily - 7 x weekly - 3 sets - 10 reps  - Seated 3 Way Exercise Ball Roll Out Stretch  - 1 x daily - 7 x weekly - 3 sets - 10 reps  - Seated Pelvic Tilt  - 1 x daily - 7 x weekly - 3 sets - 10 reps  Charges: Therex: 10 minutes  Theract: 30 minutes  NMR: 0 minutes  Manual Therapy: 0 minutes  Total Timed Treatment: 40 min  Total Treatment Time: 40 min  * - 3 UNITS    Plan: Pt has met all goals and is discharged from skilled PT.     Patient/Family/Caregiver was advised of these findings, precautions, and  treatment options and has agreed to actively participate in planning and for this course of care.    Thank you for your referral. If you have any questions, please contact me at Dept: [phone].    Sincerely,  Electronically signed by therapist: Norma Wall PT     Physician's certification required:  No  Please co-sign or sign and return this letter via fax as soon as possible to [phone].   I certify the need for these services furnished under this plan of treatment and while under my care.    X___________________________________________________ Date____________________    Certification From: 6/24/2024  To:9/22/2024

## 2024-06-25 DIAGNOSIS — F41.1 GAD (GENERALIZED ANXIETY DISORDER): ICD-10-CM

## 2024-06-25 DIAGNOSIS — E78.5 HYPERLIPIDEMIA, UNSPECIFIED HYPERLIPIDEMIA TYPE: ICD-10-CM

## 2024-06-25 NOTE — PROGRESS NOTES
The pathology report from last visit showed   right nasal dorsum, shave biopsy:  -Mixed dermal infiltrate with histiocytes  Most consistent with ruptured folliculitis, no cancer.  Please log in test results.  Please call patient and inform of results and recommendations.  (please add to history).  Pt to  rtc  6 mos   or prn.

## 2024-06-27 RX ORDER — ALPRAZOLAM 0.25 MG
0.25 TABLET ORAL 2 TIMES DAILY
Qty: 60 TABLET | Refills: 1 | OUTPATIENT
Start: 2024-06-27

## 2024-06-28 RX ORDER — ATORVASTATIN CALCIUM 20 MG/1
20 TABLET, FILM COATED ORAL EVERY EVENING
Qty: 90 TABLET | Refills: 3 | Status: SHIPPED | OUTPATIENT
Start: 2024-06-28

## 2024-06-28 NOTE — TELEPHONE ENCOUNTER
Refill Per Protocol     Requested Prescriptions   Pending Prescriptions Disp Refills    atorvastatin 20 MG Oral Tab 90 tablet 1     Sig: Take 1 tablet (20 mg total) by mouth every evening.       Cholesterol Medication Protocol Passed - 6/25/2024 12:33 PM        Passed - ALT < 80     Lab Results   Component Value Date    ALT 19 01/27/2024             Passed - ALT resulted within past year        Passed - Lipid panel within past 12 months     Lab Results   Component Value Date    CHOLEST 188 01/27/2024    TRIG 124 01/27/2024    HDL 68 (H) 01/27/2024    LDL 98 01/27/2024    VLDL 20 01/27/2024    NONHDLC 120 01/27/2024             Passed - In person appointment or virtual visit in the past 12 mos or appointment in next 3 mos     Recent Outpatient Visits              4 days ago Acute left-sided low back pain with left-sided sciatica    [facility] in [place] Karpuzi, Mera, PT    Office Visit    1 week ago Neoplasm of uncertain behavior of skin    [facility] Nataly Baker MD    Office Visit    1 month ago Acute left-sided low back pain with left-sided sciatica    [facility] in [place] Karpuzi, Norma, PT    Office Visit    1 month ago Acute left-sided low back pain with left-sided sciatica    [facility] [place] Karpuzi, Norma, PT    Office Visit    1 month ago Acute left-sided low back pain with left-sided sciatica    [facility] [place] Karpuzi, Norma, PT    Office Visit          Future Appointments         Provider Department Appt Notes    [facility]    In 5 months Nataly Baker MD [facility] F/U                           Future Appointments         Provider Department Appt Notes    [facility]    In 5 months Nataly [facility] MD [facility] F[facility]          Recent Outpatient Visits              4 days ago Acute left-sided low back pain with left-sided sciatica    [facility] in [place] Karpuzi, Norma, PT    Office Visit    1 week ago Neoplasm of uncertain behavior of skin    [facility] Nataly Baker MD    Office Visit    1 month ago Acute left-sided low back pain with left-sided sciatica    [facility] [place] Karpuzi, Norma, PT    Office Visit    1 month ago Acute left-sided low back pain with left-sided sciatica    [facility] [place] Karpuzi, Norma, PT    Office Visit    1 month ago Acute left-sided low back pain with left-sided sciatica    [facility] in [place] Karpuzi, Norma, PT    Office Visit

## 2024-06-30 NOTE — PROGRESS NOTES
Operative Report                     Shave/  Tangential biopsy     Clinical diagnosis:    Size of lesion:    Location:Patient with lesion recurrent changing post cryo pearly papule 3mm at right nasal dorsum r/o BCC,     Procedure:    With patient in appropriate position the skin of the above was scrubbed with alcohol.  Anesthesia was obtained with 1% Xylocaine with epinephrine.  The skin surrounding the lesion was placed under tension and the lesion was incised using a #15 scalpel blade.  The specimen was sent for histopathologic exam.    Hemostasis was obtained with electrocautery/aluminum chloride.  Estimated blood loss less than 2 cc.    Biopsy dressed with Polysporin, bandage.    Pressure dressing:   No    Complications: None    Written instructions given and reviewed with patient    Await pathology    Contact information reviewed.    Procedural physician:  Nataly Baker MD

## 2024-06-30 NOTE — PROGRESS NOTES
Ashli Lange is a 68 year old female.  HPI:     CC:    Chief Complaint   Patient presents with    Actinic Keratosis     LOV 2023. Pt present to f/u on AK on nose. Previously bx. States it has come back.          Allergies:  Patient has no known allergies.    HISTORY:    Past Medical History:    Actinic keratosis    right nasal dorsum    Anxiety    Chicken pox    Folliculitis    right nasal dorsum- dermal infiltrate with histiocytes consistent with ruptured folliculitis    Heart murmur    Measles    Osteoarthritis    Other and unspecified hyperlipidemia    Primary osteoarthritis of left shoulder    Unspecified essential hypertension    Visual impairment    glasses      Past Surgical History:   Procedure Laterality Date    Colonoscopy      Colonoscopy      Egd      Electrocardiogram, complete  2005    Scanned to media tab     Hysterectomy      Korey localization wire 1 site right (cpt=19281) Right 2010    Other      left shoulder replacement     Other surgical history      breast biopsy     Thyroidectomy      Upper gi endoscopy performed  2015      Family History   Problem Relation Age of Onset    Diabetes Sister     Hypertension Father     Heart Disorder Mother     Cancer Mother     Lung Disorder Mother     Other (Other) Maternal Grandmother     Other (Other) Maternal Grandfather     Other (Other) Paternal Grandmother     Other (Other) Paternal Grandfather     Hypertension Sister 72    Breast Cancer Neg     Ovarian Cancer Neg       Social History     Socioeconomic History    Marital status:    Tobacco Use    Smoking status: Former     Current packs/day: 0.00     Types: Cigarettes     Quit date: 1/10/1990     Years since quittin.4    Smokeless tobacco: Never   Vaping Use    Vaping status: Never Used   Substance and Sexual Activity    Alcohol use: No     Comment: occ.    Drug use: No   Other Topics Concern    Caffeine Concern Yes     Comment: Occasionally; coffee     Reaction to local  anesthetic No    Pt has a pacemaker No    Pt has a defibrillator No        Current Outpatient Medications   Medication Sig Dispense Refill    ALPRAZolam 0.25 MG Oral Tab Take 1 tablet (0.25 mg total) by mouth 2 (two) times daily. 60 tablet 1    Fosinopril Sodium-HCTZ 20-12.5 MG Oral Tab Take 1 tablet by mouth daily. 90 tablet 3    methylPREDNISolone (MEDROL) 4 MG Oral Tablet Therapy Pack As directed. 1 each 0    amLODIPine 5 MG Oral Tab Take 1 tablet (5 mg total) by mouth daily. 90 tablet 3    atorvastatin 20 MG Oral Tab Take 1 tablet (20 mg total) by mouth every evening. 90 tablet 3     Allergies:   No Known Allergies    Past Medical History:    Actinic keratosis    right nasal dorsum    Anxiety    Chicken pox    Folliculitis    right nasal dorsum- dermal infiltrate with histiocytes consistent with ruptured folliculitis    Heart murmur    Measles    Osteoarthritis    Other and unspecified hyperlipidemia    Primary osteoarthritis of left shoulder    Unspecified essential hypertension    Visual impairment    glasses     Past Surgical History:   Procedure Laterality Date    Colonoscopy      Colonoscopy      Egd      Electrocardiogram, complete  2005    Scanned to media tab     Hysterectomy      Korey localization wire 1 site right (cpt=19281) Right     Other      left shoulder replacement     Other surgical history      breast biopsy     Thyroidectomy      Upper gi endoscopy performed  2015     Social History     Socioeconomic History    Marital status:      Spouse name: Not on file    Number of children: Not on file    Years of education: Not on file    Highest education level: Not on file   Occupational History    Not on file   Tobacco Use    Smoking status: Former     Current packs/day: 0.00     Types: Cigarettes     Quit date: 1/10/1990     Years since quittin.4    Smokeless tobacco: Never   Vaping Use    Vaping status: Never Used   Substance and Sexual Activity    Alcohol use: No      Comment: occ.    Drug use: No    Sexual activity: Not on file   Other Topics Concern     Service Not Asked    Blood Transfusions Not Asked    Caffeine Concern Yes     Comment: Occasionally; coffee     Occupational Exposure Not Asked    Hobby Hazards Not Asked    Sleep Concern Not Asked    Stress Concern Not Asked    Weight Concern Not Asked    Special Diet Not Asked    Back Care Not Asked    Exercise Not Asked    Bike Helmet Not Asked    Seat Belt Not Asked    Self-Exams Not Asked    Grew up on a farm Not Asked    History of tanning Not Asked    Outdoor occupation Not Asked    Breast feeding Not Asked    Reaction to local anesthetic No    Pt has a pacemaker No    Pt has a defibrillator No   Social History Narrative    Not on file     Social Determinants of Health     Financial Resource Strain: Not on file   Food Insecurity: Not on file   Transportation Needs: Not on file   Physical Activity: Not on file   Stress: Not on file   Social Connections: Not on file   Housing Stability: Not on file     Family History   Problem Relation Age of Onset    Diabetes Sister     Hypertension Father     Heart Disorder Mother     Cancer Mother     Lung Disorder Mother     Other (Other) Maternal Grandmother     Other (Other) Maternal Grandfather     Other (Other) Paternal Grandmother     Other (Other) Paternal Grandfather     Hypertension Sister 72    Breast Cancer Neg     Ovarian Cancer Neg        There were no vitals filed for this visit.    HPI:  Chief Complaint   Patient presents with    Actinic Keratosis     LOV 12/2023. Pt present to f/u on AK on nose. Previously bx. States it has come back.          Follow-up AK biopsied right nasal dorsum patient concerned with lesion crusted bleeding  Patient presents with concerns above.    Patient has been in their usual state of health.     Past notes/ records and appropriate/relevant lab results including pathology and past body maps reviewed. Including outside notes/ PCP notes as  appropriate. Updated and new information noted in current visit.     ROS:  Denies other relevant systemic complaints.      History, medications, allergies reviewed as noted.       Physical Examination:     Well-developed well-nourished patient alert oriented in no acute distress.  Exam performed, including scalp, head, neck, face,nails, hair, external eyes, including conjunctival mucosa, eyelids, lips external ears , arms, digits,palms.     Multiple light to medium brown, well marginated, uniformly pigmented, macules and papules 6 mm and less scattered on exam. pigmented lesions examined with dermoscopy benign-appearing patterns.     Waxy tannish keratotic papules scattered, cherry-red vascular papules scattered.    See map today's date for lesions noted .  See assessment and plan below for specific lesions.    Otherwise remarkable for lesions as noted on map.    See A/P  below for additional information:    Assessment / plan:    Orders Placed This Encounter   Procedures    Specimen to Pathology, Tissue [[facility] Pt to [facility] lab]       Meds & Refills for this Visit:  Requested Prescriptions      No prescriptions requested or ordered in this encounter         Encounter Diagnoses   Name Primary?    Neoplasm of uncertain behavior of skin Yes    Multiple nevi     Benign neoplasm of skin, unspecified location     Lentigo     SK (seborrheic keratosis)     Syringoma              Patient with lesion recurrent changing post cryo pearly papule 3mm at right nasal dorsum r/o BCC,   Shave/ tangential biopsy performed, operative note and consent in chart further plans pending pathology    Biopsy 12/23 right nasal dorsum AK with erosion, right lateral breast inflamed SK hyperpigmented  Area appears stable monitor carefully  Actinic Keratoses.  Precancerous nature discussed. Sun protection, sunscreen/ blocks encouraged .  Monitoring for new lesions.  Sun damage additional recurrent and new actinic keratoses, skin cancers may occur in  areas of prior actinic keratoses, related to past sun exposure to minimize current sun exposure.  Sunscreen applied consistently regularly, reapplication and sun protection while driving recommended.    Background of syringoma's over the eyelids noted previously asymptomatic  On examination multiple whitish dome-shaped smooth papules are noted.  Milia diagnosis , pathophysiology discussed.  Over-the-counter retinol products may cause peeling irritation.  Consider prescription Retin-A if worsening.    Other skin tags over the chest reassurance.  Please refer to map for specific lesions.  See additional diagnoses.  Pros cons of various therapies, risks benefits discussed.Pathophysiology discussed with patient.  Therapeutic options reviewed.  See  Medications in grid.  Instructions reviewed at length.    Benign nevi, seborrheic  keratoses, cherry angiomas:  Reassurance regarding other benign skin lesions.Signs and symptoms of skin cancer, ABCDE's of melanoma discussed with patient. Sunscreen use, sun protection, self exams reviewed.  Followup as noted RTC ---routine checkup    6 mos -one year or p.r.n.    Encounter Times   Including precharting, reviewing chart, prior notes obtaining history: 10 minutes, medical exam :10 minutes, notes on body map, plan, counseling 10minutes My total time spent caring for the patient on the day of the encounter: 30 minutes     The patient indicates understanding of these issues and agrees to the plan.  The patient is asked to return as noted in follow-up/ above.    This note was generated using Dragon voice recognition software.  Please contact me regarding any confusion resulting from errors in recognition..  Note to patient and family: The 21st Century Cures Act makes medical notes like these available to patients. However, be advised this is a medical document. It is intended as [id number] communication and monitoring of a patient's care needs. It is written in medical language  and may contain abbreviations or verbiage that are unfamiliar. It may appear blunt or direct. Medical documents are intended to carry relevant information, facts as evident and the clinical opinion of the practitioner.

## 2024-08-12 ENCOUNTER — PATIENT MESSAGE (OUTPATIENT)
Dept: INTERNAL MEDICINE CLINIC | Facility: CLINIC | Age: 69
End: 2024-08-12

## 2024-08-12 DIAGNOSIS — F41.1 GAD (GENERALIZED ANXIETY DISORDER): ICD-10-CM

## 2024-08-14 RX ORDER — ALPRAZOLAM 0.25 MG/1
0.25 TABLET ORAL 2 TIMES DAILY
Qty: 60 TABLET | Refills: 0 | Status: SHIPPED | OUTPATIENT
Start: 2024-08-14

## 2024-08-14 NOTE — TELEPHONE ENCOUNTER
Please Review. Protocol Failed; No Protocol   Quantity: 60  06/26/2024 05/20/2024 04/14/2024  Last written 04/17/2024  Recent Visits  Date Type Provider Dept   03/27/24 Office Visit Flora Schneider APRN [facility]   09/21/23 Office Visit Flora Schneider APRN [facility]   08/17/23 Office Visit Flora Schneider APRN [facility]   Showing recent visits within past 540 days with a meds authorizing provider and meeting all other requirements  Future Appointments  Date Type Provider Dept   08/26/24 Appointment Flora Schneider APRN [facility]   Showing future appointments within next 150 days with a meds authorizing provider and meeting all other requirements    Requested Prescriptions   Pending Prescriptions Disp Refills    ALPRAZolam 0.25 MG Oral Tab 60 tablet 1     Sig: Take 1 tablet (0.25 mg total) by mouth 2 (two) times daily.       Controlled Substance Medication Failed - 8/12/2024  9:43 AM        Failed - This medication is a controlled substance - forward to provider to refill               Future Appointments         Provider Department Appt Notes    In 1 week Flora Schneider APRN [facility] last MA 09/21/2023    In 3 months Nataly Baker MD [facility] F/U          Recent Outpatient Visits              1 month ago Acute left-sided low back pain with left-sided sciatica    [facility] in [place] Karpuzi, Norma, PT    Office Visit    1 month ago Neoplasm of uncertain behavior of skin    [facility] Nataly Baker MD    Office Visit    2 months ago Acute left-sided low back pain with left-sided sciatica    [facility] in [place] KarpuziNorma, PT    Office Visit    2 months ago Acute left-sided low back pain with left-sided sciatica    [facility] in [place] KarpuziNorma, PT    Office Visit    3 months ago Acute  left-sided low back pain with left-sided sciatica    [facility] in [place] Karpuzi, Mera, PT    Office Visit

## 2024-08-16 ENCOUNTER — TELEMEDICINE (OUTPATIENT)
Dept: INTERNAL MEDICINE CLINIC | Facility: CLINIC | Age: 69
End: 2024-08-16

## 2024-08-16 DIAGNOSIS — Z29.89 ALTITUDE SICKNESS PREVENTATIVE MEASURES: Primary | ICD-10-CM

## 2024-08-16 PROCEDURE — 1160F RVW MEDS BY RX/DR IN RCRD: CPT | Performed by: INTERNAL MEDICINE

## 2024-08-16 PROCEDURE — 1159F MED LIST DOCD IN RCRD: CPT | Performed by: INTERNAL MEDICINE

## 2024-08-16 PROCEDURE — 99213 OFFICE O/P EST LOW 20 MIN: CPT | Performed by: INTERNAL MEDICINE

## 2024-08-16 NOTE — TELEPHONE ENCOUNTER
I called the patient - visit time available for date and time below and she is agreeable.    Future Appointments   Date Time Provider Department Center   8/16/2024  3:00 PM Boogie Cano MD [facility]   8/26/2024  8:30 AM Flora Schneider APRN [facility]   12/2/2024  4:45 PM Nataly Baker MD [facility]

## 2024-08-16 NOTE — PROGRESS NOTES
Ashli Lange is a 68 year old female here today for a telemedicine/video visit.   HPI:   Please note that the following visit was completed using two-way, real-time interactive audio and video communication.  This has been done in good irma to provide continuity of care in the best interest of the provider-patient relationship, due to the ongoing public health crisis/national emergency and because of restrictions of visitation.  There are limitations of this visit as no physical exam could be performed.  Every conscious effort was taken to allow for sufficient and adequate time.  This billing was spent on reviewing labs, medications, radiology tests and decision making.  Appropriate medical decision-making and tests are ordered as detailed in the plan of care below.  Video Visit.     Ashli Lange verbally consents to a visit conducted using Telemedicine with live, interactive two way video and audio service on 8/16/2024.     Patient understands and accepts financial responsibility for any deductible, co-insurance and/or co-pays associated with this service. The patient was in the state of [place] during this encounter.     Duration of the service: 13 minutes, including clinical documentation     Summary of topics discussed:  please see note below.     Call placed in lieu of follow up visit due to COVID-19 pandemic.  [facility]  She is leaving tomorrow for a trip to [place].  Someone mentioned to her about medication to take to prevent high-altitude sickness.  She does not have a history of high-altitude sickness in the past.  She is not planning on reaching altitudes higher than in [place].  Discussed with her that she is at low risk for high-altitude sickness if maximum altitude is less than 9000 feet.  Discussed then that prophylactic medication such as acetazolamide is not indicated.  She is agreeable.  Current Outpatient Medications   Medication Sig Dispense Refill    ALPRAZolam  0.25 MG Oral Tab Take 1 tablet (0.25 mg total) by mouth 2 (two) times daily. 60 tablet 0    atorvastatin 20 MG Oral Tab Take 1 tablet (20 mg total) by mouth every evening. 90 tablet 3    Fosinopril Sodium-HCTZ 20-12.5 MG Oral Tab Take 1 tablet by mouth daily. 90 tablet 3    methylPREDNISolone (MEDROL) 4 MG Oral Tablet Therapy Pack As directed. 1 each 0    amLODIPine 5 MG Oral Tab Take 1 tablet (5 mg total) by mouth daily. 90 tablet 3     No Known Allergies   Past Medical History:    Actinic keratosis    right nasal dorsum    Anxiety    Chicken pox    Folliculitis    right nasal dorsum- dermal infiltrate with histiocytes consistent with ruptured folliculitis    Heart murmur    Measles    Osteoarthritis    Other and unspecified hyperlipidemia    Primary osteoarthritis of left shoulder    Unspecified essential hypertension    Visual impairment    glasses     Past Surgical History:   Procedure Laterality Date    Colonoscopy      Colonoscopy      Egd      Electrocardiogram, complete  2005    Scanned to media tab     Hysterectomy      Korey localization wire 1 site right (cpt=19281) Right     Other      left shoulder replacement     Other surgical history      breast biopsy     Thyroidectomy      Upper gi endoscopy performed  2015      Social History:  Social History     Socioeconomic History    Marital status:    Tobacco Use    Smoking status: Former     Current packs/day: 0.00     Types: Cigarettes     Quit date: 1/10/1990     Years since quittin.6    Smokeless tobacco: Never   Vaping Use    Vaping status: Never Used   Substance and Sexual Activity    Alcohol use: No     Comment: occ.    Drug use: No   Other Topics Concern    Caffeine Concern Yes     Comment: Occasionally; coffee     Reaction to local anesthetic No    Pt has a pacemaker No    Pt has a defibrillator No        EXAM:   GENERAL: Pleasant female conversing normally in no obvious distress  There were no vitals taken for this  visit.  HEENT: Voice normal  LUNGS: Breathing nonlabored  NEURO: Alert and appropriate. Affect normal. Mental status grossly intact      ASSESSMENT AND PLAN:   1. Altitude sickness preventative measures  As per above discussion, given low risk of potential high-altitude sickness, would not prophylax with acetazolamide      The patient indicates understanding of these issues and agrees to the plan.  The patient is asked to return as needed.    Boogie Cano MD  8/16/2024  3:12 PM

## 2024-09-05 ENCOUNTER — HOSPITAL ENCOUNTER (OUTPATIENT)
Dept: MAMMOGRAPHY | Facility: HOSPITAL | Age: 69
Discharge: HOME | End: 2024-09-05
Payer: MEDICARE

## 2024-09-05 DIAGNOSIS — Z12.31 VISIT FOR SCREENING MAMMOGRAM: ICD-10-CM

## 2024-09-05 PROCEDURE — 77067 SCR MAMMO BI INCL CAD: CPT

## 2024-09-05 PROCEDURE — 77063 BREAST TOMOSYNTHESIS BI: CPT

## 2024-09-10 ENCOUNTER — OFFICE VISIT (OUTPATIENT)
Dept: INTERNAL MEDICINE CLINIC | Facility: CLINIC | Age: 69
End: 2024-09-10

## 2024-09-10 VITALS
SYSTOLIC BLOOD PRESSURE: 135 MMHG | WEIGHT: 135 LBS | DIASTOLIC BLOOD PRESSURE: 75 MMHG | HEART RATE: 66 BPM | HEIGHT: 60 IN | BODY MASS INDEX: 26.5 KG/M2 | RESPIRATION RATE: 16 BRPM

## 2024-09-10 DIAGNOSIS — Z78.0 POSTMENOPAUSAL: ICD-10-CM

## 2024-09-10 DIAGNOSIS — R73.03 PREDIABETES: ICD-10-CM

## 2024-09-10 DIAGNOSIS — E55.9 VITAMIN D DEFICIENCY: ICD-10-CM

## 2024-09-10 DIAGNOSIS — E78.5 HYPERLIPIDEMIA, UNSPECIFIED HYPERLIPIDEMIA TYPE: Chronic | ICD-10-CM

## 2024-09-10 DIAGNOSIS — I10 ESSENTIAL HYPERTENSION: ICD-10-CM

## 2024-09-10 DIAGNOSIS — Z12.11 COLON CANCER SCREENING: ICD-10-CM

## 2024-09-10 DIAGNOSIS — M85.852 OSTEOPENIA OF NECK OF LEFT FEMUR: ICD-10-CM

## 2024-09-10 DIAGNOSIS — F41.9 ANXIETY DISORDER, UNSPECIFIED TYPE: ICD-10-CM

## 2024-09-10 DIAGNOSIS — Z12.31 VISIT FOR SCREENING MAMMOGRAM: ICD-10-CM

## 2024-09-10 DIAGNOSIS — Z00.00 MEDICARE ANNUAL WELLNESS VISIT, SUBSEQUENT: Primary | ICD-10-CM

## 2024-09-10 DIAGNOSIS — M79.644 THUMB PAIN, RIGHT: ICD-10-CM

## 2024-09-10 PROCEDURE — 1160F RVW MEDS BY RX/DR IN RCRD: CPT | Performed by: NURSE PRACTITIONER

## 2024-09-10 PROCEDURE — 96160 PT-FOCUSED HLTH RISK ASSMT: CPT | Performed by: NURSE PRACTITIONER

## 2024-09-10 PROCEDURE — 1125F AMNT PAIN NOTED PAIN PRSNT: CPT | Performed by: NURSE PRACTITIONER

## 2024-09-10 PROCEDURE — 3078F DIAST BP <80 MM HG: CPT | Performed by: NURSE PRACTITIONER

## 2024-09-10 PROCEDURE — G0439 PPPS, SUBSEQ VISIT: HCPCS | Performed by: NURSE PRACTITIONER

## 2024-09-10 PROCEDURE — 1170F FXNL STATUS ASSESSED: CPT | Performed by: NURSE PRACTITIONER

## 2024-09-10 PROCEDURE — 1159F MED LIST DOCD IN RCRD: CPT | Performed by: NURSE PRACTITIONER

## 2024-09-10 PROCEDURE — 3008F BODY MASS INDEX DOCD: CPT | Performed by: NURSE PRACTITIONER

## 2024-09-10 PROCEDURE — 3075F SYST BP GE 130 - 139MM HG: CPT | Performed by: NURSE PRACTITIONER

## 2024-09-10 NOTE — PROGRESS NOTES
Subjective:   Ashli Lange is a 69 year old female who presents for a Subsequent Annual Wellness visit (Pt already had Initial Annual Wellness) and scheduled follow up of multiple significant but stable problems.     She presents for medicare annual physical. She has a history of anxiety, HTN, hyperlipidemia and pre-diabetes.     Her BP is elevated in office. She checks her BP as needed at home.     She has a history of osteopenia. She is due for her DEXA scan in November 2024. She does take calcium daily in a MVI.     She is having right thumb pain for about 1.5 years. No trauma. She has tried Voltaren gel in the past with relief. The pain can wake her up at night.     She is up to date with vaccines. She will be going to get her influenza vaccine at [facility].     History/Other:   Fall Risk Assessment:   She has been screened for Falls and is High Risk. Fall Prevention information provided to patient in After Visit Summary.    Do you feel unsteady when standing or walking?: No  Do you worry about falling?: No  Have you fallen in the past year?: Yes  How many times have you fallen?: 1  Were you injured?: No     Cognitive Assessment:   She had a completely normal cognitive assessment - see flowsheet entries     Functional Ability/Status:   Ashli Lange has a completely normal functional assessment. See flowsheet for details.      Depression Screening (PHQ):  PHQ-2 SCORE: 0  , done 9/10/2024       Advanced Directives:   She does NOT have a Living Will. [Do you have a living will?: No]  She does NOT have a Power of  for Health Care. [Do you have a healthcare power of ?: No]  Discussed Advance Care Planning with patient (and family/surrogate if present). Standard forms made available to patient in After Visit Summary.      Patient Active Problem List   Diagnosis    Essential hypertension    Hyperlipidemia    Prediabetes    Anxiety disorder, unspecified     Allergies:  She has No Known  Allergies.    Current Medications:  Outpatient Medications Marked as Taking for the 9/10/24 encounter (Office Visit) with Anita Eid APRN   Medication Sig    ALPRAZolam 0.25 MG Oral Tab Take 1 tablet (0.25 mg total) by mouth 2 (two) times daily.    atorvastatin 20 MG Oral Tab Take 1 tablet (20 mg total) by mouth every evening.    Fosinopril Sodium-HCTZ 20-12.5 MG Oral Tab Take 1 tablet by mouth daily.    amLODIPine 5 MG Oral Tab Take 1 tablet (5 mg total) by mouth daily.       Medical History:  She  has a past medical history of Actinic keratosis (), Anxiety, Chicken pox, Folliculitis (2024), Heart murmur, Measles, Osteoarthritis, Other and unspecified hyperlipidemia, Primary osteoarthritis of left shoulder (2020), Unspecified essential hypertension, and Visual impairment.  Surgical History:  She  has a past surgical history that includes other surgical history; thyroidectomy; hysterectomy; electrocardiogram, complete (2005); colonoscopy (); upper gi endoscopy performed (2015); colonoscopy; egd; luis localization wire 1 site right (cpt=19281) (Right, ); and other ().   Family History:  Her family history includes Cancer in her mother; Diabetes in her sister; Heart Disorder in her mother; Hypertension in her father; Hypertension (age of onset: 72) in her sister; Lung Disorder in her mother; Other in her maternal grandfather, maternal grandmother, paternal grandfather, and paternal grandmother.  Social History:  She  reports that she quit smoking about 34 years ago. Her smoking use included cigarettes. She has never used smokeless tobacco. She reports that she does not drink alcohol and does not use drugs.    Tobacco:  She smoked tobacco in the past but quit greater than 12 months ago.  Social History     Tobacco Use   Smoking Status Former    Current packs/day: 0.00    Types: Cigarettes    Quit date: 1/10/1990    Years since quittin.6   Smokeless Tobacco Never         CAGE Alcohol Screen:   CAGE screening score of 0 on 9/10/2024, showing low risk of alcohol abuse.      Patient Care Team:  Faraz Gutierrez MD as PCP - General (Internal Medicine)  Orville Beltrán MD as Consulting Physician (GASTROENTEROLOGY)  Olamide Nixon, PT (Physical Therapy)    Review of Systems   Constitutional:  Negative for fever.   HENT: Negative.     Respiratory:  Negative for cough, shortness of breath and wheezing.    Cardiovascular:  Negative for chest pain.   Gastrointestinal: Negative.    Genitourinary: Negative.    Musculoskeletal:  Positive for arthralgias (right thumb pain).   Skin: Negative.    Neurological: Negative.    Psychiatric/Behavioral: Negative.         Objective:   Physical Exam  Vitals reviewed.   Constitutional:       Appearance: Normal appearance.   HENT:      Head: Normocephalic.      Right Ear: Tympanic membrane normal.      Left Ear: Tympanic membrane normal.      Nose: No congestion.      Mouth/Throat:      Pharynx: No posterior oropharyngeal erythema.   Eyes:      Extraocular Movements: Extraocular movements intact.      Pupils: Pupils are equal, round, and reactive to light.   Cardiovascular:      Rate and Rhythm: Normal rate and regular rhythm.      Pulses: Normal pulses.   Pulmonary:      Breath sounds: Normal breath sounds. No wheezing.   Abdominal:      General: Bowel sounds are normal.      Palpations: Abdomen is soft.      Tenderness: There is no abdominal tenderness.   Musculoskeletal:         General: No swelling. Normal range of motion.      Cervical back: Normal range of motion and neck supple.   Skin:     General: Skin is warm and dry.   Neurological:      Mental Status: She is alert and oriented to person, place, and time.   Psychiatric:         Mood and Affect: Mood normal.         Behavior: Behavior normal.           /75 (BP Location: Right arm)   Pulse 66   Resp 16   Ht 5' (1.524 m)   Wt 135 lb (61.2 kg)   BMI 26.37 kg/m²  Estimated body mass  index is 26.37 kg/m² as calculated from the following:    Height as of this encounter: 5' (1.524 m).    Weight as of this encounter: 135 lb (61.2 kg).    Medicare Hearing Assessment:   Hearing Screening    Screening Method: Questionnaire  I have a problem hearing over the telephone: No I have trouble following the conversations when two or more people are talking at the same time: No   I have trouble understanding things on the TV: No I have to strain to understand conversations: No   I have to worry about missing the telephone ring or doorbell: No I have trouble hearing conversations in a noisy background such as a crowded room or restaurant: No   I get confused about where sounds come from: No I misunderstand some words in a sentence and need to ask people to repeat themselves: No   I especially have trouble understanding the speech of women and children: No I have trouble understanding the speaker in a large room such as at a meeting or place of [place]: No   Many people I talk to seem to mumble (or don't speak clearly): No People get annoyed because I misunderstand what they say: No   I misunderstand what others are saying and make inappropriate responses: No I avoid social activities because I cannot hear well and fear I will reply improperly: No   Family members and friends have told me they think I may have hearing loss: No               Assessment & Plan:   Ashli Lange is a 69 year old female who presents for a Medicare Assessment.     1. Medicare annual wellness visit, subsequent (Primary)  2. Hyperlipidemia, unspecified hyperlipidemia type  - continue Lipitor as prescribed  - monitor diet and exercise as tolerated   -     Lipid Panel; Future; Expected date: 09/10/2024  -     TSH W Reflex To Free T4; Future; Expected date: 09/10/2024  3. Essential hypertension  - CPM  - rechecked BP manually in office 135/75  -     Comp Metabolic Panel (14); Future; Expected date: 09/10/2024  -     CBC With  Differential With Platelet; Future; Expected date: 09/10/2024  4. Prediabetes  - monitor diet and avoid sweets  -     Hemoglobin A1C; Future; Expected date: 09/10/2024  5. Anxiety disorder, unspecified type  - continue xanax as needed   6. Vitamin D deficiency  -     Vitamin D; Future; Expected date: 09/10/2024  7. Visit for screening mammogram  - Mammogram completed 9/2024, repeat in 12 months  8. Osteopenia of neck of left femur  - continue calcium supplement and exercise as tolerated   -     XR DEXA BONE DENSITOMETRY (CPT=77080); Future; Expected date: 11/18/2024  9. Postmenopausal  -     XR DEXA BONE DENSITOMETRY (CPT=77080); Future; Expected date: 11/18/2024  10. Thumb pain, right   - likely arthritis   - continue tylenol arthritis as needed   - if pain worsen will do imaging and refer to hand specialist   11. Colon cancer screening  - colonoscopy 4/2019  - repeat in 4/2029    The patient indicates understanding of these issues and agrees to the plan.  Imaging studies ordered.  Lab work ordered.  Reinforced healthy diet, lifestyle, and exercise.      Return in about 1 year (around 9/10/2025).     Anita Eid, TETO, 9/10/2024     Supplementary Documentation:   General Health:  In the past six months, have you lost more than 10 pounds without trying?: 2 - No  Has your appetite been poor?: No  Type of Diet: Balanced;Low Carb  How does the patient maintain a good energy level?: Daily Walks;Stretching  How would you describe your daily physical activity?: Moderate  How would you describe your current health state?: Good  How do you maintain positive mental well-being?: Social Interaction;Visiting Friends;Visiting Family  On a scale of 0 to 10, with 0 being no pain and 10 being severe pain, what is your pain level?: 4 - (Moderate)  In the past six months, have you experienced urine leakage?: 0-No  At any time do you feel concerned for the safety/well-being of yourself and/or your children, in your home or  elsewhere?: No  Have you had any immunizations at another office such as Influenza, Hepatitis B, Tetanus, or Pneumococcal?: No    Health Maintenance   Topic Date Due    MA Annual Health Assessment  01/01/2024    Annual Depression Screening  01/01/2024    Fall Risk Screening (Annual)  01/01/2024    COVID-19 Vaccine (5 - 2023-24 season) 09/01/2024    Influenza Vaccine (1) 10/01/2024    HTN: BP Follow-Up  10/10/2024    Mammogram  09/05/2025    Colorectal Cancer Screening  04/12/2029    DEXA Scan  Completed    Pneumococcal Vaccine: 65+ Years  Completed    Zoster Vaccines  Completed

## 2024-09-26 DIAGNOSIS — F41.1 GAD (GENERALIZED ANXIETY DISORDER): ICD-10-CM

## 2024-09-30 DIAGNOSIS — F41.1 GAD (GENERALIZED ANXIETY DISORDER): ICD-10-CM

## 2024-10-01 RX ORDER — ALPRAZOLAM 0.25 MG
0.25 TABLET ORAL 2 TIMES DAILY
Qty: 60 TABLET | Refills: 0 | Status: SHIPPED | OUTPATIENT
Start: 2024-10-01

## 2024-10-01 RX ORDER — ALPRAZOLAM 0.25 MG
0.25 TABLET ORAL 2 TIMES DAILY
Qty: 60 TABLET | Refills: 0 | OUTPATIENT
Start: 2024-10-01

## 2024-10-01 NOTE — TELEPHONE ENCOUNTER
Please review; protocol failed/No Protocol    Recent Fills: 05/20/2024, 06/26/2024, 08/14/2024    Last Rx Written: 08/14/2024    Last Office Visit: 09/10/2024    Requested Prescriptions   Pending Prescriptions Disp Refills    ALPRAZolam 0.25 MG Oral Tab 60 tablet 0     Sig: Take 1 tablet (0.25 mg total) by mouth 2 (two) times daily.       Controlled Substance Medication Failed - 9/26/2024  4:25 PM        Failed - This medication is a controlled substance - forward to provider to refill           Future Appointments         Provider Department Appt Notes    In 2 months Nataly Baker MD [facility] [place] F/U          Recent Outpatient Visits              3 weeks ago Medicare annual wellness visit, subsequent    [facility]    Office Visit    1 month ago Altitude sickness preventative measures    [facility] [place]    Telemedicine    3 months ago Acute left-sided low back pain with left-sided sciatica    [facility] [place] KarpuziNorma, PT    Office Visit    3 months ago Neoplasm of uncertain behavior of skin    [facility] [place]    Office Visit    4 months ago Acute left-sided low back pain with left-sided sciatica    [facility] [place] KarpuziNorma, PT    Office Visit

## 2024-10-04 RX ORDER — AMLODIPINE BESYLATE 5 MG/1
5 TABLET ORAL DAILY
Qty: 90 TABLET | Refills: 3 | Status: SHIPPED | OUTPATIENT
Start: 2024-10-04

## 2024-10-04 NOTE — TELEPHONE ENCOUNTER
Refill passed per [facility] protocols.    Requested Prescriptions   Pending Prescriptions Disp Refills    AMLODIPINE 5 MG Oral Tab [Pharmacy Med Name: amLODIPine Besylate Oral Tablet 5 MG] 90 tablet 3     Sig: TAKE 1 TABLET EVERY DAY       Hypertension Medications Protocol Passed - 10/2/2024  1:05 AM        Passed - CMP or BMP in past 12 months        Passed - Last BP reading less than 140/90     BP Readings from Last 1 Encounters:   09/10/24 135/75               Passed - In person appointment or virtual visit in the past 12 mos or appointment in next 3 mos     Recent Outpatient Visits              3 weeks ago [facility] annual wellness [facility], subsequent    [facility]    Office Visit    1 month ago Altitude sickness preventative measures    [facility]    Telemedicine    3 months ago Acute left-sided low back pain with left-sided sciatica    [facility] [place] KarpuziNorma, PT    Office Visit    3 months ago Neoplasm of uncertain behavior of skin    [facility]    Office Visit    4 months ago Acute left-sided low back pain with left-sided sciatica    [facility] [place] KarpuziNorma, PT    Office Visit          Future Appointments         Provider Department Appt Notes    In 1 month [facility] Baker MD [facility]                    Passed - EGFRCR or GFRNAA > 50     GFR Evaluation  EGFRCR: 67 , resulted on 1/27/2024

## 2024-11-02 DIAGNOSIS — F41.1 GAD (GENERALIZED ANXIETY DISORDER): ICD-10-CM

## 2024-11-05 RX ORDER — AMLODIPINE BESYLATE 5 MG/1
5 TABLET ORAL DAILY
Qty: 90 TABLET | Refills: 3 | Status: SHIPPED | OUTPATIENT
Start: 2024-11-05

## 2024-11-05 NOTE — TELEPHONE ENCOUNTER
REFILL PASSED PER [facility] PROTOCOLS    Requested Prescriptions   Pending Prescriptions Disp Refills    amLODIPine 5 MG Oral Tab 90 tablet 3     Sig: Take 1 tablet (5 mg total) by mouth daily.       Hypertension Medications Protocol Passed - 11/5/2024  2:46 PM        Passed - CMP or BMP in past 12 months        Passed - Last BP reading less than 140/90     BP Readings from Last 1 Encounters:   09/10/24 135/75               Passed - In person appointment or virtual visit in the past 12 mos or appointment in next 3 mos     Recent Outpatient Visits              1 month ago Medicare annual wellness visit, subsequent    [facility] [place]    Office Visit    2 months ago Altitude sickness preventative measures    [facility] [place] Boogie Cano MD    Telemedicine    4 months ago Acute left-sided low back pain with left-sided sciatica    [facility] [place] Karpuzi, Mera, PT    Office Visit    4 months ago Neoplasm of uncertain behavior of skin    [facility] [place]    Office Visit    5 months ago Acute left-sided low back pain with left-sided sciatica    [facility] [place] Karpuzi, Mera, PT    Office Visit          Future Appointments         Provider Department Appt Notes    In 3 weeks Nataly Baker MD [facility] [place] F/U    In 4 weeks [facility] RM1; [facility] DEXA [address] DEXA - [place] Hips and lower spine                    Passed - EGFRCR or GFRNAA > 50     GFR Evaluation  EGFRCR: 67 , resulted on 1/27/2024               Future Appointments         Provider Department Appt Notes    In 3 weeks [facility]    In 4 weeks [facility] RM1; [facility] DEXA RM1 [facility] DEXA - [place] Hips and lower spine           Recent Outpatient Visits              1 month ago Medicare annual wellness visit, subsequent    [facility]    Office Visit    2 months ago Altitude sickness preventative measures    [facility], Boogie Chung MD    Telemedicine    4 months ago Acute left-sided low back pain with left-sided sciatica    [facility] [place] soto, PT    Office Visit    4 months ago Neoplasm of uncertain behavior of skin    [facility], Nataly Zamudio MD    Office Visit    5 months ago Acute left-sided low back pain with left-sided sciatica    [facility] [place] Norma Wall, PT    Office Visit

## 2024-11-06 RX ORDER — ALPRAZOLAM 0.25 MG/1
0.25 TABLET ORAL 2 TIMES DAILY
Qty: 60 TABLET | Refills: 0 | Status: SHIPPED | OUTPATIENT
Start: 2024-11-06

## 2024-11-06 NOTE — TELEPHONE ENCOUNTER
Please review. Protocol Failed; No Protocol      Recent fills: 6/26/2024, 8/14/2024, 10/1/2024  Last Rx written: 10/1/2024  Last office visit: 9/10/2024        Requested Prescriptions   Pending Prescriptions Disp Refills    ALPRAZolam 0.25 MG Oral Tab 60 tablet 0     Sig: Take 1 tablet (0.25 mg total) by mouth 2 (two) times daily.       Controlled Substance Medication Failed - 11/6/2024  1:42 PM        Failed - This medication is a controlled substance - forward to provider to refill               Future Appointments         Provider Department Appt Notes    In 3 weeks Nataly Baker MD [facility] F/U    In 4 weeks [facility] RM1; [facility] DEXA RM1 [facility] DEXA - [place] Hips and lower spine          Recent Outpatient Visits              1 month ago Medicare annual wellness visit, subsequent    [facility]    Office Visit    2 months ago Altitude sickness preventative measures    [facility]    Telemedicine    4 months ago Acute left-sided low back pain with left-sided sciatica    [facility] [place] KarpNorma soto, PT    Office Visit    4 months ago Neoplasm of uncertain behavior of skin    [facility] Nataly Baker MD    Office Visit    5 months ago Acute left-sided low back pain with left-sided sciatica    [facility] [place] Norma Wall, PT    Office Visit

## 2024-11-19 ENCOUNTER — OFFICE VISIT (OUTPATIENT)
Dept: INTERNAL MEDICINE CLINIC | Facility: CLINIC | Age: 69
End: 2024-11-19
Payer: MEDICARE

## 2024-11-19 VITALS
DIASTOLIC BLOOD PRESSURE: 80 MMHG | BODY MASS INDEX: 26.7 KG/M2 | RESPIRATION RATE: 16 BRPM | SYSTOLIC BLOOD PRESSURE: 130 MMHG | HEART RATE: 73 BPM | WEIGHT: 136 LBS | HEIGHT: 60 IN

## 2024-11-19 DIAGNOSIS — M79.644 THUMB PAIN, RIGHT: Primary | ICD-10-CM

## 2024-11-19 DIAGNOSIS — I10 ESSENTIAL HYPERTENSION: ICD-10-CM

## 2024-11-19 PROCEDURE — 1159F MED LIST DOCD IN RCRD: CPT | Performed by: NURSE PRACTITIONER

## 2024-11-19 PROCEDURE — 3079F DIAST BP 80-89 MM HG: CPT | Performed by: NURSE PRACTITIONER

## 2024-11-19 PROCEDURE — 3008F BODY MASS INDEX DOCD: CPT | Performed by: NURSE PRACTITIONER

## 2024-11-19 PROCEDURE — 3075F SYST BP GE 130 - 139MM HG: CPT | Performed by: NURSE PRACTITIONER

## 2024-11-19 PROCEDURE — 1170F FXNL STATUS ASSESSED: CPT | Performed by: NURSE PRACTITIONER

## 2024-11-19 PROCEDURE — 99213 OFFICE O/P EST LOW 20 MIN: CPT | Performed by: NURSE PRACTITIONER

## 2024-11-19 PROCEDURE — 1160F RVW MEDS BY RX/DR IN RCRD: CPT | Performed by: NURSE PRACTITIONER

## 2024-11-19 NOTE — PROGRESS NOTES
Ashli Lange is a 69 year old female.  Chief Complaint   Patient presents with    Pain     Right thumb follow up     HPI:   She presents for follow up. She is having right thumb. The pain has been going on for several years. She has tried Voltaren and Biofreeze with little relief. She is experiencing stiffness in the right thumb. She is having right hand weakness. She does have history of carpal tunnel surgery on right hand.     She denies any numbness or tingling in the right hand. There is no redness or no swelling.     Current Outpatient Medications   Medication Sig Dispense Refill    ALPRAZolam 0.25 MG Oral Tab Take 1 tablet (0.25 mg total) by mouth 2 (two) times daily. 60 tablet 0    amLODIPine 5 MG Oral Tab Take 1 tablet (5 mg total) by mouth daily. 90 tablet 3    atorvastatin 20 MG Oral Tab Take 1 tablet (20 mg total) by mouth every evening. 90 tablet 3    Fosinopril Sodium-HCTZ 20-12.5 MG Oral Tab Take 1 tablet by mouth daily. 90 tablet 3      Past Medical History:    Actinic keratosis    right nasal dorsum    Anxiety    Chicken pox    Folliculitis    right nasal dorsum- dermal infiltrate with histiocytes consistent with ruptured folliculitis    Heart murmur    Measles    Osteoarthritis    Other and unspecified hyperlipidemia    Primary osteoarthritis of left shoulder    Unspecified essential hypertension    Visual impairment    glasses      Past Surgical History:   Procedure Laterality Date    Colonoscopy  2006    Colonoscopy      Egd      Electrocardiogram, complete  07/05/2005    Scanned to media tab     Hysterectomy      [place] localization wire 1 site right (cpt=19281) Right 2010    Other  2020    left shoulder replacement     Other surgical history      breast biopsy     Thyroidectomy      Upper gi endoscopy performed  5/2015      Social History:  Social History     Socioeconomic History    Marital status:    Tobacco Use    Smoking status: Former     Current packs/day: 0.00     Types:  Cigarettes     Quit date: 1/10/1990     Years since quittin.8    Smokeless tobacco: Never   Vaping Use    Vaping status: Never Used   Substance and Sexual Activity    Alcohol use: No     Comment: occ.    Drug use: No   Other Topics Concern    Caffeine Concern Yes     Comment: Occasionally; coffee     Reaction to local anesthetic No    Pt has a pacemaker No    Pt has a defibrillator No      Family History   Problem Relation Age of Onset    Diabetes Sister     Hypertension Father     Heart Disorder Mother     Cancer Mother     Lung Disorder Mother     Other (Other) Maternal Grandmother     Other (Other) Maternal Grandfather     Other (Other) Paternal Grandmother     Other (Other) Paternal Grandfather     Hypertension Sister 72    Breast Cancer Neg     Ovarian Cancer Neg       Allergies[1]     REVIEW OF SYSTEMS:     Review of Systems   Constitutional:  Negative for fever.   HENT: Negative.     Respiratory:  Negative for cough, shortness of breath and wheezing.    Cardiovascular:  Negative for chest pain.   Gastrointestinal: Negative.    Genitourinary: Negative.    Musculoskeletal:  Positive for arthralgias (right thumb).   Skin: Negative.    Neurological: Negative.    Psychiatric/Behavioral: Negative.        Wt Readings from Last 5 Encounters:   24 136 lb (61.7 kg)   09/10/24 135 lb (61.2 kg)   24 134 lb (60.8 kg)   23 139 lb (63 kg)   23 139 lb (63 kg)     Body mass index is 26.56 kg/m².      EXAM:   /80 (BP Location: Right arm)   Pulse 73   Resp 16   Ht 5' (1.524 m)   Wt 136 lb (61.7 kg)   BMI 26.56 kg/m²     Physical Exam  Vitals reviewed.   Constitutional:       Appearance: Normal appearance.   HENT:      Head: Normocephalic.   Cardiovascular:      Rate and Rhythm: Normal rate and regular rhythm.      Pulses: Normal pulses.   Pulmonary:      Breath sounds: Normal breath sounds. No wheezing.   Musculoskeletal:         General: No swelling.      Right hand: No swelling.  Decreased range of motion. Decreased strength.   Skin:     General: Skin is warm and dry.   Neurological:      Mental Status: She is alert and oriented to person, place, and time.   Psychiatric:         Mood and Affect: Mood normal.         Behavior: Behavior normal.            ASSESSMENT AND PLAN:   1. Thumb pain, right  - will obtain imaging  - the pain is chronic and has been going on for years  - ? arthritis  - XR HAND (MIN 3 VIEWS), RIGHT (CPT=73130); Future  - Ortho Referral - In Network    2. Essential hypertension  - CPM  - BP stable in office.       The patient indicates understanding of these issues and agrees to the plan.  Return for follow up with Dr Maguire as scheduled.       [1] No Known Allergies

## 2024-11-30 ENCOUNTER — HOSPITAL ENCOUNTER (OUTPATIENT)
Dept: GENERAL RADIOLOGY | Age: 69
Discharge: HOME | End: 2024-11-30
Attending: NURSE PRACTITIONER
Payer: MEDICARE

## 2024-11-30 DIAGNOSIS — M79.644 THUMB PAIN, RIGHT: ICD-10-CM

## 2024-11-30 PROCEDURE — 73130 X-RAY EXAM OF HAND: CPT | Performed by: NURSE PRACTITIONER

## 2024-12-02 ENCOUNTER — LAB ENCOUNTER (OUTPATIENT)
Dept: LAB | Age: 69
End: 2024-12-02
Attending: NURSE PRACTITIONER
Payer: MEDICARE

## 2024-12-02 ENCOUNTER — OFFICE VISIT (OUTPATIENT)
Dept: DERMATOLOGY CLINIC | Facility: CLINIC | Age: 69
End: 2024-12-02
Payer: MEDICARE

## 2024-12-02 DIAGNOSIS — D23.9 BENIGN NEOPLASM OF SKIN, UNSPECIFIED LOCATION: ICD-10-CM

## 2024-12-02 DIAGNOSIS — D22.9 MULTIPLE NEVI: ICD-10-CM

## 2024-12-02 DIAGNOSIS — E55.9 VITAMIN D DEFICIENCY: ICD-10-CM

## 2024-12-02 DIAGNOSIS — I10 ESSENTIAL HYPERTENSION: ICD-10-CM

## 2024-12-02 DIAGNOSIS — E78.5 HYPERLIPIDEMIA, UNSPECIFIED HYPERLIPIDEMIA TYPE: Chronic | ICD-10-CM

## 2024-12-02 DIAGNOSIS — L81.4 LENTIGO: ICD-10-CM

## 2024-12-02 DIAGNOSIS — R73.03 PREDIABETES: ICD-10-CM

## 2024-12-02 DIAGNOSIS — D23.9 SYRINGOMA: ICD-10-CM

## 2024-12-02 DIAGNOSIS — L82.1 SK (SEBORRHEIC KERATOSIS): ICD-10-CM

## 2024-12-02 DIAGNOSIS — L57.0 AK (ACTINIC KERATOSIS): Primary | ICD-10-CM

## 2024-12-02 LAB
ALBUMIN SERPL-MCNC: 4.7 G/DL (ref 3.2–4.8)
ALBUMIN/GLOB SERPL: 1.9 {RATIO} (ref 1–2)
ALP LIVER SERPL-CCNC: 41 U/L
ALT SERPL-CCNC: 20 U/L
ANION GAP SERPL CALC-SCNC: 7 MMOL/L (ref 0–18)
AST SERPL-CCNC: 14 U/L (ref ?–34)
BASOPHILS # BLD AUTO: 0.06 X10(3) UL (ref 0–0.2)
BASOPHILS NFR BLD AUTO: 0.9 %
BILIRUB SERPL-MCNC: 0.5 MG/DL (ref 0.2–1.1)
BUN BLD-MCNC: 17 MG/DL (ref 9–23)
BUN/CREAT SERPL: 23.9 (ref 10–20)
CALCIUM BLD-MCNC: 10.2 MG/DL (ref 8.7–10.4)
CHLORIDE SERPL-SCNC: 109 MMOL/L (ref 98–112)
CHOLEST SERPL-MCNC: 185 MG/DL (ref ?–200)
CO2 SERPL-SCNC: 28 MMOL/L (ref 21–32)
CREAT BLD-MCNC: 0.71 MG/DL
DEPRECATED RDW RBC AUTO: 41.8 FL (ref 35.1–46.3)
EGFRCR SERPLBLD CKD-EPI 2021: 93 ML/MIN/1.73M2 (ref 60–?)
EOSINOPHIL # BLD AUTO: 0.21 X10(3) UL (ref 0–0.7)
EOSINOPHIL NFR BLD AUTO: 3.3 %
ERYTHROCYTE [DISTWIDTH] IN BLOOD BY AUTOMATED COUNT: 12.5 % (ref 11–15)
EST. AVERAGE GLUCOSE BLD GHB EST-MCNC: 114 MG/DL (ref 68–126)
FASTING PATIENT LIPID ANSWER: YES
FASTING STATUS PATIENT QL REPORTED: YES
GLOBULIN PLAS-MCNC: 2.5 G/DL (ref 2–3.5)
GLUCOSE BLD-MCNC: 117 MG/DL (ref 70–99)
HBA1C MFR BLD: 5.6 % (ref ?–5.7)
HCT VFR BLD AUTO: 37.6 %
HDLC SERPL-MCNC: 72 MG/DL (ref 40–59)
HGB BLD-MCNC: 13 G/DL
IMM GRANULOCYTES # BLD AUTO: 0.02 X10(3) UL (ref 0–1)
IMM GRANULOCYTES NFR BLD: 0.3 %
LDLC SERPL CALC-MCNC: 96 MG/DL (ref ?–100)
LYMPHOCYTES # BLD AUTO: 2.37 X10(3) UL (ref 1–4)
LYMPHOCYTES NFR BLD AUTO: 37.4 %
MCH RBC QN AUTO: 31.8 PG (ref 26–34)
MCHC RBC AUTO-ENTMCNC: 34.6 G/DL (ref 31–37)
MCV RBC AUTO: 91.9 FL
MONOCYTES # BLD AUTO: 0.44 X10(3) UL (ref 0.1–1)
MONOCYTES NFR BLD AUTO: 7 %
NEUTROPHILS # BLD AUTO: 3.23 X10 (3) UL (ref 1.5–7.7)
NEUTROPHILS # BLD AUTO: 3.23 X10(3) UL (ref 1.5–7.7)
NEUTROPHILS NFR BLD AUTO: 51.1 %
NONHDLC SERPL-MCNC: 113 MG/DL (ref ?–130)
OSMOLALITY SERPL CALC.SUM OF ELEC: 301 MOSM/KG (ref 275–295)
PLATELET # BLD AUTO: 305 10(3)UL (ref 150–450)
POTASSIUM SERPL-SCNC: 3.9 MMOL/L (ref 3.5–5.1)
PROT SERPL-MCNC: 7.2 G/DL (ref 5.7–8.2)
RBC # BLD AUTO: 4.09 X10(6)UL
SODIUM SERPL-SCNC: 144 MMOL/L (ref 136–145)
TRIGL SERPL-MCNC: 93 MG/DL (ref 30–149)
TSI SER-ACNC: 1.72 UIU/ML (ref 0.55–4.78)
VIT D+METAB SERPL-MCNC: 52.2 NG/ML (ref 30–100)
VLDLC SERPL CALC-MCNC: 15 MG/DL (ref 0–30)
WBC # BLD AUTO: 6.3 X10(3) UL (ref 4–11)

## 2024-12-02 PROCEDURE — G2211 COMPLEX E/M VISIT ADD ON: HCPCS | Performed by: DERMATOLOGY

## 2024-12-02 PROCEDURE — 1159F MED LIST DOCD IN RCRD: CPT | Performed by: DERMATOLOGY

## 2024-12-02 PROCEDURE — 1160F RVW MEDS BY RX/DR IN RCRD: CPT | Performed by: DERMATOLOGY

## 2024-12-02 PROCEDURE — 80053 COMPREHEN METABOLIC PANEL: CPT

## 2024-12-02 PROCEDURE — 82306 VITAMIN D 25 HYDROXY: CPT

## 2024-12-02 PROCEDURE — 85025 COMPLETE CBC W/AUTO DIFF WBC: CPT

## 2024-12-02 PROCEDURE — 99213 OFFICE O/P EST LOW 20 MIN: CPT | Performed by: DERMATOLOGY

## 2024-12-02 PROCEDURE — 36415 COLL VENOUS BLD VENIPUNCTURE: CPT

## 2024-12-02 PROCEDURE — 84443 ASSAY THYROID STIM HORMONE: CPT

## 2024-12-02 PROCEDURE — 80061 LIPID PANEL: CPT

## 2024-12-02 PROCEDURE — 83036 HEMOGLOBIN GLYCOSYLATED A1C: CPT

## 2024-12-04 ENCOUNTER — HOSPITAL ENCOUNTER (OUTPATIENT)
Dept: BONE DENSITY | Age: 69
Discharge: HOME | End: 2024-12-04
Attending: NURSE PRACTITIONER
Payer: MEDICARE

## 2024-12-04 DIAGNOSIS — M85.852 OSTEOPENIA OF NECK OF LEFT FEMUR: ICD-10-CM

## 2024-12-04 DIAGNOSIS — Z78.0 POSTMENOPAUSAL: ICD-10-CM

## 2024-12-04 PROCEDURE — 77080 DXA BONE DENSITY AXIAL: CPT | Performed by: NURSE PRACTITIONER

## 2024-12-10 ENCOUNTER — PATIENT MESSAGE (OUTPATIENT)
Dept: INTERNAL MEDICINE CLINIC | Facility: CLINIC | Age: 69
End: 2024-12-10

## 2024-12-15 DIAGNOSIS — F41.1 GAD (GENERALIZED ANXIETY DISORDER): ICD-10-CM

## 2024-12-15 NOTE — PROGRESS NOTES
Ashli Lange is a 69 year old female.  HPI:     CC:    Chief Complaint   Patient presents with    Actinic Keratosis     LOV 24. Pt presents for f/u regarding site of Ak removal on  R nasal dorsum. Has c/o 2 raised lesion on either side of neck, near hair line for 6 months or longer. Denies itching or pain. Pt admits to scratching the lesions at times. Denies personal or family Hx of skin cancer.         Allergies:  Patient has no known allergies.    HISTORY:    Past Medical History:    Actinic keratosis    right nasal dorsum    Anxiety    Chicken pox    Folliculitis    right nasal dorsum- dermal infiltrate with histiocytes consistent with ruptured folliculitis    Heart murmur    Measles    Osteoarthritis    Other and unspecified hyperlipidemia    Primary osteoarthritis of left shoulder    Unspecified essential hypertension    Visual impairment    glasses      Past Surgical History:   Procedure Laterality Date    Colonoscopy      Colonoscopy      Egd      Electrocardiogram, complete  2005    Scanned to media tab     Hysterectomy      Korey localization wire 1 site right (cpt=19281) Right 2010    Other  2020    left shoulder replacement     Other surgical history      breast biopsy     Thyroidectomy      Upper gi endoscopy performed  2015      Family History   Problem Relation Age of Onset    Diabetes Sister     Hypertension Father     Heart Disorder Mother     Cancer Mother     Lung Disorder Mother     Other (Other) Maternal Grandmother     Other (Other) Maternal Grandfather     Other (Other) Paternal Grandmother     Other (Other) Paternal Grandfather     Hypertension Sister 72    Breast Cancer Neg     Ovarian Cancer Neg       Social History     Socioeconomic History    Marital status:    Tobacco Use    Smoking status: Former     Current packs/day: 0.00     Types: Cigarettes     Quit date: 1/10/1990     Years since quittin.9    Smokeless tobacco: Never   Vaping Use    Vaping status:  Never Used   Substance and Sexual Activity    Alcohol use: No     Comment: occ.    Drug use: No   Other Topics Concern    Caffeine Concern Yes     Comment: Occasionally; coffee     Reaction to local anesthetic No    Pt has a pacemaker No    Pt has a defibrillator No        Current Outpatient Medications   Medication Sig Dispense Refill    ALPRAZolam 0.25 MG Oral Tab Take 1 tablet (0.25 mg total) by mouth 2 (two) times daily. 60 tablet 0    amLODIPine 5 MG Oral Tab Take 1 tablet (5 mg total) by mouth daily. 90 tablet 3    atorvastatin 20 MG Oral Tab Take 1 tablet (20 mg total) by mouth every evening. 90 tablet 3    Fosinopril Sodium-HCTZ 20-12.5 MG Oral Tab Take 1 tablet by mouth daily. 90 tablet 3     Allergies:   No Known Allergies    Past Medical History:    Actinic keratosis    right nasal dorsum    Anxiety    Chicken pox    Folliculitis    right nasal dorsum- dermal infiltrate with histiocytes consistent with ruptured folliculitis    Heart murmur    Measles    Osteoarthritis    Other and unspecified hyperlipidemia    Primary osteoarthritis of left shoulder    Unspecified essential hypertension    Visual impairment    glasses     Past Surgical History:   Procedure Laterality Date    Colonoscopy      Colonoscopy      Egd      Electrocardiogram, complete  2005    Scanned to media tab     Hysterectomy      Korey localization wire 1 site right (cpt=19281) Right 2010    Other      left shoulder replacement     Other surgical history      breast biopsy     Thyroidectomy      Upper gi endoscopy performed  2015     Social History     Socioeconomic History    Marital status:      Spouse name: Not on file    Number of children: Not on file    Years of education: Not on file    Highest education level: Not on file   Occupational History    Not on file   Tobacco Use    Smoking status: Former     Current packs/day: 0.00     Types: Cigarettes     Quit date: 1/10/1990     Years since quittin.9     Smokeless tobacco: Never   Vaping Use    Vaping status: Never Used   Substance and Sexual Activity    Alcohol use: No     Comment: occ.    Drug use: No    Sexual activity: Not on file   Other Topics Concern     Service Not Asked    Blood Transfusions Not Asked    Caffeine Concern Yes     Comment: Occasionally; coffee     Occupational Exposure Not Asked    Hobby Hazards Not Asked    Sleep Concern Not Asked    Stress Concern Not Asked    Weight Concern Not Asked    Special Diet Not Asked    Back Care Not Asked    Exercise Not Asked    Bike Helmet Not Asked    Seat Belt Not Asked    Self-Exams Not Asked    Grew up on a farm Not Asked    History of tanning Not Asked    Outdoor occupation Not Asked    Breast feeding Not Asked    Reaction to local anesthetic No    Pt has a pacemaker No    Pt has a defibrillator No   Social History Narrative    Not on file     Social Drivers of Health     Financial Resource Strain: Not on file   Food Insecurity: Not on file   Transportation Needs: Not on file   Physical Activity: Not on file   Stress: Not on file   Social Connections: Not on file   Housing Stability: Not on file     Family History   Problem Relation Age of Onset    Diabetes Sister     Hypertension Father     Heart Disorder Mother     Cancer Mother     Lung Disorder Mother     Other (Other) Maternal Grandmother     Other (Other) Maternal Grandfather     Other (Other) Paternal Grandmother     Other (Other) Paternal Grandfather     Hypertension Sister 72    Breast Cancer Neg     Ovarian Cancer Neg        There were no vitals filed for this visit.    HPI:  Chief Complaint   Patient presents with    Actinic Keratosis     LOV 6/19/24. Pt presents for f/u regarding site of Ak removal on  R nasal dorsum. Has c/o 2 raised lesion on either side of neck, near hair line for 6 months or longer. Denies itching or pain. Pt admits to scratching the lesions at times. Denies personal or family Hx of skin cancer.         Follow-up AK  biopsied right nasal dorsum patient concerned with lesion crusted bleeding  Patient presents with concerns above.    Patient has been in their usual state of health.     Past notes/ records and appropriate/relevant lab results including pathology and past body maps reviewed. Including outside notes/ PCP notes as appropriate. Updated and new information noted in current visit.     ROS:  Denies other relevant systemic complaints.      History, medications, allergies reviewed as noted.       Physical Examination:     Well-developed well-nourished patient alert oriented in no acute distress.  Exam performed, including scalp, head, neck, face,nails, hair, external eyes, including conjunctival mucosa, eyelids, lips external ears , arms, digits,palms.     Multiple light to medium brown, well marginated, uniformly pigmented, macules and papules 6 mm and less scattered on exam. pigmented lesions examined with dermoscopy benign-appearing patterns.     Waxy tannish keratotic papules scattered, cherry-red vascular papules scattered.    See map today's date for lesions noted .  See assessment and plan below for specific lesions.    Otherwise remarkable for lesions as noted on map.    See A/P  below for additional information:    Assessment / plan:    No orders of the defined types were placed in this encounter.      Meds & Refills for this Visit:  Requested Prescriptions      No prescriptions requested or ordered in this encounter         Encounter Diagnoses   Name Primary?    AK (actinic keratosis) Yes    Multiple nevi     Benign neoplasm of skin, unspecified location     Lentigo     SK (seborrheic keratosis)     Syringoma    Patient seen for follow-up long-term monitoring, treatment of  Actinic keratoses, multiple changing lesions biopsy.  Recent past  Plan of care:  ongoing surveillance, monitoring including regular follow-up due to longer term risk of recurrence, new lesions.  See previous notes.  There is a longitudinal  care relationship with me, the care plan reflects the ongoing nature of the continuous relationship of care, and the medical record indicates that there is ongoing treatment of a serious/complex medical condition which I am currently managing.  is Applicable      Right nasal dorsum 6/24 folliculitis with histiocytic reaction    Biopsy 12/23 right nasal dorsum AK with erosion, right lateral breast inflamed SK hyperpigmented    AK's appear improved monitor continue sun protection consider retinol  Area appears stable monitor carefully  Actinic Keratoses.  Precancerous nature discussed. Sun protection, sunscreen/ blocks encouraged .  Monitoring for new lesions.  Sun damage additional recurrent and new actinic keratoses, skin cancers may occur in areas of prior actinic keratoses, related to past sun exposure to minimize current sun exposure.  Sunscreen applied consistently regularly, reapplication and sun protection while driving recommended.    Background of syringoma's over the eyelids noted previously asymptomatic  Patient frustrated with persistence.  Reviewed therapeutic options.  Encouraged follow-up with oculoplastics, Dr. Simental.    On examination multiple whitish dome-shaped smooth papules are noted.  Milia diagnosis , pathophysiology discussed.  Over-the-counter retinol products may cause peeling irritation.  Consider prescription Retin-A if worsening.    SKs on neck reassurance.  Consider cryo, removal if lesions do change.  Monitor    Other skin tags over the chest reassurance.  Please refer to map for specific lesions.  See additional diagnoses.  Pros cons of various therapies, risks benefits discussed.Pathophysiology discussed with patient.  Therapeutic options reviewed.  See  Medications in grid.  Instructions reviewed at length.    Benign nevi, seborrheic  keratoses, cherry angiomas:  Reassurance regarding other benign skin lesions.Signs and symptoms of skin cancer, ABCDE's of melanoma discussed with  patient. Sunscreen use, sun protection, self exams reviewed.  Followup as noted RTC ---routine checkup    6 mos -one year or p.r.n.    Encounter Times   Including precharting, reviewing chart, prior notes obtaining history: 10 minutes, medical exam :10 minutes, notes on body map, plan, counseling 10minutes My total time spent caring for the patient on the day of the encounter: 30 minutes     The patient indicates understanding of these issues and agrees to the plan.  The patient is asked to return as noted in follow-up/ above.    This note was generated using Dragon voice recognition software.  Please contact me regarding any confusion resulting from errors in recognition..  Note to patient and family: The 21st Century Cures Act makes medical notes like these available to patients. However, be advised this is a medical document. It is intended as [id number] communication and monitoring of a patient's care needs. It is written in medical language and may contain abbreviations or verbiage that are unfamiliar. It may appear blunt or direct. Medical documents are intended to carry relevant information, facts as evident and the clinical opinion of the practitioner.

## 2024-12-18 ENCOUNTER — OFFICE VISIT (OUTPATIENT)
Dept: ORTHOPEDICS CLINIC | Facility: CLINIC | Age: 69
End: 2024-12-18
Payer: MEDICARE

## 2024-12-18 VITALS — BODY MASS INDEX: 26.7 KG/M2 | HEIGHT: 60 IN | WEIGHT: 136 LBS

## 2024-12-18 DIAGNOSIS — M18.10 ARTHRITIS OF CARPOMETACARPAL (CMC) JOINT OF THUMB: Primary | ICD-10-CM

## 2024-12-18 PROCEDURE — 1159F MED LIST DOCD IN RCRD: CPT | Performed by: ORTHOPAEDIC SURGERY

## 2024-12-18 PROCEDURE — 1160F RVW MEDS BY RX/DR IN RCRD: CPT | Performed by: ORTHOPAEDIC SURGERY

## 2024-12-18 PROCEDURE — 1125F AMNT PAIN NOTED PAIN PRSNT: CPT | Performed by: ORTHOPAEDIC SURGERY

## 2024-12-18 PROCEDURE — 3008F BODY MASS INDEX DOCD: CPT | Performed by: ORTHOPAEDIC SURGERY

## 2024-12-18 PROCEDURE — 99204 OFFICE O/P NEW MOD 45 MIN: CPT | Performed by: ORTHOPAEDIC SURGERY

## 2024-12-18 PROCEDURE — 20600 DRAIN/INJ JOINT/BURSA W/O US: CPT | Performed by: ORTHOPAEDIC SURGERY

## 2024-12-18 RX ORDER — BETAMETHASONE SODIUM PHOSPHATE AND BETAMETHASONE ACETATE 3; 3 MG/ML; MG/ML
6 INJECTION, SUSPENSION INTRA-ARTICULAR; INTRALESIONAL; INTRAMUSCULAR; SOFT TISSUE ONCE
Status: COMPLETED | OUTPATIENT
Start: 2024-12-18 | End: 2024-12-18

## 2024-12-18 RX ADMIN — BETAMETHASONE SODIUM PHOSPHATE AND BETAMETHASONE ACETATE 6 MG: 3; 3 INJECTION, SUSPENSION INTRA-ARTICULAR; INTRALESIONAL; INTRAMUSCULAR; SOFT TISSUE at 09:40:00

## 2024-12-18 NOTE — H&P
Clinic Note     Assessment/Plan:  69 year old female    Right thumb CMC osteoarthritis with possible underlying STT arthritis-pain is mostly from the CMC joint.  We discussed surgical nonsurgical treatment options and elected to proceed with a corticosteroid injection and CMC bracing.  Alternative nonsurgical treatment options include turmeric 500 mg twice daily, CBD oil/cream.  Left thumb CMC osteoarthritis-less symptomatic.  If the CMC bracing on the right side is helpful we will fit her for the left side in the future    Follow Up: As needed    Injection:    Written consent was obtained.  The skin was prepped with alcohol.  Ethyl chloride spray was used anesthetize the superficial skin.  A 25-gauge needle was used to inject 1.5 mL mixture of 1 mL of 6 mg of betamethasone and 1 mL of 1% lidocaine into right thumb CMC joint.  Hemostasis achieved.  Band-Aid was applied.  Patient tolerated procedure without complication.    Diagnostic Studies:     XR Right hand 3 views: Advanced degenerative changes of the thumb CMC joint with subtle Z deformity noted.  There are degenerative changes of the STT joint as well.       Physical Exam:     Ht 5' (1.524 m)   Wt 136 lb (61.7 kg)   BMI 26.56 kg/m²     Constitutional: NAD. AOx3. Well-developed and Well-nourished.   Psychiatric: Normal mood/ affect/ behavior. Judgment and thought content normal.     Right Upper Extremity:     Inspection    Skin intact. No skin lesions. No obvious mass visualized.  Z deformity secondary to MCP hyperextension   Palpation    Most focally tender over the CMC joint.  Minimal pain over the STT joint.      ROM         Neurovascular    Normal sensation in the median, ulnar, and radial nerve distribution. Normal motor function of muscles innervated by median/AIN, ulnar, and radial/PIN nerves.    Normally perfused hand(s).     Special    Pain with CMC seesaw test          CC: Right wrist/hand pain    HPI: This 69 year old RHD female presents with  right wrist/hand pain.  Been going on for a number of years.  Patient reports severe pain localized to the base of the thumb.  She describes the pain as throbbing, aching, sharp and shooting in quality.  She has tried a number of topical ointments including Voltaren gel and Biofreeze.  She will also use Tylenol and a brace for comfort.  She does report pain that wakes her up at nighttime she does have difficulty falling asleep secondary to the pain.    Occupation:     History/Other:   Past Medical History:    Actinic keratosis    right nasal dorsum    Anxiety    Chicken pox    Folliculitis    right nasal dorsum- dermal infiltrate with histiocytes consistent with ruptured folliculitis    Heart murmur    Measles    Osteoarthritis    Other and unspecified hyperlipidemia    Primary osteoarthritis of left shoulder    Unspecified essential hypertension    Visual impairment    glasses     Past Surgical History:   Procedure Laterality Date    Colonoscopy  2006    Colonoscopy      Egd      Electrocardiogram, complete  07/05/2005    Scanned to media tab     Hysterectomy      Korey localization wire 1 site right (cpt=19281) Right 2010    Other  2020    left shoulder replacement     Other surgical history      breast biopsy     Thyroidectomy      Upper gi endoscopy performed  5/2015     Current Outpatient Medications   Medication Sig Dispense Refill    ALPRAZolam 0.25 MG Oral Tab Take 1 tablet (0.25 mg total) by mouth 2 (two) times daily. 60 tablet 0    amLODIPine 5 MG Oral Tab Take 1 tablet (5 mg total) by mouth daily. 90 tablet 3    atorvastatin 20 MG Oral Tab Take 1 tablet (20 mg total) by mouth every evening. 90 tablet 3    Fosinopril Sodium-HCTZ 20-12.5 MG Oral Tab Take 1 tablet by mouth daily. 90 tablet 3     Allergies[1]  Family History   Problem Relation Age of Onset    Diabetes Sister     Hypertension Father     Heart Disorder Mother     Cancer Mother     Lung Disorder Mother     Other (Other) Maternal  Grandmother     Other (Other) Maternal Grandfather     Other (Other) Paternal Grandmother     Other (Other) Paternal Grandfather     Hypertension Sister 72    Breast Cancer Neg     Ovarian Cancer Neg      Social History     Occupational History    Not on file   Tobacco Use    Smoking status: Former     Current packs/day: 0.00     Types: Cigarettes     Quit date: 1/10/1990     Years since quittin.9    Smokeless tobacco: Never   Vaping Use    Vaping status: Never Used   Substance and Sexual Activity    Alcohol use: No     Comment: occ.    Drug use: No    Sexual activity: Not on file          Review of Systems (negative unless bolded):  General: fevers, chills, fatigue  CV:  chest pain, palpitations, leg swelling  Msk: bodyaches, neck pain, neck stiffness  Skin: rashes, open wounds, nonhealing ulcers  Hem: bleeds easily, bruise easily, immunocompromised  Neuro: dizziness, light headedness, headaches  Psych: anxious, depressed, anger issues      Elias Maguire MD   Hand, Wrist, [facility] Elbow Surgery  [email or web address]  t: [phone]  f: [phone]         [1] No Known Allergies

## 2024-12-19 RX ORDER — ALPRAZOLAM 0.25 MG/1
0.25 TABLET ORAL 2 TIMES DAILY
Qty: 60 TABLET | Refills: 0 | Status: SHIPPED | OUTPATIENT
Start: 2024-12-19

## 2024-12-19 NOTE — TELEPHONE ENCOUNTER
Anita ALMANZA, please kindly review; protocol failed    No future appointments.  Last office visit: 11/19/2024    Recent fill dates:   11/07/24, 10/1/24, and 8/14/24  Date of  last written prescription:      Prescription written on 11/6/24 for qty of: #60 each (processed as a 30 day supply)       - Taken twice daily as needed    Requested Prescriptions   Pending Prescriptions Disp Refills    ALPRAZolam 0.25 MG Oral Tab 60 tablet 0     Sig: Take 1 tablet (0.25 mg total) by mouth 2 (two) times daily.       Controlled Substance Medication Failed - 12/19/2024 12:38 PM        Failed - This medication is a controlled substance - forward to provider to refill               Recent Outpatient Visits              Yesterday Arthritis of carpometacarpal (CMC) joint of thumb    [facility], [place], Kushal, MD    Office Visit    2 weeks ago AK (actinic keratosis)    [facility], Nataly Zamudio MD    Office Visit    1 month ago [facility]umb pain, right    [facility]    [facility]    3 months ago Medicare annual wellness [facility]    [facility] Visit    4 months ago Altitude sickness preventative measures    [facility]    [facility]

## 2025-01-15 ENCOUNTER — NURSE TRIAGE (OUTPATIENT)
Dept: INTERNAL MEDICINE CLINIC | Facility: CLINIC | Age: 70
End: 2025-01-15

## 2025-01-15 ENCOUNTER — TELEPHONE (OUTPATIENT)
Dept: INTERNAL MEDICINE CLINIC | Facility: CLINIC | Age: 70
End: 2025-01-15

## 2025-01-15 NOTE — TELEPHONE ENCOUNTER
Action Requested: Summary for Provider     []  Critical Lab, Recommendations Needed  [] Need Additional Advice  []   FYI    []   Need Orders  [] Need Medications Sent to Pharmacy  [x]  Other     SUMMARY: Verified name and  of patient.     of patient (on release of information) calling with patient beside him- states that patient has had headaches for the past couple of days- needing to take Aspirin throughout the day for the headaches.    Appointment scheduled per protocol:  Future Appointments   Date Time Provider Department Center   2025 11:00 AM Faraz Gutierrez MD [facility]       Reason for call: Acute  Onset: Data Unavailable          Reason for Disposition   Unexplained headache that is present > 24 hours    Protocols used: Headache-A-[place]

## 2025-01-16 ENCOUNTER — OFFICE VISIT (OUTPATIENT)
Dept: INTERNAL MEDICINE CLINIC | Facility: CLINIC | Age: 70
End: 2025-01-16

## 2025-01-16 ENCOUNTER — TELEPHONE (OUTPATIENT)
Dept: INTERNAL MEDICINE CLINIC | Facility: CLINIC | Age: 70
End: 2025-01-16

## 2025-01-16 VITALS
BODY MASS INDEX: 26.7 KG/M2 | SYSTOLIC BLOOD PRESSURE: 157 MMHG | DIASTOLIC BLOOD PRESSURE: 73 MMHG | HEART RATE: 65 BPM | WEIGHT: 136 LBS | OXYGEN SATURATION: 97 % | HEIGHT: 60 IN

## 2025-01-16 DIAGNOSIS — I10 ESSENTIAL HYPERTENSION: ICD-10-CM

## 2025-01-16 DIAGNOSIS — G44.89 OTHER HEADACHE SYNDROME: Primary | ICD-10-CM

## 2025-01-16 DIAGNOSIS — F41.1 GENERALIZED ANXIETY DISORDER: ICD-10-CM

## 2025-01-16 PROCEDURE — 1126F AMNT PAIN NOTED NONE PRSNT: CPT | Performed by: INTERNAL MEDICINE

## 2025-01-16 PROCEDURE — 3077F SYST BP >= 140 MM HG: CPT | Performed by: INTERNAL MEDICINE

## 2025-01-16 PROCEDURE — 3008F BODY MASS INDEX DOCD: CPT | Performed by: INTERNAL MEDICINE

## 2025-01-16 PROCEDURE — 3078F DIAST BP <80 MM HG: CPT | Performed by: INTERNAL MEDICINE

## 2025-01-16 PROCEDURE — 99214 OFFICE O/P EST MOD 30 MIN: CPT | Performed by: INTERNAL MEDICINE

## 2025-01-16 RX ORDER — BUTALBITAL, ACETAMINOPHEN AND CAFFEINE 300; 40; 50 MG/1; MG/1; MG/1
1 CAPSULE ORAL 3 TIMES DAILY PRN
Qty: 30 CAPSULE | Refills: 0 | Status: SHIPPED | OUTPATIENT
Start: 2025-01-16 | End: 2025-01-30

## 2025-01-16 NOTE — TELEPHONE ENCOUNTER
Current Outpatient Medications:     Butalbital-APAP-Caffeine (FIORICET) -40 MG Oral Cap, Take 1 capsule by mouth 3 (three) times daily as needed., Disp: 30 capsule, Rfl: 0

## 2025-01-16 NOTE — PROGRESS NOTES
HPI:    Patient ID: Ashli Lange is a 69 year old female.    HPI    Headache for 4 days  cold symptoms started a week ago  Headache right side off and on  Pain meds OTC have not helped      /73 (BP Location: Right arm, Patient Position: Sitting, Cuff Size: adult)   Pulse 65   Ht 5' (1.524 m)   Wt 136 lb (61.7 kg)   SpO2 97%   BMI 26.56 kg/m²   Wt Readings from Last 6 Encounters:   01/21/25 135 lb (61.2 kg)   01/18/25 135 lb (61.2 kg)   01/16/25 136 lb (61.7 kg)   12/18/24 136 lb (61.7 kg)   11/19/24 136 lb (61.7 kg)   09/10/24 135 lb (61.2 kg)     Body mass index is 26.56 kg/m².  HGBA1C:    Lab Results   Component Value Date    A1C 5.6 12/02/2024    A1C 5.5 01/27/2024    A1C 5.5 11/16/2022     12/02/2024         Review of Systems   Constitutional:  Positive for fatigue. Negative for chills and fever.   HENT:  Positive for congestion. Negative for sinus pressure and sinus pain.    Respiratory:  Negative for cough and shortness of breath.    Gastrointestinal:  Negative for abdominal pain, diarrhea and nausea.   Genitourinary:  Negative for difficulty urinating.   Musculoskeletal:  Positive for arthralgias.   Neurological:  Positive for headaches. Negative for tremors, syncope, facial asymmetry, speech difficulty, weakness, light-headedness and numbness.   Psychiatric/Behavioral:  Negative for dysphoric mood. The patient is nervous/anxious.          Current Outpatient Medications   Medication Sig Dispense Refill    ALPRAZolam 0.25 MG Oral Tab Take 1 tablet (0.25 mg total) by mouth 2 (two) times daily. 60 tablet 0    amLODIPine 5 MG Oral Tab Take 1 tablet (5 mg total) by mouth daily. 90 tablet 3    atorvastatin 20 MG Oral Tab Take 1 tablet (20 mg total) by mouth every evening. 90 tablet 3    Fosinopril Sodium-HCTZ 20-12.5 MG Oral Tab Take 1 tablet by mouth daily. 90 tablet 3    amoxicillin clavulanate 875-125 MG Oral Tab Take 1 tablet by mouth 2 (two) times daily for 10 days. 20 tablet 0     Fluticasone Propionate (FLONASE NA) by Nasal route.      diphenhydrAMINE HCl (BENADRYL ALLERGY) 25 MG Oral Tab Take 1 tablet (25 mg total) by mouth every 6 (six) hours as needed for Itching.      Saccharomyces boulardii (FLORASTOR OR)       acetaminophen 325 MG Oral Tab Take 2 tablets (650 mg total) by mouth every 6 (six) hours as needed for Pain. 20 tablet 0     Allergies:Allergies[1]    HISTORY:  Past Medical History:    Actinic keratosis    right nasal dorsum    Anxiety    Chicken pox    Folliculitis    right nasal dorsum- dermal infiltrate with histiocytes consistent with ruptured folliculitis    Heart murmur    Measles    Osteoarthritis    Other and unspecified hyperlipidemia    Primary osteoarthritis of left shoulder    Unspecified essential hypertension    Visual impairment    glasses      Past Surgical History:   Procedure Laterality Date    Colonoscopy      Colonoscopy      Egd      Electrocardiogram, complete  2005    Scanned to media tab     Hysterectomy      Korey localization wire 1 site right (cpt=19281) Right 2010    Other  2020    left shoulder replacement     Other surgical history      breast biopsy     Thyroidectomy      Upper gi endoscopy performed  2015      Family History   Problem Relation Age of Onset    Diabetes Sister     Hypertension Father     Heart Disorder Mother     Cancer Mother     Lung Disorder Mother     Other (Other) Maternal Grandmother     Other (Other) Maternal Grandfather     Other (Other) Paternal Grandmother     Other (Other) Paternal Grandfather     Hypertension Sister 72    Breast Cancer Neg     Ovarian Cancer Neg       Social History:   Social History     Socioeconomic History    Marital status:    Tobacco Use    Smoking status: Former     Current packs/day: 0.00     Types: Cigarettes     Quit date: 1/10/1990     Years since quittin.0     Passive exposure: Past    Smokeless tobacco: Never   Vaping Use    Vaping status: Never Used   Substance and  Sexual Activity    Alcohol use: No     Comment: occ.    Drug use: No   Other Topics Concern    Caffeine Concern Yes     Comment: Occasionally; coffee     Reaction to local anesthetic No    Pt has a pacemaker No    Pt has a defibrillator No     Social Drivers of Health     Transportation Needs: No Transportation Needs (1/23/2025)    Transportation Needs     Lack of Transportation: No        PHYSICAL EXAM:    Physical Exam  Constitutional:       Appearance: She is well-developed. She is not ill-appearing.   HENT:      Right Ear: Ear canal normal.      Left Ear: Ear canal normal.      Mouth/Throat:      Pharynx: Oropharynx is clear.   Eyes:      Extraocular Movements: Extraocular movements intact.      Conjunctiva/sclera: Conjunctivae normal.      Pupils: Pupils are equal, round, and reactive to light.   Cardiovascular:      Rate and Rhythm: Normal rate and regular rhythm.      Heart sounds: Normal heart sounds.   Pulmonary:      Effort: Pulmonary effort is normal.      Breath sounds: Normal breath sounds.   Abdominal:      General: Bowel sounds are normal.      Palpations: Abdomen is soft.   Skin:     General: Skin is warm and dry.   Neurological:      General: No focal deficit present.      Mental Status: She is alert.      Gait: Gait normal.   Psychiatric:         Mood and Affect: Mood normal.         Behavior: Behavior normal.              ASSESSMENT/PLAN:   (G44.89) Other headache syndrome  (primary encounter diagnosis)  Plan: Butalbital-APAP-Caffeine (FIORICET) -40   tension type           MG Oral Cap      Trial of med  side effect discussed    Follow up if not better        (F41.1) Generalized anxiety disorder  Plan:chronic  prn med  prn alprazolam    HTN  /73 (BP Location: Right arm, Patient Position: Sitting, Cuff Size: adult)   Pulse 65   Ht 5' (1.524 m)   Wt 136 lb (61.7 kg)   SpO2 97%   BMI 26.56 kg/m²    Pt not feeling well  Low salt diet advised  cont current med  Monitor blood pressure  daily and record  Follow up in a month    Patient voiced understanding  and agrees with plan          Meds This Visit:  Requested Prescriptions     Signed Prescriptions Disp Refills    Butalbital-APAP-Caffeine (FIORICET) -40 MG Oral Cap 30 capsule 0     Sig: Take 1 capsule by mouth 3 (three) times daily as needed.     Patient not taking: Reported on 1/23/2025       Imaging & Referrals:  None        ID#[id number]           [1] No Known Allergies

## 2025-01-17 ENCOUNTER — PATIENT MESSAGE (OUTPATIENT)
Dept: INTERNAL MEDICINE CLINIC | Facility: CLINIC | Age: 70
End: 2025-01-17

## 2025-01-18 ENCOUNTER — APPOINTMENT (OUTPATIENT)
Dept: CT IMAGING | Facility: HOSPITAL | Age: 70
End: 2025-01-18
Attending: EMERGENCY MEDICINE
Payer: MEDICARE

## 2025-01-18 ENCOUNTER — PATIENT MESSAGE (OUTPATIENT)
Dept: INTERNAL MEDICINE CLINIC | Facility: CLINIC | Age: 70
End: 2025-01-18

## 2025-01-18 ENCOUNTER — HOSPITAL ENCOUNTER (EMERGENCY)
Facility: HOSPITAL | Age: 70
Discharge: HOME | End: 2025-01-18
Attending: EMERGENCY MEDICINE
Payer: MEDICARE

## 2025-01-18 ENCOUNTER — TELEPHONE (OUTPATIENT)
Dept: INTERNAL MEDICINE CLINIC | Facility: CLINIC | Age: 70
End: 2025-01-18

## 2025-01-18 VITALS
OXYGEN SATURATION: 96 % | RESPIRATION RATE: 16 BRPM | SYSTOLIC BLOOD PRESSURE: 116 MMHG | HEIGHT: 60 IN | HEART RATE: 66 BPM | WEIGHT: 135 LBS | TEMPERATURE: 98 F | BODY MASS INDEX: 26.5 KG/M2 | DIASTOLIC BLOOD PRESSURE: 57 MMHG

## 2025-01-18 DIAGNOSIS — R51.9 NONINTRACTABLE HEADACHE, UNSPECIFIED CHRONICITY PATTERN, UNSPECIFIED HEADACHE TYPE: Primary | ICD-10-CM

## 2025-01-18 DIAGNOSIS — J01.90 ACUTE SINUSITIS, RECURRENCE NOT SPECIFIED, UNSPECIFIED LOCATION: ICD-10-CM

## 2025-01-18 LAB
ALBUMIN SERPL-MCNC: 4.9 G/DL (ref 3.2–4.8)
ALBUMIN/GLOB SERPL: 2 {RATIO} (ref 1–2)
ALP LIVER SERPL-CCNC: 69 U/L
ALT SERPL-CCNC: 78 U/L
ANION GAP SERPL CALC-SCNC: 13 MMOL/L (ref 0–18)
AST SERPL-CCNC: 43 U/L (ref ?–34)
BASOPHILS # BLD AUTO: 0.06 X10(3) UL (ref 0–0.2)
BASOPHILS NFR BLD AUTO: 0.6 %
BILIRUB SERPL-MCNC: 0.3 MG/DL (ref 0.2–1.1)
BUN BLD-MCNC: 14 MG/DL (ref 9–23)
BUN/CREAT SERPL: 18.2 (ref 10–20)
CALCIUM BLD-MCNC: 10.2 MG/DL (ref 8.7–10.4)
CHLORIDE SERPL-SCNC: 109 MMOL/L (ref 98–112)
CO2 SERPL-SCNC: 22 MMOL/L (ref 21–32)
CREAT BLD-MCNC: 0.77 MG/DL
DEPRECATED RDW RBC AUTO: 40.2 FL (ref 35.1–46.3)
EGFRCR SERPLBLD CKD-EPI 2021: 84 ML/MIN/1.73M2 (ref 60–?)
EOSINOPHIL # BLD AUTO: 0.14 X10(3) UL (ref 0–0.7)
EOSINOPHIL NFR BLD AUTO: 1.5 %
ERYTHROCYTE [DISTWIDTH] IN BLOOD BY AUTOMATED COUNT: 12.7 % (ref 11–15)
GLOBULIN PLAS-MCNC: 2.4 G/DL (ref 2–3.5)
GLUCOSE BLD-MCNC: 94 MG/DL (ref 70–99)
HCT VFR BLD AUTO: 33.9 %
HGB BLD-MCNC: 11.7 G/DL
IMM GRANULOCYTES # BLD AUTO: 0.05 X10(3) UL (ref 0–1)
IMM GRANULOCYTES NFR BLD: 0.5 %
LYMPHOCYTES # BLD AUTO: 3.09 X10(3) UL (ref 1–4)
LYMPHOCYTES NFR BLD AUTO: 32.3 %
MAGNESIUM SERPL-MCNC: 1.8 MG/DL (ref 1.6–2.6)
MCH RBC QN AUTO: 30.2 PG (ref 26–34)
MCHC RBC AUTO-ENTMCNC: 34.5 G/DL (ref 31–37)
MCV RBC AUTO: 87.4 FL
MONOCYTES # BLD AUTO: 0.59 X10(3) UL (ref 0.1–1)
MONOCYTES NFR BLD AUTO: 6.2 %
NEUTROPHILS # BLD AUTO: 5.65 X10 (3) UL (ref 1.5–7.7)
NEUTROPHILS # BLD AUTO: 5.65 X10(3) UL (ref 1.5–7.7)
NEUTROPHILS NFR BLD AUTO: 58.9 %
OSMOLALITY SERPL CALC.SUM OF ELEC: 298 MOSM/KG (ref 275–295)
PLATELET # BLD AUTO: 348 10(3)UL (ref 150–450)
POTASSIUM SERPL-SCNC: 3 MMOL/L (ref 3.5–5.1)
PROT SERPL-MCNC: 7.3 G/DL (ref 5.7–8.2)
RBC # BLD AUTO: 3.88 X10(6)UL
SODIUM SERPL-SCNC: 144 MMOL/L (ref 136–145)
WBC # BLD AUTO: 9.6 X10(3) UL (ref 4–11)

## 2025-01-18 PROCEDURE — 99284 EMERGENCY DEPT VISIT MOD MDM: CPT

## 2025-01-18 PROCEDURE — 70450 CT HEAD/BRAIN W/O DYE: CPT | Performed by: EMERGENCY MEDICINE

## 2025-01-18 PROCEDURE — 96374 THER/PROPH/DIAG INJ IV PUSH: CPT

## 2025-01-18 PROCEDURE — 80053 COMPREHEN METABOLIC PANEL: CPT | Performed by: EMERGENCY MEDICINE

## 2025-01-18 PROCEDURE — 96361 HYDRATE IV INFUSION ADD-ON: CPT

## 2025-01-18 PROCEDURE — 83735 ASSAY OF MAGNESIUM: CPT | Performed by: EMERGENCY MEDICINE

## 2025-01-18 PROCEDURE — 85025 COMPLETE CBC W/AUTO DIFF WBC: CPT | Performed by: EMERGENCY MEDICINE

## 2025-01-18 PROCEDURE — 96375 TX/PRO/DX INJ NEW DRUG ADDON: CPT

## 2025-01-18 RX ORDER — DIPHENHYDRAMINE HYDROCHLORIDE 50 MG/ML
25 INJECTION INTRAMUSCULAR; INTRAVENOUS ONCE
Status: COMPLETED | OUTPATIENT
Start: 2025-01-18 | End: 2025-01-18

## 2025-01-18 RX ORDER — ACETAMINOPHEN 325 MG/1
650 TABLET ORAL EVERY 6 HOURS PRN
Qty: 20 TABLET | Refills: 0 | Status: SHIPPED | OUTPATIENT
Start: 2025-01-18

## 2025-01-18 RX ORDER — POTASSIUM CHLORIDE 1500 MG/1
40 TABLET, EXTENDED RELEASE ORAL ONCE
Status: COMPLETED | OUTPATIENT
Start: 2025-01-18 | End: 2025-01-18

## 2025-01-18 RX ORDER — KETOROLAC TROMETHAMINE 15 MG/ML
15 INJECTION, SOLUTION INTRAMUSCULAR; INTRAVENOUS ONCE
Status: COMPLETED | OUTPATIENT
Start: 2025-01-18 | End: 2025-01-18

## 2025-01-18 RX ORDER — METOCLOPRAMIDE HYDROCHLORIDE 5 MG/ML
10 INJECTION INTRAMUSCULAR; INTRAVENOUS ONCE
Status: COMPLETED | OUTPATIENT
Start: 2025-01-18 | End: 2025-01-18

## 2025-01-18 RX ORDER — IBUPROFEN 600 MG/1
600 TABLET, FILM COATED ORAL EVERY 8 HOURS PRN
Qty: 20 TABLET | Refills: 0 | Status: SHIPPED | OUTPATIENT
Start: 2025-01-18 | End: 2025-01-25

## 2025-01-18 NOTE — TELEPHONE ENCOUNTER
Patient spouse called and is asking if  would be able to prescribe a different medication to her, patient is still having headaches and feels that current medication is not working, patient spouse asking for something until Monday till she gets her MRI done.       [id number] - [place], [place] - [address] AT [facility] [address],

## 2025-01-18 NOTE — TELEPHONE ENCOUNTER
Office note from 1/16/2025 not completed yet.  Attempted call x 2 to spouse, no answer/went to busy.    No future appointments.  No order for MRI seen in chart.     Zeynep Carrington  10/14/24    Care Coordination  placed call to Patient  to arrange RPM kit  through [employer].     [facility] spoke to Patient reviewed with Patient how to pack equipment in original packing. [employer] will  equipment in 2-4 days.   All questions and concerns answered.

## 2025-01-18 NOTE — TELEPHONE ENCOUNTER
Condition update:  Called home phone number and spoke with spouse who verified patient's name/.  He reports patient with headache since .  No relief with Fioricet but when she took ibuprofen 400 mg with it, she got relief for about 20 minutes, then the headache came back.  Denies nausea/vomiting.  Patient able to eat/drink.  He rates patient's pain at 10/10.  At present there are no imaging orders written.  Advised him that the standard of care for intractable headache is to report to the Emergency Department.  He states understanding but wants message sent to Dr Gutierrez to ask if she can get different pain medication?  Made aware that Dr Gutierrez is out of the office this weekend but he still requests message be sent.  Dr Gutierrez, please advise?

## 2025-01-18 NOTE — DISCHARGE INSTRUCTIONS
Take all medications as prescribed.  Follow-up with your primary care provider in 1 to 2 days.  Return to the ER if some continue, get worse, unable to follow-up

## 2025-01-18 NOTE — ED PROVIDER NOTES
Patient Seen in: [facility] Emergency Department    History     Chief Complaint   Patient presents with    Headache       HPI    69-year-old female who presents here today with a right-sided temporal headache that is been on and off for the past week.  Patient was prescribed medicine by her doctor with little relief.  Denies any fevers and chills not sure moving her neck no trouble bending over.  No chest pain or shortness of breath.    History reviewed.   Past Medical History:    Actinic keratosis    right nasal dorsum    Anxiety    Chicken pox    Folliculitis    right nasal dorsum- dermal infiltrate with histiocytes consistent with ruptured folliculitis    Heart murmur    Measles    Osteoarthritis    Other and unspecified hyperlipidemia    Primary osteoarthritis of left shoulder    Unspecified essential hypertension    Visual impairment    glasses       History reviewed.   Past Surgical History:   Procedure Laterality Date    Colonoscopy  2006    Colonoscopy      Egd      Electrocardiogram, complete  07/05/2005    Scanned to media tab     Hysterectomy      Korey localization wire 1 site right (cpt=19281) Right 2010    Other  2020    left shoulder replacement     Other surgical history      breast biopsy     Thyroidectomy      Upper gi endoscopy performed  5/2015         Medications :  Prescriptions Prior to Admission[1]     Family History   Problem Relation Age of Onset    Diabetes Sister     Hypertension Father     Heart Disorder Mother     Cancer Mother     Lung Disorder Mother     Other (Other) Maternal Grandmother     Other (Other) Maternal Grandfather     Other (Other) Paternal Grandmother     Other (Other) Paternal Grandfather     Hypertension Sister 72    Breast Cancer Neg     Ovarian Cancer Neg        Smoking Status:   Social History     Socioeconomic History    Marital status:    Tobacco Use    Smoking status: Former     Current packs/day: 0.00     Types: Cigarettes     Quit date: 1/10/1990      Years since quittin.0     Passive exposure: Past    Smokeless tobacco: Never   Vaping Use    Vaping status: Never Used   Substance and Sexual Activity    Alcohol use: No     Comment: occ.    Drug use: No   Other Topics Concern    Caffeine Concern Yes     Comment: Occasionally; coffee     Reaction to local anesthetic No    Pt has a pacemaker No    Pt has a defibrillator No       Constitutional and vital signs reviewed.      Social History and Family History elements reviewed from today, pertinent positives to the presenting problem noted.    Physical Exam     ED Triage Vitals [25 1336]   /75   Pulse 70   Resp 16   Temp 98.2 °F (36.8 °C)   Temp src Oral   SpO2 95 %   O2 Device None (Room air)       All measures to prevent infection transmission during my interaction with the patient were taken. Handwashing was performed prior to and after the exam.  Stethoscope and any equipment used during my examination was cleaned with super sani-cloth germicidal wipes following the exam.     Physical Exam  Vitals and nursing note reviewed.   HENT:      Head: Normocephalic.   Cardiovascular:      Rate and Rhythm: Normal rate.      Pulses: Normal pulses.   Pulmonary:      Effort: Pulmonary effort is normal.   Abdominal:      Palpations: Abdomen is soft.   Skin:     General: Skin is warm and dry.      Capillary Refill: Capillary refill takes less than 2 seconds.   Neurological:      General: No focal deficit present.      Mental Status: She is alert.   Psychiatric:         Mood and Affect: Mood normal.         ED Course        Labs Reviewed   CBC WITH DIFFERENTIAL WITH PLATELET - Abnormal; Notable for the following components:       Result Value    HGB 11.7 (*)     HCT 33.9 (*)     All other components within normal limits   COMP METABOLIC PANEL (14) - Abnormal; Notable for the following components:    Potassium 3.0 (*)     Calculated Osmolality 298 (*)     ALT 78 (*)     AST 43 (*)     Albumin 4.9 (*)     All  other components within normal limits   MAGNESIUM - Normal   RAINBOW DRAW LAVENDER   RAINBOW DRAW LIGHT GREEN       As Interpreted by me    Imaging Results Available and Reviewed while in ED: CT BRAIN OR HEAD (CPT=70450)    Result Date: 1/18/2025  CONCLUSION:  1. No acute intracranial process by noncontrast CT technique.  2. Senescent changes of parenchymal volume loss with sequela of chronic microvascular ischemic disease. There is also large vessel atherosclerosis.   3. Trace subtotal right mastoid effusion; correlation for relevant symptomatology is suggested.  4. Scattered paranasal sinus disease is seen.   5. Lesser incidental findings as above.    Dictated by (CST): Facundo Gibbs MD on 1/18/2025 at 5:42 PM     Finalized by (CST): Facundo Gibbs MD on 1/18/2025 at 5:45 PM         ED Medications Administered:   Medications   potassium chloride (Klor-Con M20) tab 40 mEq (has no administration in time range)   ketorolac (Toradol) 15 MG/ML injection 15 mg (15 mg Intravenous Given 1/18/25 1617)   metoclopramide (Reglan) 5 mg/mL injection 10 mg (10 mg Intravenous Given 1/18/25 1617)   diphenhydrAMINE (Benadryl) 50 mg/mL  injection 25 mg (25 mg Intravenous Given 1/18/25 1617)   sodium chloride 0.9 % IV bolus 1,000 mL (1,000 mL Intravenous New Bag 1/18/25 1617)         MDM     Vitals:    01/18/25 1621 01/18/25 1636 01/18/25 1651 01/18/25 1706   BP: 148/77 126/67     Pulse: 65 57 66 66   Resp:       Temp:       TempSrc:       SpO2: 97% 95% 96% 97%   Weight:       Height:         *I personally reviewed and interpreted all ED vitals.    Pulse Ox: 95%, Room air, Normal     Monitor Interpretation:   normal sinus rhythm as interpreted by me.  The cardiac monitor was ordered to monitor cardiac rate and rhythm.    Differential Diagnosis/ Diagnostic Considerations: Tension headache, sinus infection, migraine    Complicating Factors: The patient already has does not have any pertinent problems on file. to contribute to the  complexity of this ED evaluation.    Medical Decision Making  Amount and/or Complexity of Data Reviewed  Labs: ordered. Decision-making details documented in ED Course.  Radiology: ordered and independent interpretation performed. Decision-making details documented in ED Course.    Risk  OTC drugs.  Prescription drug management.      Patient's headache has improved.  I reviewed all results with patient.  Her potassium slightly low and I replaced orally in the ED.  Explained the patient she has some fluid in the mastoid which could be contributing to her headache since it is on the same side her headache is.  Will treat as a mastoid sinus infection.  With Augmentin.  Also give Tylenol ibuprofen as needed for pain.  Patient understands to take all these medications as prescribed.  Follow-up with her primary care provider in 1 to 2 days.  Return to the ER if some continue, get worse, unable to follow-up  Condition upon leaving the department: Stable    Disposition and Plan     Clinical Impression:  1. Nonintractable headache, unspecified chronicity pattern, unspecified headache type    2. Acute sinusitis, recurrence not specified, unspecified location        Disposition:  Discharge    Follow-up:  Faraz Gutierrez MD  [address] [place] [id number]  [phone]    Follow up        Medications Prescribed:  Current Discharge Medication List        START taking these medications    Details   ibuprofen 600 MG Oral Tab Take 1 tablet (600 mg total) by mouth every 8 (eight) hours as needed for Pain or Fever.  Qty: 20 tablet, Refills: 0      acetaminophen 325 MG Oral Tab Take 2 tablets (650 mg total) by mouth every 6 (six) hours as needed for Pain.  Qty: 20 tablet, Refills: 0      amoxicillin clavulanate 875-125 MG Oral Tab Take 1 tablet by mouth 2 (two) times daily for 10 days.  Qty: 20 tablet, Refills: 0                              [1] (Not in a hospital admission)

## 2025-01-19 DIAGNOSIS — F41.1 GAD (GENERALIZED ANXIETY DISORDER): ICD-10-CM

## 2025-01-20 ENCOUNTER — PATIENT OUTREACH (OUTPATIENT)
Dept: CASE MANAGEMENT | Age: 70
End: 2025-01-20

## 2025-01-20 DIAGNOSIS — G44.89 OTHER HEADACHE SYNDROME: ICD-10-CM

## 2025-01-20 RX ORDER — BUTALBITAL, ACETAMINOPHEN AND CAFFEINE 300; 40; 50 MG/1; MG/1; MG/1
1 CAPSULE ORAL 3 TIMES DAILY PRN
Qty: 30 CAPSULE | Refills: 0 | Status: CANCELLED | OUTPATIENT
Start: 2025-01-20 | End: 2025-02-03

## 2025-01-20 NOTE — TELEPHONE ENCOUNTER
Spoke with patient's  per MECHE, Date of Birth verified  He stated patient saw Dr Gutierrez last week for headache, she was also seen at ER 2 days ago 1/18 for headache.   He is requesting med refill for patient, Rx pended.   pls advise, thanks in advance.       Requested Prescriptions     Pending Prescriptions Disp Refills    Butalbital-APAP-Caffeine (FIORICET) -40 MG Oral Cap 30 capsule 0     Sig: Take 1 capsule by mouth 3 (three) times daily as needed.       Last Office Visit with PCP: 1/16/2025    Future Appointments   Date Time Provider Department Center   1/21/2025  3:00 PM Faraz Gutierrez MD [facility]

## 2025-01-20 NOTE — TELEPHONE ENCOUNTER
Patient was called for another encounter( acute)  Pharmacy was called and inform that patient picked up medication on 1/16/25 #30. Per pharmacist patient should have medication as she was given 10 days supply.  Patient was inform of this information. She stated that she will see Dr. Gutierrez tomorrow and discuss this as this medication is to expensive. Patient stated that she still has a few pills left.     Future Appointments   Date Time Provider Department Center   1/21/2025  3:00 PM Faraz Gutierrez MD [facility]

## 2025-01-20 NOTE — TELEPHONE ENCOUNTER
Patient seen in the ED on 1/18, was given prescription for ibuprofen and acetaminophen. Left message to call back for followup.

## 2025-01-20 NOTE — TELEPHONE ENCOUNTER
Patient did go to ER on 1/18/25 at 1:20 pm.   Patient was called and she stated that she will see Dr. Gutierrez tomorrow to discuss a different medication. Please see refill request from today. 1/20/25    Future Appointments   Date Time Provider Department Center   1/21/2025  3:00 PM Faraz Gutierrez MD [facility]

## 2025-01-21 ENCOUNTER — OFFICE VISIT (OUTPATIENT)
Dept: INTERNAL MEDICINE CLINIC | Facility: CLINIC | Age: 70
End: 2025-01-21

## 2025-01-21 VITALS
HEART RATE: 66 BPM | SYSTOLIC BLOOD PRESSURE: 136 MMHG | BODY MASS INDEX: 26.5 KG/M2 | WEIGHT: 135 LBS | DIASTOLIC BLOOD PRESSURE: 90 MMHG | HEIGHT: 60 IN

## 2025-01-21 DIAGNOSIS — E78.5 HYPERLIPIDEMIA, UNSPECIFIED HYPERLIPIDEMIA TYPE: ICD-10-CM

## 2025-01-21 DIAGNOSIS — R51.9 ACUTE INTRACTABLE HEADACHE, UNSPECIFIED HEADACHE TYPE: Primary | ICD-10-CM

## 2025-01-21 DIAGNOSIS — E87.6 HYPOKALEMIA: ICD-10-CM

## 2025-01-21 DIAGNOSIS — F41.1 GAD (GENERALIZED ANXIETY DISORDER): ICD-10-CM

## 2025-01-21 PROCEDURE — 1159F MED LIST DOCD IN RCRD: CPT | Performed by: INTERNAL MEDICINE

## 2025-01-21 PROCEDURE — 1111F DSCHRG MED/CURRENT MED MERGE: CPT | Performed by: INTERNAL MEDICINE

## 2025-01-21 PROCEDURE — 3008F BODY MASS INDEX DOCD: CPT | Performed by: INTERNAL MEDICINE

## 2025-01-21 PROCEDURE — 3075F SYST BP GE 130 - 139MM HG: CPT | Performed by: INTERNAL MEDICINE

## 2025-01-21 PROCEDURE — 3080F DIAST BP >= 90 MM HG: CPT | Performed by: INTERNAL MEDICINE

## 2025-01-21 PROCEDURE — 99214 OFFICE O/P EST MOD 30 MIN: CPT | Performed by: INTERNAL MEDICINE

## 2025-01-21 RX ORDER — DIPHENHYDRAMINE HCL 25 MG
25 TABLET ORAL EVERY 6 HOURS PRN
COMMUNITY

## 2025-01-22 ENCOUNTER — TELEPHONE (OUTPATIENT)
Dept: INTERNAL MEDICINE CLINIC | Facility: CLINIC | Age: 70
End: 2025-01-22

## 2025-01-22 NOTE — TELEPHONE ENCOUNTER
Prior auth questionnaire received from [employer]. Completed and faxed back.   Awaiting outcome.

## 2025-01-22 NOTE — TELEPHONE ENCOUNTER
Fioricet denied, not covered for indication of acute intractable right sided temporal headache

## 2025-01-22 NOTE — TELEPHONE ENCOUNTER
Patient was seen in the office by Dr. Gutierrez 01/21.  This is an old request. Encounter closed.

## 2025-01-22 NOTE — PROGRESS NOTES
HPI:    Patient ID: Ashli Lange is a 69 year old female.    HPI    Acute viral syndrome with persistent headache  pt was in the emergency room  Dx with sinusitis  CT scan reviewed  Her headache is 60 percent better taking ibuprofen and   augmentin  But recurrent and still severe at times    ER visit 1/18/25\  right-sided temporal headache that is been on and off for the past week. Patient was prescribed medicine by her doctor with little relief. Denies any fevers and chills not sure moving her neck no trouble bending over. No chest pain or shortness of breath.   Component      Latest Ref Rng 1/18/2025   WBC      4.0 - 11.0 x10(3) uL 9.6    RBC      3.80 - 5.30 x10(6)uL 3.88    Hemoglobin      12.0 - 16.0 g/dL 11.7 (L)    Hematocrit      35.0 - 48.0 % 33.9 (L)    MCV      80.0 - 100.0 fL 87.4    MCH      26.0 - 34.0 pg 30.2    MCHC      31.0 - 37.0 g/dL 34.5    RDW-SD      35.1 - 46.3 fL 40.2    RDW      11.0 - 15.0 % 12.7    Platelet Count      150.0 - 450.0 10(3)uL 348.0    Prelim Neutrophil Abs      1.50 - 7.70 x10 (3) uL 5.65    Neutrophils Absolute      1.50 - 7.70 x10(3) uL 5.65    Lymphocytes Absolute      1.00 - 4.00 x10(3) uL 3.09    Monocytes Absolute      0.10 - 1.00 x10(3) uL 0.59    Eosinophils Absolute      0.00 - 0.70 x10(3) uL 0.14    Basophils Absolute      0.00 - 0.20 x10(3) uL 0.06    Immature Granulocyte Absolute      0.00 - 1.00 x10(3) uL 0.05    Neutrophils %      % 58.9    Lymphocytes %      % 32.3    Monocytes %      % 6.2    Eosinophils %      % 1.5    Basophils %      % 0.6    Immature Granulocyte %      % 0.5    Glucose      70 - 99 mg/dL 94    Sodium      136 - 145 mmol/L 144    Potassium      3.5 - 5.1 mmol/L 3.0 (L)    Chloride      98 - 112 mmol/L 109    Carbon Dioxide, Total      21.0 - 32.0 mmol/L 22.0    ANION GAP      0 - 18 mmol/L 13    BUN      9 - 23 mg/dL 14    CREATININE      0.55 - 1.02 mg/dL 0.77    BUN/CREATININE RATIO      10.0 - 20.0  18.2    CALCIUM      8.7 - 10.4  mg/dL 10.2    CALCULATED OSMOLALITY      275 - 295 mOsm/kg 298 (H)    EGFR      >=60 mL/min/1.73m2 84    ALT (SGPT)      10 - 49 U/L 78 (H)    AST (SGOT)      <34 U/L 43 (H)    ALKALINE PHOSPHATASE      55 - 142 U/L 69    Total Bilirubin      0.2 - 1.1 mg/dL 0.3    PROTEIN, TOTAL      5.7 - 8.2 g/dL 7.3    Albumin      3.2 - 4.8 g/dL 4.9 (H)    Globulin      2.0 - 3.5 g/dL 2.4    A/G Ratio      1.0 - 2.0  2.0    Magnesium, Serum      1.6 - 2.6 mg/dL 1.8       Legend:  (L) Low  (H) High  /90   Pulse 66   Ht 5' (1.524 m)   Wt 135 lb (61.2 kg)   BMI 26.37 kg/m²   Wt Readings from Last 6 Encounters:   01/21/25 135 lb (61.2 kg)   01/18/25 135 lb (61.2 kg)   01/16/25 136 lb (61.7 kg)   12/18/24 136 lb (61.7 kg)   11/19/24 136 lb (61.7 kg)   09/10/24 135 lb (61.2 kg)     Body mass index is 26.37 kg/m².  HGBA1C:    Lab Results   Component Value Date    A1C 5.6 12/02/2024    A1C 5.5 01/27/2024    A1C 5.5 11/16/2022     12/02/2024         Review of Systems   Constitutional:  Negative for chills, fatigue and fever.   HENT:  Positive for sinus pressure. Negative for congestion.    Respiratory:  Negative for shortness of breath.    Cardiovascular:  Negative for chest pain and palpitations.   Gastrointestinal:  Negative for abdominal pain.   Neurological:  Positive for headaches. Negative for tremors, syncope, facial asymmetry, speech difficulty, weakness, light-headedness and numbness.         Current Outpatient Medications   Medication Sig Dispense Refill    Fluticasone Propionate (FLONASE NA) by Nasal route.      diphenhydrAMINE HCl (BENADRYL ALLERGY) 25 MG Oral Tab Take 1 tablet (25 mg total) by mouth every 6 (six) hours as needed for Itching.      Saccharomyces boulardii (FLORASTOR OR)       ibuprofen 600 MG Oral Tab Take 1 tablet (600 mg total) by mouth every 8 (eight) hours as needed for Pain or Fever. 20 tablet 0    acetaminophen 325 MG Oral Tab Take 2 tablets (650 mg total) by mouth every 6 (six) hours  as needed for Pain. 20 tablet 0    amoxicillin clavulanate 875-125 MG Oral Tab Take 1 tablet by mouth 2 (two) times daily for 10 days. 20 tablet 0    Butalbital-APAP-Caffeine (FIORICET) -40 MG Oral Cap Take 1 capsule by mouth 3 (three) times daily as needed. 30 capsule 0    ALPRAZolam 0.25 MG Oral Tab Take 1 tablet (0.25 mg total) by mouth 2 (two) times daily. 60 tablet 0    amLODIPine 5 MG Oral Tab Take 1 tablet (5 mg total) by mouth daily. 90 tablet 3    atorvastatin 20 MG Oral Tab Take 1 tablet (20 mg total) by mouth every evening. 90 tablet 3    Fosinopril Sodium-HCTZ 20-12.5 MG Oral Tab Take 1 tablet by mouth daily. 90 tablet 3     Allergies:Allergies[1]    HISTORY:  Past Medical History:    Actinic keratosis    right nasal dorsum    Anxiety    Chicken pox    Folliculitis    right nasal dorsum- dermal infiltrate with histiocytes consistent with ruptured folliculitis    Heart murmur    Measles    Osteoarthritis    Other and unspecified hyperlipidemia    Primary osteoarthritis of left shoulder    Unspecified essential hypertension    Visual impairment    glasses      Past Surgical History:   Procedure Laterality Date    Colonoscopy  2006    Colonoscopy      Egd      Electrocardiogram, complete  07/05/2005    Scanned to media tab     Hysterectomy      Korey localization wire 1 site right (cpt=19281) Right 2010    Other  2020    left shoulder replacement     Other surgical history      breast biopsy     Thyroidectomy      Upper gi endoscopy performed  5/2015      Family History   Problem Relation Age of Onset    Diabetes Sister     Hypertension Father     Heart Disorder Mother     Cancer Mother     Lung Disorder Mother     Other (Other) Maternal Grandmother     Other (Other) Maternal Grandfather     Other (Other) Paternal Grandmother     Other (Other) Paternal Grandfather     Hypertension Sister 72    Breast Cancer Neg     Ovarian Cancer Neg       Social History:   Social History     Socioeconomic History     Marital status:    Tobacco Use    Smoking status: Former     Current packs/day: 0.00     Types: Cigarettes     Quit date: 1/10/1990     Years since quittin.0     Passive exposure: Past    Smokeless tobacco: Never   Vaping Use    Vaping status: Never Used   Substance and Sexual Activity    Alcohol use: No     Comment: occ.    Drug use: No   Other Topics Concern    Caffeine Concern Yes     Comment: Occasionally; coffee     Reaction to local anesthetic No    Pt has a pacemaker No    Pt has a defibrillator No        PHYSICAL EXAM:    Physical Exam  PROCEDURE: CT BRAIN OR HEAD (CPT=70450)     COMPARISON: None available.     INDICATIONS: Headache.     TECHNIQUE: CT images were obtained without contrast material. Automated exposure control for dose reduction was used. Dose information was transmitted to the ACR (American College of Radiology) NRDR (National Radiology Data Registry), which includes the  Dose Index Registry.       FINDINGS:  CSF SPACES: The ventricles, cisterns, and sulci are dilated, but remain commensurate in caliber, consistent with parenchymal volume loss of a degree that is appropriate for age. No hydrocephalus, subarachnoid hemorrhage, or effacement of the basal  cisterns is appreciated. There is no extra-axial fluid collection.  CEREBRUM: No acute intraparenchymal hemorrhage, edema, or cortical sulcal effacement is apparent. There is no space-occupying lesion, mass effect, or shift of midline structures. The gray-white matter junction is preserved and bilaterally symmetric in  appearance.  Scattered periventricular and subcortical white matter hypodensities are present, consistent with chronic microvascular ischemic disease.  CEREBELLUM: No edema, hemorrhage, mass, or acute infarction is seen. There is cerebellar folial expansion consistent with atrophy.    BRAINSTEM: Patchy areas of low attenuation likely reflect sequela of chronic small vessel ischemic disease. No edema, hemorrhage,  mass, or acute infarction is seen. There is commensurate atrophy.    CALVARIUM: There is no apparent depressed fracture, mass, or other significant visible lesion.    SINUSES: Limited views demonstrate scattered mucosal thickening of the ethmoid air cells.  There is mild mucosal thickening involving the sphenoid ethmoidal recesses bilaterally. A left-sided jaqueline bullosa deformity is suggested. Mild S-shaped nasal  septal deviation is appreciated.    ORBITS: Limited views are grossly unremarkable.       OTHER: Atherosclerotic vascular calcifications are perceived in the cavernous carotid arteries. Subtotal right mastoid effusion is appreciated. Extensive dental amalgam is seen on the  view.                          Impression   CONCLUSION:  1. No acute intracranial process by noncontrast CT technique.     2. Senescent changes of parenchymal volume loss with sequela of chronic microvascular ischemic disease. There is also large vessel atherosclerosis.       3. Trace subtotal right mastoid effusion; correlation for relevant symptomatology is suggested.     4. Scattered paranasal sinus disease is seen.       5. Lesser incidental findings as above.           Dictated by (CST): Facundo Gibbs MD on 1/18/2025 at 5:42 PM      Finalized by (CST): Facundo Gibbs MD on 1/18/2025 at 5:45 PM              ASSESSMENT/PLAN:   (R51.9) Acute intractable headache, unspecified headache type  (primary encounter diagnosis)  Plan: NEURO - INTERNAL, Sed Bert De Los Santos         (Automated)        Referral and MRI ordered  (E87.6) Hypokalemia  Plan: Comp Metabolic Panel (14)        Pt nauseated and vomited in the ER  Will repeat lab            (F41.1) JULIÁN (generalized anxiety disorder)  Plan: controlled with med  occas    (E78.5) Hyperlipidemia, unspecified hyperlipidemia type  Plan: Low cholesterol diet advised  Avoid saturated and trans fats       Patient voiced understanding  and agrees with plan         Orders Placed This  Encounter   Procedures    Comp Metabolic Panel (14)    Sed Bert De Los Santos (Automated)       Meds This Visit:  Requested Prescriptions      No prescriptions requested or ordered in this encounter       Imaging & Referrals:  NEURO - INTERNAL  MRI BRAIN (CPT=70551)        ID#[id number]           [1] No Known Allergies

## 2025-01-22 NOTE — TELEPHONE ENCOUNTER
Denied    Note from payer: You asked for the drug above for your unspecified, acute intractable right-sided temporal headache. This is an off-label use that is not medically accepted. The Medicare rule in the Prescription Drug Benefit Manual (Chapter 6, Section 10.6) says a drug must be used for a medically accepted indication (covered use). Off-label use is medically accepted when there is proof in one or more of the drug guides that the drug works for your condition. We look at the two major drug guides (compendia): the DRUGDEX Information System and the [facility] Formulary Service Drug Information (AHFS-DI). [employer] has decided that there is no proof in either drug guide that this drug works for your condition. Per Medicare rules, it is not covered.  Payer: [employer] Case ID: [id number]    [phone]    [phone]  Electronic appeal: Not supported  View History

## 2025-01-23 DIAGNOSIS — F41.1 GAD (GENERALIZED ANXIETY DISORDER): ICD-10-CM

## 2025-01-23 RX ORDER — ALPRAZOLAM 0.25 MG/1
0.25 TABLET ORAL 2 TIMES DAILY
Qty: 60 TABLET | Refills: 0 | OUTPATIENT
Start: 2025-01-23

## 2025-01-23 RX ORDER — ALPRAZOLAM 0.25 MG
0.25 TABLET ORAL 2 TIMES DAILY
Qty: 60 TABLET | Refills: 0 | Status: SHIPPED | OUTPATIENT
Start: 2025-01-23 | End: 2025-02-25

## 2025-01-23 NOTE — TELEPHONE ENCOUNTER
Please review. Protocol Failed; No Protocol      Recent fills: 11/7/2024, 12/19/2024  Last Rx written: 12/19/2024  Last office visit: 11/19/2024    Recent Visits  Date Type Provider Dept   01/21/25 Office Visit Faraz Gutierrez MD [facility]   01/16/25 Office Visit Faraz Gutierrez MD [facility]       Requested Prescriptions   Pending Prescriptions Disp Refills    ALPRAZolam 0.25 MG Oral Tab 60 tablet 0     Sig: Take 1 tablet (0.25 mg total) by mouth 2 (two) times daily.       Controlled Substance Medication Failed - 1/23/2025 12:30 PM        Failed - This medication is a controlled substance - forward to provider to refill        Passed - Medication is active on med list               Future Appointments         Provider Department Appt Notes    In 3 weeks [facility] MRI RM1 (1.5T WIDE) [facility] [place]           Recent Outpatient Visits              2 days ago [facility], [facility]    [facility] Visit    1 week ago Other headache syndrome    [facility] Faraz Nicolas MD    Office Visit    1 month ago Arthritis of carpometacarpal (CMC) joint of thumb    [facility] [address], [place] Elias Maguire MD    Office Visit    1 month ago AK [facility]inic keratosis)    [facility]    Office Visit    2 months ago [facility]umb pain, right    [facility]    Office Visit

## 2025-01-23 NOTE — PROGRESS NOTES
Transitions of Care Navigation  Discharge Date: 25  Contact Date: 2025    Transitions of Care Assessment:  HERMILO Initial Assessment    General:  Assessment completed with: Patient  Patient Subjective: I'm coming along. I saw my PCP and I'm going to have an MRI done. They (HA's) are subsiding and not as severe. I'm progressing. Drinking a lot of fluids, no fever. I'm not sensitive to the light anymore.  Chief Complaint: Nonintractable headache, unspecified chronicity pattern, unspecified headache type  Verify patient name and  with patient/ caregiver: Yes    Hospital Stay/Discharge:  Tell me what you understand of why you were in the hospital or emergency department: Headaches  Prior to leaving the hospital were your Discharge Instructions reviewed with you?: Yes  Did you receive a copy of your written Discharge Instructions?: Yes  What questions do you have about your Discharge Instructions?: none  Do you feel better or worse since you left the hospital or emergency department?: Better    Follow - Up Appointment:  Do you have a follow-up appointment?: Yes (Pt completed appt already)  Date: 25  Physician: PCP  Are there any barriers to getting to your follow-up appointment?: No    Home Health/DME:  Prior to leaving the hospital was Home Health (HH) arranged for you?: No     Prior to leaving the hospital or emergency department was Durable Medical Equipment (DME), medical supplies, or infusions arranged for you?: No  Are DME/medical supply/infusions needs identified by staff during this assessment?: No     Medications/Diet:  Did any of your medications change, during or after your hospital stay or ED visit?: Yes  Do you have your new or updated medications?: Yes  Do you understand what your medications are for and possible side effects?: Yes  Are there any reasons that keep you from taking your medication as prescribed?: No  Any concerns about medication refills?: No    Were you given a different diet  per your Discharge Instructions?: No     Questions/Concerns:  Do you have any questions or concerns that have not been discussed?: No         Nursing Interventions: NCM reviewed discharge instructions and when to seek medical attention with the patient. She states that she completed her appt with PCP a couple days ago. No changes were made to her medications. She states that she is coming along. She states that her headaches are subsiding and not as severe. She states that the headache is located only on the right side. She states that she is no longer sensitive to the light. She denied having any fever, n/v/c/d, sob or any new or worsening symptoms. She is getting an MRI on 2/13/25. Med review completed. She is keeping a log of her bp's and today it was 136/83.  She states that she is not taking the Fioricet as it did not help her at all. She denied having any questions or concerns at this time.     Medications:  Medication Reconciliation:  I am aware of an inpatient discharge within the last 30 days.  The discharge medication list has been reconciled with the patient's current medication list and reviewed by me. See medication list for additions of new medication, and changes to current doses of medications and discontinued medications.  Current Outpatient Medications   Medication Sig Dispense Refill    Fluticasone Propionate (FLONASE NA) by Nasal route.      diphenhydrAMINE HCl (BENADRYL ALLERGY) 25 MG Oral Tab Take 1 tablet (25 mg total) by mouth every 6 (six) hours as needed for Itching.      Saccharomyces boulardii (FLORASTOR OR)       ibuprofen 600 MG Oral Tab Take 1 tablet (600 mg total) by mouth every 8 (eight) hours as needed for Pain or Fever. 20 tablet 0    acetaminophen 325 MG Oral Tab Take 2 tablets (650 mg total) by mouth every 6 (six) hours as needed for Pain. 20 tablet 0    amoxicillin clavulanate 875-125 MG Oral Tab Take 1 tablet by mouth 2 (two) times daily for 10 days. 20 tablet 0     Butalbital-APAP-Caffeine (FIORICET) -40 MG Oral Cap Take 1 capsule by mouth 3 (three) times daily as needed. 30 capsule 0    ALPRAZolam 0.25 MG Oral Tab Take 1 tablet (0.25 mg total) by mouth 2 (two) times daily. 60 tablet 0    amLODIPine 5 MG Oral Tab Take 1 tablet (5 mg total) by mouth daily. 90 tablet 3    atorvastatin 20 MG Oral Tab Take 1 tablet (20 mg total) by mouth every evening. 90 tablet 3    Fosinopril Sodium-HCTZ 20-12.5 MG Oral Tab Take 1 tablet by mouth daily. 90 tablet 3         Follow-up Appointments:  Your appointments       Date & Time Appointment Department ([place])    Feb 13, 2025 11:15 AM CST MRI BRAIN  with [facility] MRI RM1 (1.5T WIDE) [facility] [place] ([facility] [place])    You may be subject to a fee if you do not show up for your appointment or you cancel within 24 hours of your appointment.    Please arrive 30 minutes prior to your scheduled appointment time.  You will need time to change your clothes, fill out screening forms, use the restroom, and may need an IV if your exam requires contrast.  If you arrive too late, your appointment may need to be rescheduled.    Please do not wear any form of eye make-up to your MRI appointment. It can cause artifacts on the images and potentially obscure vital information.  You will be required to remove any eye make-up at your appointment.  You can eat, drink, and take your medication(s).     IF YOU REQUIRE ORAL SEDATION FOR YOUR MRI: Your physician is responsible for giving you a prescription for oral medication which you would fill at your local pharmacy. If you will be taking oral sedation, you must bring a  who will drive you home (the  does not necessarily have to stay throughout the procedure).              [facility] [place] [facility] [place]  [address] [place] [address] [phone]            [facility]  Was TCC Ordered: No      Primary Care Provider (If no TCC  appointment)  Does patient already have a PCP appointment scheduled? Yes, pt completed appt with PCP on 1/21/25      Specialist  Does the patient have any other follow-up appointment(s) need to be scheduled? No, pt is having an MRI on 2/13/25      []  Patient verbally agrees to additional follow-up calls from Nurse Care Manager    Book By Date: 1/25/25

## 2025-01-27 ENCOUNTER — PATIENT MESSAGE (OUTPATIENT)
Dept: INTERNAL MEDICINE CLINIC | Facility: CLINIC | Age: 70
End: 2025-01-27

## 2025-02-04 ENCOUNTER — LAB ENCOUNTER (OUTPATIENT)
Dept: LAB | Age: 70
End: 2025-02-04
Attending: INTERNAL MEDICINE
Payer: MEDICARE

## 2025-02-04 DIAGNOSIS — E87.6 HYPOKALEMIA: ICD-10-CM

## 2025-02-04 DIAGNOSIS — R51.9 ACUTE INTRACTABLE HEADACHE, UNSPECIFIED HEADACHE TYPE: ICD-10-CM

## 2025-02-04 LAB
ALBUMIN SERPL-MCNC: 5 G/DL (ref 3.2–4.8)
ALBUMIN/GLOB SERPL: 1.9 {RATIO} (ref 1–2)
ALP LIVER SERPL-CCNC: 52 U/L
ALT SERPL-CCNC: 18 U/L
ANION GAP SERPL CALC-SCNC: 9 MMOL/L (ref 0–18)
AST SERPL-CCNC: 15 U/L (ref ?–34)
BILIRUB SERPL-MCNC: 0.3 MG/DL (ref 0.2–1.1)
BUN BLD-MCNC: 12 MG/DL (ref 9–23)
BUN/CREAT SERPL: 15.2 (ref 10–20)
CALCIUM BLD-MCNC: 9.9 MG/DL (ref 8.7–10.4)
CHLORIDE SERPL-SCNC: 104 MMOL/L (ref 98–112)
CO2 SERPL-SCNC: 28 MMOL/L (ref 21–32)
CREAT BLD-MCNC: 0.79 MG/DL
EGFRCR SERPLBLD CKD-EPI 2021: 81 ML/MIN/1.73M2 (ref 60–?)
ERYTHROCYTE [SEDIMENTATION RATE] IN BLOOD: 8 MM/HR
FASTING STATUS PATIENT QL REPORTED: YES
GLOBULIN PLAS-MCNC: 2.6 G/DL (ref 2–3.5)
GLUCOSE BLD-MCNC: 112 MG/DL (ref 70–99)
OSMOLALITY SERPL CALC.SUM OF ELEC: 293 MOSM/KG (ref 275–295)
POTASSIUM SERPL-SCNC: 3.7 MMOL/L (ref 3.5–5.1)
PROT SERPL-MCNC: 7.6 G/DL (ref 5.7–8.2)
SODIUM SERPL-SCNC: 141 MMOL/L (ref 136–145)

## 2025-02-04 PROCEDURE — 80053 COMPREHEN METABOLIC PANEL: CPT

## 2025-02-04 PROCEDURE — 36415 COLL VENOUS BLD VENIPUNCTURE: CPT

## 2025-02-04 PROCEDURE — 85652 RBC SED RATE AUTOMATED: CPT

## 2025-02-04 RX ORDER — FOSINOPRIL SODIUM AND HYDROCHLOROTHIAZIDE 20; 12.5 MG/1; MG/1
1 TABLET ORAL DAILY
Qty: 90 TABLET | Refills: 3 | OUTPATIENT
Start: 2025-02-04

## 2025-02-04 NOTE — TELEPHONE ENCOUNTER
Fosinopril Sodium-HCTZ 20-12.5 MG Oral Tab 90 tablet 3 4/12/2024 --    Sig - Route: Take 1 tablet by mouth daily. - Oral    Sent to pharmacy as: Fosinopril Sodium-HCTZ 20-12.5 MG Oral Tablet (MONOPRIL-HCT)    E-Prescribing Status: Receipt confirmed by pharmacy (4/12/2024  2:41 PM CDT)      Pharmacy    [facility] - [facility] [address] [phone], [phone]

## 2025-02-10 ENCOUNTER — TELEPHONE (OUTPATIENT)
Dept: INTERNAL MEDICINE CLINIC | Facility: CLINIC | Age: 70
End: 2025-02-10

## 2025-02-10 NOTE — TELEPHONE ENCOUNTER
Keep appt with DR Altman 3/4/2025  If headaches are worsening  or persistent let me know  Last OV she was improving   given Augmentin for sinusitis

## 2025-02-10 NOTE — TELEPHONE ENCOUNTER
Patient contacts clinic reporting she could not complete MRI due to claustrophobia.  Does not think her alprazolam would be enough to keep her calm for the exam.  Nurse discussed MRI with anesthesia/sedation but this would require clearance and monitoring.  She inquires if any other testing would be appropriate.  Dr. Gutierrez please advise.

## 2025-02-11 ENCOUNTER — TELEPHONE (OUTPATIENT)
Dept: NEUROLOGY | Facility: CLINIC | Age: 70
End: 2025-02-11

## 2025-02-11 NOTE — TELEPHONE ENCOUNTER
Patient calling back ,verified name and date of birth.  Patient asks if our office can assist her in getting an earlier appointment with Dr Altman than 3/4/25?  Called [facility], office staff will add her to cancellation list and will send a message to clinical staff that patient is experiencing daily headaches and is requesting an earlier appointment. Neurology will contact patient.  Patient notified of above via detailed voicemail message, . call back our office if any questions. Office phone number provided with office telephone hours.

## 2025-02-13 NOTE — TELEPHONE ENCOUNTER
Patient's spouse states that patient can have an open MRI through [facility]. His insurance will cover it. They just need an order faxed. Okay to fax MRI Brain from 2/10/25 to [facility]?      Fax: [phone]

## 2025-02-13 NOTE — TELEPHONE ENCOUNTER
Patient advised of 's notes and verbalized understanding.    Patient asking if  can review the 2/4/25 blood tests.

## 2025-02-13 NOTE — TELEPHONE ENCOUNTER
CT brain 1/18/2025  pression   CONCLUSION:  1. No acute intracranial process by noncontrast CT technique.     2. Senescent changes of parenchymal volume loss with sequela of chronic microvascular ischemic disease. There is also large vessel atherosclerosis.       3. Trace subtotal right mastoid effusion; correlation for relevant symptomatology is suggested.     4. Scattered paranasal sinus disease is seen.       5. Lesser incidental findings as above.           Dictated by (CST): Facundo Gibbs MD on 1/18/2025 at 5:42 PM

## 2025-02-13 NOTE — TELEPHONE ENCOUNTER
Patient would like to know if you should order a CT Brain Head prior to seeing neurology. Does she needs another CT since she could not complete a MRI? Please advise.

## 2025-02-14 NOTE — TELEPHONE ENCOUNTER
Call from Sade at [facility].  Asking for MRI order to be faxed to them at fax [phone].  She said prior auth needs to be done through the provider's office.    Routed to [facility], can you authorize this MRI?

## 2025-02-17 ENCOUNTER — TELEPHONE (OUTPATIENT)
Dept: CASE MANAGEMENT | Age: 70
End: 2025-02-17

## 2025-02-17 NOTE — TELEPHONE ENCOUNTER
Advised patient of Dr Gutierrez's  note. Patient verbalized understanding and had no further questions.

## 2025-02-17 NOTE — TELEPHONE ENCOUNTER
Spoke to patient. Providers recommendations reviewed. Patient states she disagree's with recommendations. Advised that I would send message to provider for further direction.    Dr Gutierrez,    Patient states she does not believe holding off on MRI is in her best interest. Please advise.

## 2025-02-17 NOTE — TELEPHONE ENCOUNTER
Patient requesting MRI [id number] order be faxed to:    [facility]  [address]  [address]  [place]   Fax [phone]    No appointment scheduled

## 2025-02-18 NOTE — TELEPHONE ENCOUNTER
Lets keep the current 3/4 appt, can put her on wait list for a cancellation in the meantime. Thanks!

## 2025-02-25 DIAGNOSIS — F41.1 GAD (GENERALIZED ANXIETY DISORDER): ICD-10-CM

## 2025-02-28 RX ORDER — ALPRAZOLAM 0.25 MG
0.25 TABLET ORAL 2 TIMES DAILY
Qty: 60 TABLET | Refills: 0 | Status: SHIPPED | OUTPATIENT
Start: 2025-02-28 | End: 2025-04-28

## 2025-02-28 NOTE — TELEPHONE ENCOUNTER
Please kindly review this medication    [x] FAILS PROTOCOL    [] HAS NO PROTOCOL ATTACHED    Recent fill dates: 1/24/25, 12/19/24, and 11/7/24  Date of  last written prescription: 1/23/25   Last written quantity: #60 each and processed as a 30 day supply  Refill date: Now (2/24/25)  LAST OFFICE VISIT: 1/21/25  [] Takes as needed  [x] Takes scheduled twice daily

## 2025-03-04 ENCOUNTER — TELEPHONE (OUTPATIENT)
Dept: NEUROLOGY | Facility: CLINIC | Age: 70
End: 2025-03-04

## 2025-03-04 ENCOUNTER — OFFICE VISIT (OUTPATIENT)
Dept: NEUROLOGY | Facility: CLINIC | Age: 70
End: 2025-03-04
Payer: MEDICARE

## 2025-03-04 DIAGNOSIS — G43.709 CHRONIC MIGRAINE W/O AURA W/O STATUS MIGRAINOSUS, NOT INTRACTABLE: Primary | ICD-10-CM

## 2025-03-04 PROCEDURE — 99204 OFFICE O/P NEW MOD 45 MIN: CPT | Performed by: INTERNAL MEDICINE

## 2025-03-04 PROCEDURE — 1159F MED LIST DOCD IN RCRD: CPT | Performed by: INTERNAL MEDICINE

## 2025-03-04 PROCEDURE — 1160F RVW MEDS BY RX/DR IN RCRD: CPT | Performed by: INTERNAL MEDICINE

## 2025-03-04 RX ORDER — RIMEGEPANT SULFATE 75 MG/75MG
75 TABLET, ORALLY DISINTEGRATING ORAL AS NEEDED
Qty: 8 TABLET | Refills: 11 | Status: SHIPPED | OUTPATIENT
Start: 2025-03-04 | End: 2026-03-04

## 2025-03-04 RX ORDER — AMITRIPTYLINE HYDROCHLORIDE 10 MG/1
10 TABLET ORAL NIGHTLY
Qty: 90 TABLET | Refills: 3 | Status: SHIPPED | OUTPATIENT
Start: 2025-03-04

## 2025-03-04 NOTE — PROGRESS NOTES
[address], [address]  [facility] [address]  [phone]            Neurology Initial Visit     Referred By: Dr. Gutierrez    Chief Complaint:   Chief Complaint   Patient presents with    Neurologic Problem     Patient presents here today to establish care, patient was referred by Faraz Gutierrez MD to evaluate and treat Acute intractable headache, unspecified headache type. Patient c/o having migraines 2-5 per month with light sensitivity with nausea since January 2025. Denies dizziness. Current medication acetaminophen 325 MG. Will upload MRI of the Brain from [facility].        HPI:     Ashli Lange is a 69 year old female with history of hypertension, osteoarthritis, who presents for evaluation of headache.  Started in January when she had a severe upper respiratory virus.  Started having severe headaches, but after resolution of the viral illness headaches have persisted.  Describes a severe pain over the right side of the head and slightly involving the neck.  When pain intensifies, she will have light sensitivity, sound sensitivity, and has had pain behind the right eye with intense headaches as well..  Describes the pain as a severe pounding that will come out of nowhere.  Has no prior migraine history, no family history of migraines in her immediate family.  Over time since onset in January, headaches have improved a little bit, which she attributes more to her knowing how to manage them.  She will lay down in a dark room after taking 3 ibuprofen and rest for a few hours and usually headache will resolve with this.  Headache will stay away until the next day or 48 hours later when it will recur.  She has never been a great sleeper, will have insomnia periodically and then end up sleeping during the day to catch up.    Past Medical History:    Actinic keratosis    right nasal dorsum    Anxiety    Chicken pox    Folliculitis    right nasal dorsum-  dermal infiltrate with histiocytes consistent with ruptured folliculitis    Heart murmur    Measles    Osteoarthritis    Other and unspecified hyperlipidemia    Primary osteoarthritis of left shoulder    Unspecified essential hypertension    Visual impairment    glasses       Past Surgical History:   Procedure Laterality Date    Colonoscopy  2006    Colonoscopy      Egd      Electrocardiogram, complete  07/05/2005    Scanned to media tab     Hysterectomy      Korey localization wire 1 site right (cpt=19281) Right 2010    Other  2020    left shoulder replacement     Other surgical history      breast biopsy     Thyroidectomy      Upper gi endoscopy performed  5/2015       Social history:  History   Smoking Status    Former    Types: Cigarettes   Smokeless Tobacco    Never     History   Alcohol Use No     Comment: occ.     History   Drug Use No       Family History   Problem Relation Age of Onset    Diabetes Sister     Hypertension Father     Heart Disorder Mother     Cancer Mother     Lung Disorder Mother     Other (Other) Maternal Grandmother     Other (Other) Maternal Grandfather     Other (Other) Paternal Grandmother     Other (Other) Paternal Grandfather     Hypertension Sister 72    Breast Cancer Neg     Ovarian Cancer Neg          Current Outpatient Medications:     Rimegepant Sulfate (NURTEC) 75 MG Oral Tablet Dispersible, Take 75 mg by mouth as needed. Take one tablet at onset of migraine.  Maximum dose in 24 hours is 1 tablet (75mg)., Disp: 8 tablet, Rfl: 11    amitriptyline 10 MG Oral Tab, Take 1 tablet (10 mg total) by mouth nightly., Disp: 90 tablet, Rfl: 3    ALPRAZolam 0.25 MG Oral Tab, Take 1 tablet (0.25 mg total) by mouth 2 (two) times daily., Disp: 60 tablet, Rfl: 0    Fluticasone Propionate (FLONASE NA), by Nasal route., Disp: , Rfl:     diphenhydrAMINE HCl (BENADRYL ALLERGY) 25 MG Oral Tab, Take 1 tablet (25 mg total) by mouth every 6 (six) hours as needed for Itching., Disp: , Rfl:      acetaminophen 325 MG Oral Tab, Take 2 tablets (650 mg total) by mouth every 6 (six) hours as needed for Pain., Disp: 20 tablet, Rfl: 0    amLODIPine 5 MG Oral Tab, Take 1 tablet (5 mg total) by mouth daily., Disp: 90 tablet, Rfl: 3    atorvastatin 20 MG Oral Tab, Take 1 tablet (20 mg total) by mouth every evening., Disp: 90 tablet, Rfl: 3    Fosinopril Sodium-HCTZ 20-12.5 MG Oral Tab, Take 1 tablet by mouth daily., Disp: 90 tablet, Rfl: 3    Allergies[1]    ROS:   As in HPI, the rest of the 14 system review was done and was negative      Physical Exam:  There were no vitals filed for this visit.    General: No apparent distress, well nourished, well groomed.  Head- Normocephalic, atraumatic  Eyes- No redness or swelling    Neurological:   Mental Status:  Mental Status- Alert, conversant, speech fluent, following all commands and Fund of knowledge appropriate for education and age    Cranial Nerves:  II.- Visual fields full to confrontation,       Fundoscopic Exam- Sharp optic discs, no pallor, III, IV, VI- EOM intact, V. Facial sensation intact, and VII. Face symmetric, no facial weakness    Motor Exam:  Strength- upper extremities 5/5 proximally and distally                - lower  extremities 5/5 proximally and distally    Sensory Exam:  Light touch- intact in all 4 extremities    Deep Tendon Reflexes:  Biceps 2+ bilateral symmetric  Triceps 2+ bilateral symmetric  Brachioradialis 2 + bilateral symmetric  Patellar 2+ bilateral symmetric  Ankle jerks 2+ bilateral symmetric    Coordination:  No dysmetria with finger to nose bilaterally    Gait:  Normal casual gait    Labs:    Lab Results   Component Value Date    TSH 1.721 12/02/2024     Lab Results   Component Value Date    HDL 72 (H) 12/02/2024    LDL 96 12/02/2024    TRIG 93 12/02/2024     Lab Results   Component Value Date    HGB 11.7 (L) 01/18/2025    HCT 33.9 (L) 01/18/2025    MCV 87.4 01/18/2025    WBC 9.6 01/18/2025    .0 01/18/2025      Lab Results    Component Value Date    BUN 12 02/04/2025    CA 9.9 02/04/2025    ALT 18 02/04/2025    AST 15 02/04/2025    ALKPHOS 36 02/27/2016    ALB 5.0 (H) 02/04/2025     02/04/2025    K 3.7 02/04/2025     02/04/2025    CO2 28.0 02/04/2025      I have reviewed labs.    Imaging Studies:  I have independently reviewed imaging.        Assessment   Ashli Lange is a 69 year old female with history of hypertension, osteoarthritis, who presents for evaluation of headache.    1. Chronic migraine w/o aura w/o status migrainosus, not intractable  - Rimegepant Sulfate (NURTEC) 75 MG Oral Tablet Dispersible; Take 75 mg by mouth as needed. Take one tablet at onset of migraine.  Maximum dose in 24 hours is 1 tablet (75mg).  Dispense: 8 tablet; Refill: 11  - amitriptyline 10 MG Oral Tab; Take 1 tablet (10 mg total) by mouth nightly.  Dispense: 90 tablet; Refill: 3.  Cautioned regarding side effects of amitriptyline especially given her age.  Stop if urinary retention, dry mouth, eye pain or confusion occurs  -Headaches are likely migrainous given boring eye pain with severe headaches, lateralized pain, and associated light sensitivity  -May have some component of superimposed muscle tension but more likely migrainous   -Reviewed MRI brain images which shows no abnormality from my review       Education and counseling provided to patient. Instructed patient to call my office or seek medical attention immediately if symptoms worsen.  Patient verbalized understanding of information given. All questions were answered. All side effects of drugs were discussed.       Return to clinic in: Return in about 2 months (around 5/4/2025).    Brie Altman MD         [1] No Known Allergies

## 2025-03-05 ENCOUNTER — PATIENT MESSAGE (OUTPATIENT)
Dept: NEUROLOGY | Facility: CLINIC | Age: 70
End: 2025-03-05

## 2025-03-14 NOTE — TELEPHONE ENCOUNTER
This RN called to [facility] to [facility] was completed through phone call. Turn around time is 72 hours for a decision to be made and will be faxed to this office. Case ID #[id number]

## 2025-03-20 ENCOUNTER — PATIENT MESSAGE (OUTPATIENT)
Dept: NEUROLOGY | Facility: CLINIC | Age: 70
End: 2025-03-20

## 2025-03-25 DIAGNOSIS — G43.709 CHRONIC MIGRAINE W/O AURA W/O STATUS MIGRAINOSUS, NOT INTRACTABLE: Primary | ICD-10-CM

## 2025-03-25 RX ORDER — UBROGEPANT 50 MG/1
TABLET ORAL
Qty: 10 TABLET | Refills: 11 | Status: SHIPPED | OUTPATIENT
Start: 2025-03-25 | End: 2026-03-25

## 2025-04-28 DIAGNOSIS — F41.1 GAD (GENERALIZED ANXIETY DISORDER): ICD-10-CM

## 2025-04-30 RX ORDER — ALPRAZOLAM 0.25 MG
0.25 TABLET ORAL 2 TIMES DAILY
Qty: 60 TABLET | Refills: 0 | Status: SHIPPED | OUTPATIENT
Start: 2025-04-30 | End: 2025-06-10

## 2025-05-06 ENCOUNTER — OFFICE VISIT (OUTPATIENT)
Dept: NEUROLOGY | Facility: CLINIC | Age: 70
End: 2025-05-06
Payer: MEDICARE

## 2025-05-06 DIAGNOSIS — G43.709 CHRONIC MIGRAINE W/O AURA W/O STATUS MIGRAINOSUS, NOT INTRACTABLE: Primary | ICD-10-CM

## 2025-05-06 PROCEDURE — 99213 OFFICE O/P EST LOW 20 MIN: CPT | Performed by: INTERNAL MEDICINE

## 2025-05-06 PROCEDURE — G2211 COMPLEX E/M VISIT ADD ON: HCPCS | Performed by: INTERNAL MEDICINE

## 2025-05-06 PROCEDURE — 1160F RVW MEDS BY RX/DR IN RCRD: CPT | Performed by: INTERNAL MEDICINE

## 2025-05-06 PROCEDURE — 1159F MED LIST DOCD IN RCRD: CPT | Performed by: INTERNAL MEDICINE

## 2025-05-06 RX ORDER — SODIUM FLUORIDE 5 MG/G
GEL, DENTIFRICE DENTAL
COMMUNITY
Start: 2025-03-24

## 2025-05-06 RX ORDER — AMOXICILLIN 500 MG/1
CAPSULE ORAL
COMMUNITY
Start: 2025-04-22

## 2025-05-06 RX ORDER — ACETAMINOPHEN AND CODEINE PHOSPHATE 300; 30 MG/1; MG/1
1 TABLET ORAL
COMMUNITY
Start: 2025-04-22

## 2025-05-06 NOTE — PROGRESS NOTES
[facility] NEUROSCIENCES [address], [address]  [facility] [address]  [phone]            Neurology Follow Up Visit     Referred By: Dr. Turner ref. provider found    Chief Complaint:   Chief Complaint   Patient presents with    Migraine     LOV 3/4/2025 Seen for Chronic migraine w/o aura w/o status migrainosus.    Patient presents here today for 2 month follow-up, patient reports having migraines, 1 per month with light sensitivity. Denies dizziness, nausea. Current medications · amitriptyline, Ubrelvy and not taking Nurtec.   Patient gives verbal consent to use [employer]        History of Present Illness  Ashli Lange is a 69 year old female with migraines who presents for follow-up of chronic migraine.    She has experienced significant improvement in her migraine symptoms since starting amitriptyline. Migraines that previously occurred upon waking or during the night have ceased. She feels 'a hundred percent better' and has not experienced any headaches recently, except for one instance where she effectively used Ubrelvy.    She has been prescribed Ubrelvy and has used it only a couple of times. She was given eight tablets and still has some remaining. Ubrelvy worked effectively for her migraine, and she did not need to take a second dose.    She is currently taking amitriptyline at night, which she believes may be preventing her migraines. She reports no significant side effects from amitriptyline, except for some initial dry mouth, which has improved over time. She manages this by keeping water at night.    She recounts a previous experience with an MRI due to severe headaches. She initially attempted a closed MRI but was unable to complete it due to claustrophobia. Her son found an open MRI facility where she was able to complete the scan with her son present for support. The headaches at that time were described as 'so severe'.    No side effects from amitriptyline except for  some dry mouth, which has improved. No issues with needing to use the bathroom urgently.        Past Medical History:    Actinic keratosis    right nasal dorsum    Anxiety    Chicken pox    Folliculitis    right nasal dorsum- dermal infiltrate with histiocytes consistent with ruptured folliculitis    Heart murmur    Measles    Osteoarthritis    Other and unspecified hyperlipidemia    Primary osteoarthritis of left shoulder    Unspecified essential hypertension    Visual impairment    glasses       Past Surgical History:   Procedure Laterality Date    Colonoscopy  2006    Colonoscopy      Egd      Electrocardiogram, complete  07/05/2005    Scanned to media tab     Hysterectomy      Korey localization wire 1 site right (cpt=19281) Right 2010    Other  2020    left shoulder replacement     Other surgical history      breast biopsy     Thyroidectomy      Upper gi endoscopy performed  05/2015       Social history:  History   Smoking Status    Former    Types: Cigarettes   Smokeless Tobacco    Never     History   Alcohol Use No     Comment: occ.     History   Drug Use No       Family History   Problem Relation Age of Onset    Diabetes Sister     Hypertension Father     Heart Disorder Mother     Cancer Mother     Lung Disorder Mother     Other (Other) Maternal Grandmother     Other (Other) Maternal Grandfather     Other (Other) Paternal Grandmother     Other (Other) Paternal Grandfather     Hypertension Sister 72    Breast Cancer Neg     Ovarian Cancer Neg          Current Outpatient Medications:     acetaminophen-codeine 300-30 MG Oral Tab, Take 1 tablet by mouth every 4 to 6 hours as needed for Pain., Disp: , Rfl:     Sodium Fluoride 1.1 % Dental Gel, , Disp: , Rfl:     ubrogepant (UBRELVY) 50 MG Oral Tab, Take one tablet at onset of migraine.  May take additional tablet in 2 hours if needed.  Do not exceed two tablets per 24 hour period., Disp: 10 tablet, Rfl: 11    amitriptyline 10 MG Oral Tab, Take 1 tablet (10 mg  total) by mouth nightly., Disp: 90 tablet, Rfl: 3    amoxicillin 500 MG Oral Cap, TAKE ONE CAPSULE BY MOUTH THREE TIMES DAILY UNTIL FINISHED (Patient not taking: Reported on 5/6/2025), Disp: , Rfl:     ALPRAZolam 0.25 MG Oral Tab, Take 1 tablet (0.25 mg total) by mouth 2 (two) times daily., Disp: 60 tablet, Rfl: 0    Fluticasone Propionate (FLONASE NA), by Nasal route., Disp: , Rfl:     diphenhydrAMINE HCl (BENADRYL ALLERGY) 25 MG Oral Tab, Take 1 tablet (25 mg total) by mouth every 6 (six) hours as needed for Itching., Disp: , Rfl:     acetaminophen 325 MG Oral Tab, Take 2 tablets (650 mg total) by mouth every 6 (six) hours as needed for Pain. (Patient not taking: Reported on 5/6/2025), Disp: 20 tablet, Rfl: 0    amLODIPine 5 MG Oral Tab, Take 1 tablet (5 mg total) by mouth daily., Disp: 90 tablet, Rfl: 3    atorvastatin 20 MG Oral Tab, Take 1 tablet (20 mg total) by mouth every evening., Disp: 90 tablet, Rfl: 3    Fosinopril Sodium-HCTZ 20-12.5 MG Oral Tab, Take 1 tablet by mouth daily., Disp: 90 tablet, Rfl: 3    Allergies[1]    ROS:   As in HPI, the rest of the 14 system review was done and was negative      Physical Exam:  There were no vitals filed for this visit.    General: No apparent distress, well nourished, well groomed.  Head- Normocephalic, atraumatic  Eyes- No redness or swelling    Neurological:   Mental Status:  Mental Status- Alert, conversant, speech fluent, following all commands and Fund of knowledge appropriate for education and age        Labs:    Lab Results   Component Value Date    TSH 1.721 12/02/2024     Lab Results   Component Value Date    HDL 72 (H) 12/02/2024    LDL 96 12/02/2024    TRIG 93 12/02/2024     Lab Results   Component Value Date    HGB 11.7 (L) 01/18/2025    HCT 33.9 (L) 01/18/2025    MCV 87.4 01/18/2025    WBC 9.6 01/18/2025    .0 01/18/2025      Lab Results   Component Value Date    BUN 12 02/04/2025    CA 9.9 02/04/2025    ALT 18 02/04/2025    AST 15 02/04/2025     ALKPHOS 36 02/27/2016    ALB 5.0 (H) 02/04/2025     02/04/2025    K 3.7 02/04/2025     02/04/2025    CO2 28.0 02/04/2025      I have reviewed labs.    Imaging Studies:  I have independently reviewed imaging.    Assessment & Plan  Chronic migraine  Current treatment with amitriptyline and Ubrelvy effectively reduces migraine frequency and severity. Amitriptyline causes mild dry mouth. Consideration for tapering amitriptyline if migraines remain minimal over the next year or two.  - Continue amitriptyline 10mg nightly for prevention.  - Continue Ubrelvy as needed for acute attacks.  - Follow-up in six months to reassess.    Education and counseling provided to patient. Instructed patient to call my office or seek medical attention immediately if symptoms worsen.  Patient verbalized understanding of information given. All questions were answered. All side effects of drugs were discussed.       Return to clinic in: Return in about 6 months (around 11/6/2025).    Brie Altman MD         [1] No Known Allergies

## 2025-05-09 ENCOUNTER — TELEPHONE (OUTPATIENT)
Dept: INTERNAL MEDICINE CLINIC | Facility: CLINIC | Age: 70
End: 2025-05-09

## 2025-06-10 DIAGNOSIS — F41.1 GAD (GENERALIZED ANXIETY DISORDER): ICD-10-CM

## 2025-06-11 RX ORDER — ALPRAZOLAM 0.25 MG
0.25 TABLET ORAL 2 TIMES DAILY
Qty: 60 TABLET | Refills: 0 | Status: SHIPPED | OUTPATIENT
Start: 2025-06-11

## 2025-06-11 NOTE — TELEPHONE ENCOUNTER
Please review: medication fails/has no protocol attached.    No future appointments with primary care medicine   Last office visit: 1/21/25 (Dr. Gutierrez)    Recent fill dates: 5/1/25, 2/28/25, and 1/24/25  Date of  last written prescription: 4/30/25   Last dispensed quantity: #60 each and processed as a 30 day supply

## 2025-07-28 RX ORDER — AMLODIPINE BESYLATE 5 MG/1
5 TABLET ORAL DAILY
Qty: 90 TABLET | Refills: 3 | Status: SHIPPED | OUTPATIENT
Start: 2025-07-28

## 2025-07-28 NOTE — TELEPHONE ENCOUNTER
For replies, please route to pool: [facility] CENTRAL REFILLS    Please review: medication fails/has no protocol attached.    Future Appointments   Date Time Provider Department Center   8/5/2025  9:00 AM Anita Eid APRN [facility]

## 2025-08-01 DIAGNOSIS — E78.5 HYPERLIPIDEMIA, UNSPECIFIED HYPERLIPIDEMIA TYPE: ICD-10-CM

## 2025-08-02 DIAGNOSIS — F41.1 GAD (GENERALIZED ANXIETY DISORDER): ICD-10-CM

## 2025-08-04 RX ORDER — ATORVASTATIN CALCIUM 20 MG/1
20 TABLET, FILM COATED ORAL EVERY EVENING
Qty: 90 TABLET | Refills: 3 | Status: SHIPPED | OUTPATIENT
Start: 2025-08-04

## 2025-08-05 ENCOUNTER — OFFICE VISIT (OUTPATIENT)
Dept: INTERNAL MEDICINE CLINIC | Facility: CLINIC | Age: 70
End: 2025-08-05

## 2025-08-05 VITALS
DIASTOLIC BLOOD PRESSURE: 68 MMHG | TEMPERATURE: 98 F | WEIGHT: 134.63 LBS | SYSTOLIC BLOOD PRESSURE: 134 MMHG | HEIGHT: 60 IN | HEART RATE: 73 BPM | BODY MASS INDEX: 26.43 KG/M2 | OXYGEN SATURATION: 99 %

## 2025-08-05 DIAGNOSIS — R73.03 PREDIABETES: ICD-10-CM

## 2025-08-05 DIAGNOSIS — E78.5 HYPERLIPIDEMIA, UNSPECIFIED HYPERLIPIDEMIA TYPE: Chronic | ICD-10-CM

## 2025-08-05 DIAGNOSIS — I10 ESSENTIAL HYPERTENSION: ICD-10-CM

## 2025-08-05 DIAGNOSIS — M85.852 OSTEOPENIA OF NECK OF LEFT FEMUR: ICD-10-CM

## 2025-08-05 DIAGNOSIS — Z00.00 MEDICARE ANNUAL WELLNESS VISIT, SUBSEQUENT: Primary | ICD-10-CM

## 2025-08-05 DIAGNOSIS — Z12.31 VISIT FOR SCREENING MAMMOGRAM: ICD-10-CM

## 2025-08-05 DIAGNOSIS — Z12.11 COLON CANCER SCREENING: ICD-10-CM

## 2025-08-05 DIAGNOSIS — H61.22 IMPACTED CERUMEN OF LEFT EAR: ICD-10-CM

## 2025-08-05 DIAGNOSIS — M79.641 RIGHT HAND PAIN: ICD-10-CM

## 2025-08-05 DIAGNOSIS — G43.709 CHRONIC MIGRAINE W/O AURA W/O STATUS MIGRAINOSUS, NOT INTRACTABLE: ICD-10-CM

## 2025-08-05 DIAGNOSIS — F41.1 GENERALIZED ANXIETY DISORDER: ICD-10-CM

## 2025-08-05 RX ORDER — ALPRAZOLAM 0.25 MG
0.25 TABLET ORAL 2 TIMES DAILY
Qty: 60 TABLET | Refills: 0 | Status: SHIPPED | OUTPATIENT
Start: 2025-08-05

## 2025-08-05 RX ORDER — FOSINOPRIL SODIUM AND HYDROCHLOROTHIAZIDE 20; 12.5 MG/1; MG/1
1 TABLET ORAL DAILY
Qty: 90 TABLET | Refills: 3 | Status: SHIPPED | OUTPATIENT
Start: 2025-08-05

## (undated) DEVICE — SUTURE VICRYL 2-0 FS-1

## (undated) DEVICE — KIT DRN 1/8IN PVC 3 SPRG EVAC

## (undated) DEVICE — PAD THRP WRPON UNV PLRCR SHLDR

## (undated) DEVICE — T-MAX DISPOSABLE FACE MASK 8 PER BOX

## (undated) DEVICE — ENCORE® LATEX MICRO SIZE 8, STERILE LATEX POWDER-FREE SURGICAL GLOVE: Brand: ENCORE

## (undated) DEVICE — SOL  .9 1000ML BTL

## (undated) DEVICE — 3M™ STERI-STRIP™ COMPOUND BENZOIN TINCTURE 40 BAGS/CARTON 4 CARTONS/CASE C1544: Brand: 3M™ STERI-STRIP™

## (undated) DEVICE — DRESSING 10X4IN ANMC SAFETAC

## (undated) DEVICE — IMMOBILIZER ORTH MED UNV

## (undated) DEVICE — BATTERY

## (undated) DEVICE — HOOD: Brand: FLYTE

## (undated) DEVICE — Device: Brand: STABLECUT®

## (undated) DEVICE — PROXIMATE SKIN STAPLERS (35 WIDE) CONTAINS 35 STAINLESS STEEL STAPLES (FIXED HEAD): Brand: PROXIMATE

## (undated) DEVICE — SUTURE VICRYL 0 CP-1

## (undated) DEVICE — SUTURE ETHIBOND 0 CX31D

## (undated) DEVICE — APPLICATOR COTTON TIP 3 10/PK

## (undated) DEVICE — DRAPE SRG 90X60IN BCK TBL CVR

## (undated) DEVICE — SHOULDER: Brand: MEDLINE INDUSTRIES, INC.

## (undated) DEVICE — PLASTC TOOMEY SYRNG DISP

## (undated) DEVICE — GOWN SURG AERO BLUE PERF LG

## (undated) DEVICE — 3M™ STERI-STRIP™ REINFORCED ADHESIVE SKIN CLOSURES, R1547, 1/2 IN X 4 IN (12 MM X 100 MM), 6 STRIPS/ENVELOPE: Brand: 3M™ STERI-STRIP™

## (undated) DEVICE — GAMMEX® PI HYBRID SIZE 8, STERILE POWDER-FREE SURGICAL GLOVE, POLYISOPRENE AND NEOPRENE BLEND: Brand: GAMMEX

## (undated) DEVICE — POLAR CARE CUBE COOLING UNIT

## (undated) DEVICE — CHLORAPREP 26ML APPLICATOR

## (undated) NOTE — Clinical Note
Mary,  I met with Yang Webster in clinic today for weight loss/management. I have recommended intensive lifestyle/behavioral modifications for weight loss. In addition, I have recommended weight loss medication support.  She will meet with our dietician and DEEDEE

## (undated) NOTE — LETTER
12/07/20        Kevin Daily  [facility]      Dear Tiff Sequeira records indicate that you have outstanding lab work and or testing that was ordered for you and has not yet been completed:  Orders Placed This Encounter

## (undated) NOTE — LETTER
[facility]  [address] [place] [id number] [phone]     [id number]      Hello,     This is the [facility], office of Dr. Cindi Molina    Thank you for putting your trust in [place] [address]. Our goal is to deliver the highest quality healthcare and an exceptional patient experience. Upon reviewing of your medical record shows you are due for the following:         Annual Medicare Physical           Please call  to schedule your appointment or schedule online via [employer]. If you changed to a new provider at another facility, please notify the clinic to update your records. If you had any recent testing at another facility, please have your results faxed to our office at [phone]. Thank you and have a great day!

## (undated) NOTE — MR AVS SNAPSHOT
[address], [place]  [address]  [address]  [phone]               Thank you for choosing us for your health care visit with Joel Rodriguez MD.  We are glad to serve you and happy to provide you with this summary of Gastro Referral - In Network    Complete by:  As directed    Assoc Dx:  Colon cancer screening [Z12.11], Gastritis, erosive [K29.60]           Mammo Diagnostic TOMOsynthesis BILATERAL    Complete by:  Jun 07, 2017 (Approximate)    Assoc Dx:  Dense breast between 7:30am to 6pm and on Saturday between 8am and 1pm. Evening and weekend appointments for your exam are available. Walk-in patients are welcome for most exams.      [facility]/[place] and [address] [place] [address] Carolyne your appointment immediately. However, if you are unsure about the requirements for authorization, please wait 5-7 days and then contact your physician's office.  At that time, you will be provided with any authorization numbers or be assured that none are TAKE 1 TABLET BY MOUTH AT BEDTIME   Commonly known as:  AMBIEN                Where to Get Your Medications      These medications were sent to [facility]/PHARMACY #[id number]- [address], [address] [facility]. [phone], [address] [place] [address] Tips for increasing your physical activity – Adults who are physically active are less likely to develop some chronic diseases than adults who are inactive.      HOW TO GET STARTED: HOW TO STAY MOTIVATED:   Start activities slowly and build up over time Do

## (undated) NOTE — LETTER
04/02/18        Jose Broussard  [phone]      Dear Liza Moreira records indicate that you have outstanding lab work and or testing that was ordered for you and has not yet been completed:          Vit D total

## (undated) NOTE — LETTER
AUTHORIZATION FOR SURGICAL OPERATION OR OTHER PROCEDURE    1.  I hereby [facility]. Anthony Pal, [facility] to my case to perform the following operation and/or procedure at [facility]:    _______Cortisone injection left Patient Name:  ______________________________________________________  (please print)      Patient signature:  ___________________________________________________             Relationship to Patient:           []  Parent    Responsible person